# Patient Record
Sex: FEMALE | Race: WHITE | NOT HISPANIC OR LATINO | URBAN - METROPOLITAN AREA
[De-identification: names, ages, dates, MRNs, and addresses within clinical notes are randomized per-mention and may not be internally consistent; named-entity substitution may affect disease eponyms.]

---

## 2017-06-15 ENCOUNTER — OUTPATIENT (OUTPATIENT)
Dept: OUTPATIENT SERVICES | Facility: HOSPITAL | Age: 67
LOS: 1 days | Discharge: HOME | End: 2017-06-15

## 2017-06-28 DIAGNOSIS — R79.1 ABNORMAL COAGULATION PROFILE: ICD-10-CM

## 2017-06-29 ENCOUNTER — OUTPATIENT (OUTPATIENT)
Dept: OUTPATIENT SERVICES | Facility: HOSPITAL | Age: 67
LOS: 1 days | Discharge: HOME | End: 2017-06-29

## 2017-06-29 DIAGNOSIS — R79.1 ABNORMAL COAGULATION PROFILE: ICD-10-CM

## 2017-07-27 ENCOUNTER — OUTPATIENT (OUTPATIENT)
Dept: OUTPATIENT SERVICES | Facility: HOSPITAL | Age: 67
LOS: 1 days | Discharge: HOME | End: 2017-07-27

## 2017-07-27 DIAGNOSIS — R79.1 ABNORMAL COAGULATION PROFILE: ICD-10-CM

## 2017-07-27 DIAGNOSIS — N39.0 URINARY TRACT INFECTION, SITE NOT SPECIFIED: ICD-10-CM

## 2017-08-24 ENCOUNTER — OUTPATIENT (OUTPATIENT)
Dept: OUTPATIENT SERVICES | Facility: HOSPITAL | Age: 67
LOS: 1 days | Discharge: HOME | End: 2017-08-24

## 2017-08-24 DIAGNOSIS — R79.1 ABNORMAL COAGULATION PROFILE: ICD-10-CM

## 2017-09-07 ENCOUNTER — OUTPATIENT (OUTPATIENT)
Dept: OUTPATIENT SERVICES | Facility: HOSPITAL | Age: 67
LOS: 1 days | Discharge: HOME | End: 2017-09-07

## 2017-09-07 DIAGNOSIS — R79.1 ABNORMAL COAGULATION PROFILE: ICD-10-CM

## 2017-09-21 ENCOUNTER — OUTPATIENT (OUTPATIENT)
Dept: OUTPATIENT SERVICES | Facility: HOSPITAL | Age: 67
LOS: 1 days | Discharge: HOME | End: 2017-09-21

## 2017-09-21 DIAGNOSIS — R79.1 ABNORMAL COAGULATION PROFILE: ICD-10-CM

## 2017-10-12 ENCOUNTER — OUTPATIENT (OUTPATIENT)
Dept: OUTPATIENT SERVICES | Facility: HOSPITAL | Age: 67
LOS: 1 days | Discharge: HOME | End: 2017-10-12

## 2017-10-12 DIAGNOSIS — R79.1 ABNORMAL COAGULATION PROFILE: ICD-10-CM

## 2017-10-26 ENCOUNTER — OUTPATIENT (OUTPATIENT)
Dept: OUTPATIENT SERVICES | Facility: HOSPITAL | Age: 67
LOS: 1 days | Discharge: HOME | End: 2017-10-26

## 2017-10-26 DIAGNOSIS — R79.1 ABNORMAL COAGULATION PROFILE: ICD-10-CM

## 2017-11-09 ENCOUNTER — OUTPATIENT (OUTPATIENT)
Dept: OUTPATIENT SERVICES | Facility: HOSPITAL | Age: 67
LOS: 1 days | Discharge: HOME | End: 2017-11-09

## 2017-11-09 DIAGNOSIS — R79.1 ABNORMAL COAGULATION PROFILE: ICD-10-CM

## 2017-11-22 ENCOUNTER — OUTPATIENT (OUTPATIENT)
Dept: OUTPATIENT SERVICES | Facility: HOSPITAL | Age: 67
LOS: 1 days | Discharge: HOME | End: 2017-11-22

## 2017-11-22 DIAGNOSIS — R79.1 ABNORMAL COAGULATION PROFILE: ICD-10-CM

## 2017-12-07 ENCOUNTER — OUTPATIENT (OUTPATIENT)
Dept: OUTPATIENT SERVICES | Facility: HOSPITAL | Age: 67
LOS: 1 days | Discharge: HOME | End: 2017-12-07

## 2017-12-07 DIAGNOSIS — R79.1 ABNORMAL COAGULATION PROFILE: ICD-10-CM

## 2017-12-20 ENCOUNTER — OUTPATIENT (OUTPATIENT)
Dept: OUTPATIENT SERVICES | Facility: HOSPITAL | Age: 67
LOS: 1 days | Discharge: HOME | End: 2017-12-20

## 2017-12-20 DIAGNOSIS — R79.1 ABNORMAL COAGULATION PROFILE: ICD-10-CM

## 2018-01-06 ENCOUNTER — OUTPATIENT (OUTPATIENT)
Dept: OUTPATIENT SERVICES | Facility: HOSPITAL | Age: 68
LOS: 1 days | Discharge: HOME | End: 2018-01-06

## 2018-01-06 DIAGNOSIS — R80.9 PROTEINURIA, UNSPECIFIED: ICD-10-CM

## 2018-01-06 DIAGNOSIS — R79.89 OTHER SPECIFIED ABNORMAL FINDINGS OF BLOOD CHEMISTRY: ICD-10-CM

## 2018-01-06 DIAGNOSIS — R79.82 ELEVATED C-REACTIVE PROTEIN (CRP): ICD-10-CM

## 2018-01-06 DIAGNOSIS — R70.0 ELEVATED ERYTHROCYTE SEDIMENTATION RATE: ICD-10-CM

## 2018-01-06 DIAGNOSIS — D51.0 VITAMIN B12 DEFICIENCY ANEMIA DUE TO INTRINSIC FACTOR DEFICIENCY: ICD-10-CM

## 2018-01-06 DIAGNOSIS — R73.09 OTHER ABNORMAL GLUCOSE: ICD-10-CM

## 2018-01-06 DIAGNOSIS — R94.6 ABNORMAL RESULTS OF THYROID FUNCTION STUDIES: ICD-10-CM

## 2018-01-06 DIAGNOSIS — R79.1 ABNORMAL COAGULATION PROFILE: ICD-10-CM

## 2018-01-06 DIAGNOSIS — E55.9 VITAMIN D DEFICIENCY, UNSPECIFIED: ICD-10-CM

## 2018-01-06 DIAGNOSIS — E78.5 HYPERLIPIDEMIA, UNSPECIFIED: ICD-10-CM

## 2018-01-18 ENCOUNTER — OUTPATIENT (OUTPATIENT)
Dept: OUTPATIENT SERVICES | Facility: HOSPITAL | Age: 68
LOS: 1 days | Discharge: HOME | End: 2018-01-18

## 2018-01-18 DIAGNOSIS — R79.1 ABNORMAL COAGULATION PROFILE: ICD-10-CM

## 2018-01-18 DIAGNOSIS — N39.0 URINARY TRACT INFECTION, SITE NOT SPECIFIED: ICD-10-CM

## 2018-02-01 ENCOUNTER — OUTPATIENT (OUTPATIENT)
Dept: OUTPATIENT SERVICES | Facility: HOSPITAL | Age: 68
LOS: 1 days | Discharge: HOME | End: 2018-02-01

## 2018-02-01 DIAGNOSIS — R79.1 ABNORMAL COAGULATION PROFILE: ICD-10-CM

## 2018-03-02 ENCOUNTER — OUTPATIENT (OUTPATIENT)
Dept: OUTPATIENT SERVICES | Facility: HOSPITAL | Age: 68
LOS: 1 days | Discharge: HOME | End: 2018-03-02

## 2018-03-02 DIAGNOSIS — R79.1 ABNORMAL COAGULATION PROFILE: ICD-10-CM

## 2018-03-15 ENCOUNTER — OUTPATIENT (OUTPATIENT)
Dept: OUTPATIENT SERVICES | Facility: HOSPITAL | Age: 68
LOS: 1 days | Discharge: HOME | End: 2018-03-15

## 2018-03-15 DIAGNOSIS — R79.1 ABNORMAL COAGULATION PROFILE: ICD-10-CM

## 2018-03-29 ENCOUNTER — OUTPATIENT (OUTPATIENT)
Dept: OUTPATIENT SERVICES | Facility: HOSPITAL | Age: 68
LOS: 1 days | Discharge: HOME | End: 2018-03-29

## 2018-03-29 DIAGNOSIS — R79.1 ABNORMAL COAGULATION PROFILE: ICD-10-CM

## 2018-04-12 ENCOUNTER — OUTPATIENT (OUTPATIENT)
Dept: OUTPATIENT SERVICES | Facility: HOSPITAL | Age: 68
LOS: 1 days | Discharge: HOME | End: 2018-04-12

## 2018-04-12 DIAGNOSIS — C50.412 MALIGNANT NEOPLASM OF UPPER-OUTER QUADRANT OF LEFT FEMALE BREAST: ICD-10-CM

## 2018-04-12 DIAGNOSIS — R79.1 ABNORMAL COAGULATION PROFILE: ICD-10-CM

## 2018-05-04 ENCOUNTER — OUTPATIENT (OUTPATIENT)
Dept: OUTPATIENT SERVICES | Facility: HOSPITAL | Age: 68
LOS: 1 days | Discharge: HOME | End: 2018-05-04

## 2018-05-04 DIAGNOSIS — R79.1 ABNORMAL COAGULATION PROFILE: ICD-10-CM

## 2018-05-17 ENCOUNTER — OUTPATIENT (OUTPATIENT)
Dept: OUTPATIENT SERVICES | Facility: HOSPITAL | Age: 68
LOS: 1 days | Discharge: HOME | End: 2018-05-17

## 2018-05-17 DIAGNOSIS — R79.1 ABNORMAL COAGULATION PROFILE: ICD-10-CM

## 2018-05-31 ENCOUNTER — OUTPATIENT (OUTPATIENT)
Dept: OUTPATIENT SERVICES | Facility: HOSPITAL | Age: 68
LOS: 1 days | Discharge: HOME | End: 2018-05-31

## 2018-05-31 DIAGNOSIS — R79.1 ABNORMAL COAGULATION PROFILE: ICD-10-CM

## 2018-06-14 ENCOUNTER — OUTPATIENT (OUTPATIENT)
Dept: OUTPATIENT SERVICES | Facility: HOSPITAL | Age: 68
LOS: 1 days | Discharge: HOME | End: 2018-06-14

## 2018-06-14 DIAGNOSIS — R79.1 ABNORMAL COAGULATION PROFILE: ICD-10-CM

## 2018-06-28 ENCOUNTER — OUTPATIENT (OUTPATIENT)
Dept: OUTPATIENT SERVICES | Facility: HOSPITAL | Age: 68
LOS: 1 days | Discharge: HOME | End: 2018-06-28

## 2018-06-28 DIAGNOSIS — R79.1 ABNORMAL COAGULATION PROFILE: ICD-10-CM

## 2018-07-12 ENCOUNTER — OUTPATIENT (OUTPATIENT)
Dept: OUTPATIENT SERVICES | Facility: HOSPITAL | Age: 68
LOS: 1 days | Discharge: HOME | End: 2018-07-12

## 2018-07-12 DIAGNOSIS — R79.1 ABNORMAL COAGULATION PROFILE: ICD-10-CM

## 2018-08-09 ENCOUNTER — OUTPATIENT (OUTPATIENT)
Dept: OUTPATIENT SERVICES | Facility: HOSPITAL | Age: 68
LOS: 1 days | Discharge: HOME | End: 2018-08-09

## 2018-08-09 DIAGNOSIS — R79.1 ABNORMAL COAGULATION PROFILE: ICD-10-CM

## 2018-08-23 ENCOUNTER — OUTPATIENT (OUTPATIENT)
Dept: OUTPATIENT SERVICES | Facility: HOSPITAL | Age: 68
LOS: 1 days | Discharge: HOME | End: 2018-08-23

## 2018-08-23 DIAGNOSIS — R79.1 ABNORMAL COAGULATION PROFILE: ICD-10-CM

## 2018-09-07 ENCOUNTER — OUTPATIENT (OUTPATIENT)
Dept: OUTPATIENT SERVICES | Facility: HOSPITAL | Age: 68
LOS: 1 days | Discharge: HOME | End: 2018-09-07

## 2018-09-07 DIAGNOSIS — R79.1 ABNORMAL COAGULATION PROFILE: ICD-10-CM

## 2018-09-21 ENCOUNTER — OUTPATIENT (OUTPATIENT)
Dept: OUTPATIENT SERVICES | Facility: HOSPITAL | Age: 68
LOS: 1 days | Discharge: HOME | End: 2018-09-21

## 2018-09-21 DIAGNOSIS — R79.0 ABNORMAL LEVEL OF BLOOD MINERAL: ICD-10-CM

## 2018-09-21 DIAGNOSIS — R79.1 ABNORMAL COAGULATION PROFILE: ICD-10-CM

## 2018-10-04 ENCOUNTER — OUTPATIENT (OUTPATIENT)
Dept: OUTPATIENT SERVICES | Facility: HOSPITAL | Age: 68
LOS: 1 days | Discharge: HOME | End: 2018-10-04

## 2018-10-04 DIAGNOSIS — E61.2 MAGNESIUM DEFICIENCY: ICD-10-CM

## 2018-10-04 DIAGNOSIS — R94.6 ABNORMAL RESULTS OF THYROID FUNCTION STUDIES: ICD-10-CM

## 2018-10-04 DIAGNOSIS — R79.89 OTHER SPECIFIED ABNORMAL FINDINGS OF BLOOD CHEMISTRY: ICD-10-CM

## 2018-10-04 DIAGNOSIS — R79.82 ELEVATED C-REACTIVE PROTEIN (CRP): ICD-10-CM

## 2018-10-04 DIAGNOSIS — R73.09 OTHER ABNORMAL GLUCOSE: ICD-10-CM

## 2018-10-04 DIAGNOSIS — M10.9 GOUT, UNSPECIFIED: ICD-10-CM

## 2018-10-04 DIAGNOSIS — E78.5 HYPERLIPIDEMIA, UNSPECIFIED: ICD-10-CM

## 2018-10-04 DIAGNOSIS — D51.0 VITAMIN B12 DEFICIENCY ANEMIA DUE TO INTRINSIC FACTOR DEFICIENCY: ICD-10-CM

## 2018-10-04 DIAGNOSIS — D64.9 ANEMIA, UNSPECIFIED: ICD-10-CM

## 2018-10-04 DIAGNOSIS — R82.90 UNSPECIFIED ABNORMAL FINDINGS IN URINE: ICD-10-CM

## 2018-10-04 DIAGNOSIS — R79.1 ABNORMAL COAGULATION PROFILE: ICD-10-CM

## 2018-10-04 DIAGNOSIS — R80.9 PROTEINURIA, UNSPECIFIED: ICD-10-CM

## 2018-10-18 ENCOUNTER — OUTPATIENT (OUTPATIENT)
Dept: OUTPATIENT SERVICES | Facility: HOSPITAL | Age: 68
LOS: 1 days | Discharge: HOME | End: 2018-10-18

## 2018-10-18 DIAGNOSIS — R79.0 ABNORMAL LEVEL OF BLOOD MINERAL: ICD-10-CM

## 2018-10-18 DIAGNOSIS — D68.8 OTHER SPECIFIED COAGULATION DEFECTS: ICD-10-CM

## 2018-11-01 ENCOUNTER — OUTPATIENT (OUTPATIENT)
Dept: OUTPATIENT SERVICES | Facility: HOSPITAL | Age: 68
LOS: 1 days | Discharge: HOME | End: 2018-11-01

## 2018-11-01 DIAGNOSIS — R79.0 ABNORMAL LEVEL OF BLOOD MINERAL: ICD-10-CM

## 2018-11-15 ENCOUNTER — OUTPATIENT (OUTPATIENT)
Dept: OUTPATIENT SERVICES | Facility: HOSPITAL | Age: 68
LOS: 1 days | Discharge: HOME | End: 2018-11-15

## 2018-11-15 DIAGNOSIS — R79.1 ABNORMAL COAGULATION PROFILE: ICD-10-CM

## 2018-12-07 ENCOUNTER — OUTPATIENT (OUTPATIENT)
Dept: OUTPATIENT SERVICES | Facility: HOSPITAL | Age: 68
LOS: 1 days | Discharge: HOME | End: 2018-12-07

## 2018-12-07 DIAGNOSIS — R79.1 ABNORMAL COAGULATION PROFILE: ICD-10-CM

## 2018-12-20 ENCOUNTER — OUTPATIENT (OUTPATIENT)
Dept: OUTPATIENT SERVICES | Facility: HOSPITAL | Age: 68
LOS: 1 days | Discharge: HOME | End: 2018-12-20

## 2018-12-20 DIAGNOSIS — R79.1 ABNORMAL COAGULATION PROFILE: ICD-10-CM

## 2019-01-02 ENCOUNTER — OUTPATIENT (OUTPATIENT)
Dept: OUTPATIENT SERVICES | Facility: HOSPITAL | Age: 69
LOS: 1 days | Discharge: HOME | End: 2019-01-02

## 2019-01-02 DIAGNOSIS — R79.1 ABNORMAL COAGULATION PROFILE: ICD-10-CM

## 2019-01-16 ENCOUNTER — OUTPATIENT (OUTPATIENT)
Dept: OUTPATIENT SERVICES | Facility: HOSPITAL | Age: 69
LOS: 1 days | Discharge: HOME | End: 2019-01-16

## 2019-01-16 DIAGNOSIS — R79.1 ABNORMAL COAGULATION PROFILE: ICD-10-CM

## 2019-01-30 ENCOUNTER — OUTPATIENT (OUTPATIENT)
Dept: OUTPATIENT SERVICES | Facility: HOSPITAL | Age: 69
LOS: 1 days | Discharge: HOME | End: 2019-01-30

## 2019-01-30 DIAGNOSIS — R79.1 ABNORMAL COAGULATION PROFILE: ICD-10-CM

## 2019-02-13 ENCOUNTER — OUTPATIENT (OUTPATIENT)
Dept: OUTPATIENT SERVICES | Facility: HOSPITAL | Age: 69
LOS: 1 days | Discharge: HOME | End: 2019-02-13

## 2019-02-13 DIAGNOSIS — R79.1 ABNORMAL COAGULATION PROFILE: ICD-10-CM

## 2019-02-27 ENCOUNTER — OUTPATIENT (OUTPATIENT)
Dept: OUTPATIENT SERVICES | Facility: HOSPITAL | Age: 69
LOS: 1 days | Discharge: HOME | End: 2019-02-27

## 2019-02-27 DIAGNOSIS — R79.1 ABNORMAL COAGULATION PROFILE: ICD-10-CM

## 2019-03-13 ENCOUNTER — OUTPATIENT (OUTPATIENT)
Dept: OUTPATIENT SERVICES | Facility: HOSPITAL | Age: 69
LOS: 1 days | Discharge: HOME | End: 2019-03-13

## 2019-03-13 DIAGNOSIS — R79.1 ABNORMAL COAGULATION PROFILE: ICD-10-CM

## 2019-03-27 ENCOUNTER — OUTPATIENT (OUTPATIENT)
Dept: OUTPATIENT SERVICES | Facility: HOSPITAL | Age: 69
LOS: 1 days | Discharge: HOME | End: 2019-03-27

## 2019-03-27 DIAGNOSIS — R79.1 ABNORMAL COAGULATION PROFILE: ICD-10-CM

## 2019-04-10 ENCOUNTER — OUTPATIENT (OUTPATIENT)
Dept: OUTPATIENT SERVICES | Facility: HOSPITAL | Age: 69
LOS: 1 days | Discharge: HOME | End: 2019-04-10

## 2019-04-10 DIAGNOSIS — R79.1 ABNORMAL COAGULATION PROFILE: ICD-10-CM

## 2019-04-10 DIAGNOSIS — C50.412 MALIGNANT NEOPLASM OF UPPER-OUTER QUADRANT OF LEFT FEMALE BREAST: ICD-10-CM

## 2019-04-24 ENCOUNTER — OUTPATIENT (OUTPATIENT)
Dept: OUTPATIENT SERVICES | Facility: HOSPITAL | Age: 69
LOS: 1 days | Discharge: HOME | End: 2019-04-24

## 2019-04-24 DIAGNOSIS — R79.1 ABNORMAL COAGULATION PROFILE: ICD-10-CM

## 2019-05-06 ENCOUNTER — OUTPATIENT (OUTPATIENT)
Dept: OUTPATIENT SERVICES | Facility: HOSPITAL | Age: 69
LOS: 1 days | Discharge: HOME | End: 2019-05-06

## 2019-05-06 DIAGNOSIS — R79.1 ABNORMAL COAGULATION PROFILE: ICD-10-CM

## 2019-05-22 ENCOUNTER — OUTPATIENT (OUTPATIENT)
Dept: OUTPATIENT SERVICES | Facility: HOSPITAL | Age: 69
LOS: 1 days | Discharge: HOME | End: 2019-05-22

## 2019-05-22 DIAGNOSIS — R79.1 ABNORMAL COAGULATION PROFILE: ICD-10-CM

## 2019-06-05 ENCOUNTER — OUTPATIENT (OUTPATIENT)
Dept: OUTPATIENT SERVICES | Facility: HOSPITAL | Age: 69
LOS: 1 days | Discharge: HOME | End: 2019-06-05

## 2019-06-05 DIAGNOSIS — R79.1 ABNORMAL COAGULATION PROFILE: ICD-10-CM

## 2019-06-18 ENCOUNTER — OUTPATIENT (OUTPATIENT)
Dept: OUTPATIENT SERVICES | Facility: HOSPITAL | Age: 69
LOS: 1 days | Discharge: HOME | End: 2019-06-18

## 2019-06-18 DIAGNOSIS — R79.1 ABNORMAL COAGULATION PROFILE: ICD-10-CM

## 2019-07-08 ENCOUNTER — OUTPATIENT (OUTPATIENT)
Dept: OUTPATIENT SERVICES | Facility: HOSPITAL | Age: 69
LOS: 1 days | Discharge: HOME | End: 2019-07-08

## 2019-07-08 DIAGNOSIS — R79.1 ABNORMAL COAGULATION PROFILE: ICD-10-CM

## 2019-07-22 ENCOUNTER — OUTPATIENT (OUTPATIENT)
Dept: OUTPATIENT SERVICES | Facility: HOSPITAL | Age: 69
LOS: 1 days | Discharge: HOME | End: 2019-07-22

## 2019-07-22 DIAGNOSIS — R79.1 ABNORMAL COAGULATION PROFILE: ICD-10-CM

## 2019-08-05 ENCOUNTER — OUTPATIENT (OUTPATIENT)
Dept: OUTPATIENT SERVICES | Facility: HOSPITAL | Age: 69
LOS: 1 days | Discharge: HOME | End: 2019-08-05

## 2019-08-05 DIAGNOSIS — R79.1 ABNORMAL COAGULATION PROFILE: ICD-10-CM

## 2019-08-20 ENCOUNTER — OUTPATIENT (OUTPATIENT)
Dept: OUTPATIENT SERVICES | Facility: HOSPITAL | Age: 69
LOS: 1 days | Discharge: HOME | End: 2019-08-20

## 2019-08-20 DIAGNOSIS — R79.1 ABNORMAL COAGULATION PROFILE: ICD-10-CM

## 2019-09-03 ENCOUNTER — OUTPATIENT (OUTPATIENT)
Dept: OUTPATIENT SERVICES | Facility: HOSPITAL | Age: 69
LOS: 1 days | Discharge: HOME | End: 2019-09-03

## 2019-09-03 DIAGNOSIS — R79.1 ABNORMAL COAGULATION PROFILE: ICD-10-CM

## 2019-09-13 ENCOUNTER — OUTPATIENT (OUTPATIENT)
Dept: OUTPATIENT SERVICES | Facility: HOSPITAL | Age: 69
LOS: 1 days | Discharge: HOME | End: 2019-09-13

## 2019-09-13 DIAGNOSIS — R79.1 ABNORMAL COAGULATION PROFILE: ICD-10-CM

## 2019-09-27 ENCOUNTER — OUTPATIENT (OUTPATIENT)
Dept: OUTPATIENT SERVICES | Facility: HOSPITAL | Age: 69
LOS: 1 days | Discharge: HOME | End: 2019-09-27

## 2019-09-27 DIAGNOSIS — R79.1 ABNORMAL COAGULATION PROFILE: ICD-10-CM

## 2019-10-15 ENCOUNTER — OUTPATIENT (OUTPATIENT)
Dept: OUTPATIENT SERVICES | Facility: HOSPITAL | Age: 69
LOS: 1 days | Discharge: HOME | End: 2019-10-15

## 2019-10-15 DIAGNOSIS — D68.59 OTHER PRIMARY THROMBOPHILIA: ICD-10-CM

## 2019-10-15 DIAGNOSIS — R79.1 ABNORMAL COAGULATION PROFILE: ICD-10-CM

## 2019-10-28 ENCOUNTER — OUTPATIENT (OUTPATIENT)
Dept: OUTPATIENT SERVICES | Facility: HOSPITAL | Age: 69
LOS: 1 days | Discharge: HOME | End: 2019-10-28

## 2019-10-28 DIAGNOSIS — R79.1 ABNORMAL COAGULATION PROFILE: ICD-10-CM

## 2019-11-18 ENCOUNTER — OUTPATIENT (OUTPATIENT)
Dept: OUTPATIENT SERVICES | Facility: HOSPITAL | Age: 69
LOS: 1 days | Discharge: HOME | End: 2019-11-18

## 2019-11-18 DIAGNOSIS — R79.82 ELEVATED C-REACTIVE PROTEIN (CRP): ICD-10-CM

## 2019-11-18 DIAGNOSIS — R80.9 PROTEINURIA, UNSPECIFIED: ICD-10-CM

## 2019-11-18 DIAGNOSIS — E55.9 VITAMIN D DEFICIENCY, UNSPECIFIED: ICD-10-CM

## 2019-11-18 DIAGNOSIS — R73.09 OTHER ABNORMAL GLUCOSE: ICD-10-CM

## 2019-11-18 DIAGNOSIS — R79.89 OTHER SPECIFIED ABNORMAL FINDINGS OF BLOOD CHEMISTRY: ICD-10-CM

## 2019-11-18 DIAGNOSIS — D51.0 VITAMIN B12 DEFICIENCY ANEMIA DUE TO INTRINSIC FACTOR DEFICIENCY: ICD-10-CM

## 2019-11-18 DIAGNOSIS — M10.9 GOUT, UNSPECIFIED: ICD-10-CM

## 2019-11-18 DIAGNOSIS — E61.2 MAGNESIUM DEFICIENCY: ICD-10-CM

## 2019-11-18 DIAGNOSIS — E78.5 HYPERLIPIDEMIA, UNSPECIFIED: ICD-10-CM

## 2019-11-18 DIAGNOSIS — D64.9 ANEMIA, UNSPECIFIED: ICD-10-CM

## 2019-11-18 DIAGNOSIS — R94.6 ABNORMAL RESULTS OF THYROID FUNCTION STUDIES: ICD-10-CM

## 2021-08-23 PROBLEM — Z00.00 ENCOUNTER FOR PREVENTIVE HEALTH EXAMINATION: Status: ACTIVE | Noted: 2021-08-23

## 2021-09-11 ENCOUNTER — TRANSCRIPTION ENCOUNTER (OUTPATIENT)
Age: 71
End: 2021-09-11

## 2021-09-13 ENCOUNTER — TRANSCRIPTION ENCOUNTER (OUTPATIENT)
Age: 71
End: 2021-09-13

## 2021-09-26 ENCOUNTER — NON-APPOINTMENT (OUTPATIENT)
Age: 71
End: 2021-09-26

## 2021-09-30 ENCOUNTER — APPOINTMENT (OUTPATIENT)
Age: 71
End: 2021-09-30
Payer: MEDICARE

## 2021-09-30 VITALS
BODY MASS INDEX: 18.05 KG/M2 | HEIGHT: 67 IN | OXYGEN SATURATION: 95 % | WEIGHT: 115 LBS | SYSTOLIC BLOOD PRESSURE: 136 MMHG | DIASTOLIC BLOOD PRESSURE: 84 MMHG | RESPIRATION RATE: 14 BRPM | HEART RATE: 79 BPM

## 2021-09-30 DIAGNOSIS — Z87.898 PERSONAL HISTORY OF OTHER SPECIFIED CONDITIONS: ICD-10-CM

## 2021-09-30 DIAGNOSIS — I10 ESSENTIAL (PRIMARY) HYPERTENSION: ICD-10-CM

## 2021-09-30 DIAGNOSIS — Z86.711 PERSONAL HISTORY OF PULMONARY EMBOLISM: ICD-10-CM

## 2021-09-30 PROCEDURE — 99204 OFFICE O/P NEW MOD 45 MIN: CPT | Mod: 25

## 2021-09-30 PROCEDURE — G0296 VISIT TO DETERM LDCT ELIG: CPT

## 2021-09-30 NOTE — HISTORY OF PRESENT ILLNESS
[TextBox_4] : I reviewed original  notes from prior doc\par \par I reviewed and interpreted any  OP CXR/CT available \par I discussed the results with the patient.\par .\par

## 2021-09-30 NOTE — ASSESSMENT
[FreeTextEntry1] : The patient has stable COPD and bronchiectasis.\par \par She has diffuse granulomatous disease which is probably due to a varicella pneumonia.  She has a pulmonary embolism with an IVC filter as she on chronic anticoagulation due to to a clotting disorder.\par \par A:\par COPD  \par \par plan:\par Stress compliance with inhalers. Renewed today.\par cont PRN albuterol\par cont ICS/LABA\par needs PFT\par weight loss\par screening CT chest refused the patient states she does not want any more CAT scans she stopped smoking about 13 to 14 years ago\par \par F/U 6 months\par

## 2021-09-30 NOTE — REASON FOR VISIT
[Initial] : an initial visit [COPD] : COPD [Pulmonary Embolism] : pulmonary embolism [Bronchiectasis] : bronchiectasis [TextBox_44] : The patient has a long history of COPD and bronchiectasis.  She was followed by a physician in [place] who subsequently retired.  She then moved to [place] and requests follow-up.  After reviewing the files the patient has moderate COPD with an FEV1 of around 50%.  She has areas of bronchiectasis.  There is also diffuse granulomatous disease in the form of micronodules.  She does relate having varicella at the age of 29 when she was very ill.\par \par She is able to perform her ADLs and only feels short of breath when she walks quickly.  She can walk up 1 flight of stairs without stopping but she can feel it at the top.  2 flights of stairs she would need to stop at the top and rest.  She does not feel it is affecting her ADLs to any significant extent.

## 2022-04-07 ENCOUNTER — APPOINTMENT (OUTPATIENT)
Age: 72
End: 2022-04-07
Payer: MEDICARE

## 2022-04-07 VITALS
WEIGHT: 111 LBS | DIASTOLIC BLOOD PRESSURE: 80 MMHG | OXYGEN SATURATION: 86 % | BODY MASS INDEX: 17.42 KG/M2 | HEART RATE: 102 BPM | RESPIRATION RATE: 14 BRPM | HEIGHT: 67 IN | SYSTOLIC BLOOD PRESSURE: 132 MMHG

## 2022-04-07 PROCEDURE — 99214 OFFICE O/P EST MOD 30 MIN: CPT

## 2022-06-15 NOTE — REASON FOR VISIT
[Follow-Up] : a follow-up visit [COPD] : COPD [TextBox_44] : COPD stable. Pt has been slowl declining over the past few years. She has dyspnea on minimal exertion.

## 2022-06-15 NOTE — PHYSICAL EXAM
[No Acute Distress] : no acute distress [Normal Oropharynx] : normal oropharynx [Normal Appearance] : normal appearance [No Neck Mass] : no neck mass [Normal Rate/Rhythm] : normal rate/rhythm [Normal S1, S2] : normal s1, s2 [No Murmurs] : no murmurs [No Resp Distress] : no resp distress [Clear to Auscultation Bilaterally] : clear to auscultation bilaterally [No Abnormalities] : no abnormalities [Benign] : benign [Normal Gait] : normal gait [No Clubbing] : no clubbing [No Cyanosis] : no cyanosis [No Edema] : no edema [FROM] : FROM [Normal Color/ Pigmentation] : normal color/ pigmentation [No Focal Deficits] : no focal deficits [Oriented x3] : oriented x3 [Normal Affect] : normal affect [TextBox_68] : Decreased breath sounds

## 2022-06-15 NOTE — ASSESSMENT
[FreeTextEntry1] : The patient has severe COPD and bronchiectasis.\par \par She has diffuse granulomatous disease .  \par She had a pulmonary embolism with an IVC filter as she on chronic anticoagulation due to to a clotting disorder.\par \par A:\par COPD  \par \par plan:\par Stress compliance with inhalers. Renewed today.\par cont PRN albuterol\par cont ICS/LABA\par needs PFT\par  CT chest refused the patient states she does not want any more CAT scans she stopped smoking about 13 to 14 years ago\par \par F/U 6 months\par

## 2022-10-17 ENCOUNTER — APPOINTMENT (OUTPATIENT)
Age: 72
End: 2022-10-17

## 2022-10-17 VITALS
OXYGEN SATURATION: 94 % | HEART RATE: 111 BPM | SYSTOLIC BLOOD PRESSURE: 120 MMHG | HEIGHT: 67 IN | RESPIRATION RATE: 12 BRPM | DIASTOLIC BLOOD PRESSURE: 60 MMHG | BODY MASS INDEX: 16.64 KG/M2 | WEIGHT: 106 LBS

## 2022-10-17 PROCEDURE — 90662 IIV NO PRSV INCREASED AG IM: CPT

## 2022-10-17 PROCEDURE — 99214 OFFICE O/P EST MOD 30 MIN: CPT | Mod: 25

## 2022-10-17 PROCEDURE — G0008: CPT

## 2022-10-17 NOTE — REASON FOR VISIT
[Follow-Up] : a follow-up visit [COPD] : COPD [Bronchiectasis] : bronchiectasis [TextBox_44] : Doing fairly well.  She does get short of breath on moderate exertion such as vacuuming the living room.  She has to stop for a while to manage her breath.  If she is doing a slow stroll she can continue this for some time without any difficulties.  Overall

## 2022-10-17 NOTE — ASSESSMENT
[FreeTextEntry1] : Assessment:\par COPD \par On bronchiectasis\par \par \par plan:\par Stress compliance with inhalers. Renewed today.\par cont PRN albuterol\par cont ICS/LABA\par needs PFT\par weight loss\par screening CT chest was discussed with the patient but she does not want one at this time.  She states that she will consider it in the spring.\par Flu shot was given high-dose right shoulder without incident\par F/U 6 months\par

## 2022-11-17 ENCOUNTER — EMERGENCY (EMERGENCY)
Facility: HOSPITAL | Age: 72
LOS: 0 days | Discharge: HOME | End: 2022-11-17
Attending: EMERGENCY MEDICINE | Admitting: EMERGENCY MEDICINE

## 2022-11-17 VITALS
SYSTOLIC BLOOD PRESSURE: 123 MMHG | TEMPERATURE: 96 F | OXYGEN SATURATION: 98 % | HEART RATE: 104 BPM | WEIGHT: 104.06 LBS | RESPIRATION RATE: 18 BRPM | HEIGHT: 67 IN | DIASTOLIC BLOOD PRESSURE: 72 MMHG

## 2022-11-17 DIAGNOSIS — S01.81XA LACERATION WITHOUT FOREIGN BODY OF OTHER PART OF HEAD, INITIAL ENCOUNTER: ICD-10-CM

## 2022-11-17 DIAGNOSIS — S80.211A ABRASION, RIGHT KNEE, INITIAL ENCOUNTER: ICD-10-CM
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
## 2022-11-17 DIAGNOSIS — Z23 ENCOUNTER FOR IMMUNIZATION: ICD-10-CM
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
## 2022-11-17 PROCEDURE — 99284 EMERGENCY DEPT VISIT MOD MDM: CPT | Mod: FS,25

## 2022-11-17 PROCEDURE — 70450 CT HEAD/BRAIN W/O DYE: CPT | Mod: 26,MA

## 2022-11-17 PROCEDURE — 12011 RPR F/E/E/N/L/M 2.5 CM/<: CPT

## 2022-11-17 PROCEDURE — 72125 CT NECK SPINE W/O DYE: CPT | Mod: 26,MA

## 2022-11-17 RX ORDER — TETANUS TOXOID, REDUCED DIPHTHERIA TOXOID AND ACELLULAR PERTUSSIS VACCINE, ADSORBED 5; 2.5; 8; 8; 2.5 [IU]/.5ML; [IU]/.5ML; UG/.5ML; UG/.5ML; UG/.5ML
0.5 SUSPENSION INTRAMUSCULAR ONCE
Refills: 0 | Status: COMPLETED | OUTPATIENT
Start: 2022-11-17 | End: 2022-11-17

## 2022-11-17 RX ADMIN — TETANUS TOXOID, REDUCED DIPHTHERIA TOXOID AND ACELLULAR PERTUSSIS VACCINE, ADSORBED 0.5 MILLILITER(S): 5; 2.5; 8; 8; 2.5 SUSPENSION INTRAMUSCULAR at 15:14

## 2022-11-17 NOTE — ED PROVIDER NOTE - NS ED ROS FT
Review of Systems    Constitutional: (-) fever/ chills   Cardiovascular: (-) chest pain, (-) syncope (-) palpitations  Respiratory: (-) cough, (-) shortness of breath  neck: (-) neck pain or stiffness  Musculoskeletal:  (-) back pain, (-) joint pain   Integumentary: +laceration forehead  Neurological: (-) headache, (-) altered mental status

## 2022-11-17 NOTE — ED PROVIDER NOTE - PATIENT PORTAL LINK FT
You can access the FollowMyHealth Patient Portal offered by [facility] by registering at the following website: [email or web address]. By joining [facility]’s FollowMyHealth portal, you will also be able to view your health information using other applications (apps) compatible with our system.

## 2022-11-17 NOTE — ED PROVIDER NOTE - ATTENDING APP SHARED VISIT CONTRIBUTION OF CARE
72yF on xarelto p/w fall from standing - pt w/ frequent falls/skin tears and said she fell getting off the couch and hit her head on the opposite couch.  No LOC, dizziness, CP, SOB.  +bleeding lac to anterior forehead.  No neck/back pain, torso or extremity pain.

## 2022-11-17 NOTE — ED PROVIDER NOTE - NS ED ATTENDING STATEMENT MOD
This was a shared visit with the LESLYE. I reviewed and verified the documentation and independently performed the documented:

## 2022-11-17 NOTE — ED ADULT TRIAGE NOTE - CHIEF COMPLAINT QUOTE
Pt states " I fell over something and hit my head on my sofa. I think I need stitches"  Denies LOC   Pt on Xarelto

## 2022-11-17 NOTE — ED PROVIDER NOTE - NSFOLLOWUPINSTRUCTIONS_ED_ALL_ED_FT
Closed Head Injury    A closed head injury is an injury to your head that may or may not involve a traumatic brain injury (TBI).  A CT scan of the head may not have been performed because they are usually normal after a concussion. Concussions are diagnosed and managed based on the history given and the symptoms experienced after the head injury. Most concussions do not cause serious problem and get better over several days.  Symptoms of TBI can be short or long lasting and include headache, dizziness, interference with memory or speech, fatigue, confusion, changes in sleep, mood changes, nausea, depression/anxiety, and dulling of senses. Make sure to obtain proper rest which includes getting plenty of sleep, avoiding excessive visual stimulation, and avoiding activities that may cause physical or mental stress. Avoid any situation where there is potential for another head injury, including sports.    SEEK IMMEDIATE MEDICAL CARE IF YOU HAVE ANY OF THE FOLLOWING SYMPTOMS: unusual drowsiness, vomiting, severe dizziness, seizures, lightheadedness, muscular weakness, different pupil sizes, visual changes, or clear or bloody discharge from your ears or nose.      Laceration    A laceration is a cut that goes through all of the layers of the skin and into the tissue that is right under the skin. Some lacerations heal on their own. Others need to be closed with stitches (sutures), staples, skin adhesive strips, or skin glue. Proper laceration care minimizes the risk of infection and helps the laceration to heal better.     SEEK IMMEDIATE MEDICAL CARE IF YOU HAVE THE FOLLOWING SYMPTOMS: swelling around the wound, worsening pain, drainage from the wound, red streaking going away from your wound, inability to move finger or toe near the laceration, or discoloration of skin near the laceration.      suture removal in 7 days

## 2022-11-17 NOTE — ED PROVIDER NOTE - PHYSICAL EXAMINATION
Vital Signs: I have reviewed the initial vital signs.  Constitutional: elderly and frail  Eyes: PERRLA, EOMI,  clear conjunctiva  Cardiovascular: regular rate, regular rhythm, no murmur appreciated  Respiratory: unlabored respiratory effort, clear to auscultation bilaterally  Musculoskeletal: supple neck, no cervical tenderness, pelvis stable, no bony tenderness  Integumentary: laceration vertical 2 cm forehead, no foreign body , no bony step off, +abrasion to right knee, no bleeding   Neurologic: awake, alert, cranial nerves II-XII grossly intact, extremities’ motor and sensory functions grossly intact, no focal deficits, GCS 15

## 2022-11-17 NOTE — ED PROVIDER NOTE - OBJECTIVE STATEMENT
71 y/o female on xarelto presents to the ED s/p mechanical trip and fall today sustaining laceration to forehead, abrasion to right knee. patient denies any headache, dizziness, visual changes. patient denies weakness to extremities. patient ambulatory. c/o soreness to forehead. no dental injury.

## 2022-11-17 NOTE — ED PROVIDER NOTE - CLINICAL SUMMARY MEDICAL DECISION MAKING FREE TEXT BOX
72yF on a/c p/w mechanical fall and forehead laceration + skin tear to R knee.  Tdap updated.  Pt w/o focal neuro deficits.  Lac repaired.  CTH/c-spine w/o acute traumatic injury.  Recommend supportive care, o/p f/u as indicated, return precautions.

## 2022-12-27 ENCOUNTER — RX RENEWAL (OUTPATIENT)
Age: 72
End: 2022-12-27

## 2023-01-23 ENCOUNTER — RX RENEWAL (OUTPATIENT)
Age: 73
End: 2023-01-23

## 2023-04-10 ENCOUNTER — INPATIENT (INPATIENT)
Facility: HOSPITAL | Age: 73
LOS: 3 days | Discharge: NURSING, REHAB OR LONG-TERM CARE | DRG: 536 | End: 2023-04-14
Attending: FAMILY MEDICINE | Admitting: FAMILY MEDICINE
Payer: MEDICARE

## 2023-04-10 ENCOUNTER — FORM ENCOUNTER (OUTPATIENT)
Age: 73
End: 2023-04-10

## 2023-04-10 VITALS
SYSTOLIC BLOOD PRESSURE: 202 MMHG | TEMPERATURE: 96 F | HEART RATE: 93 BPM | DIASTOLIC BLOOD PRESSURE: 96 MMHG | OXYGEN SATURATION: 99 % | RESPIRATION RATE: 18 BRPM

## 2023-04-10 DIAGNOSIS — Z95.828 PRESENCE OF OTHER VASCULAR IMPLANTS AND GRAFTS: Chronic | ICD-10-CM

## 2023-04-10 DIAGNOSIS — S32.599A OTHER SPECIFIED FRACTURE OF UNSPECIFIED PUBIS, INITIAL ENCOUNTER FOR CLOSED FRACTURE: ICD-10-CM

## 2023-04-10 PROBLEM — I48.91 UNSPECIFIED ATRIAL FIBRILLATION: Chronic | Status: ACTIVE | Noted: 2022-11-17

## 2023-04-10 LAB
ALBUMIN SERPL ELPH-MCNC: 3.5 G/DL — SIGNIFICANT CHANGE UP (ref 3.5–5.2)
ALP SERPL-CCNC: 109 U/L — SIGNIFICANT CHANGE UP (ref 30–115)
ALT FLD-CCNC: 16 U/L — SIGNIFICANT CHANGE UP (ref 0–41)
ANION GAP SERPL CALC-SCNC: 14 MMOL/L — SIGNIFICANT CHANGE UP (ref 7–14)
APTT BLD: 31.7 SEC — SIGNIFICANT CHANGE UP (ref 27–39.2)
AST SERPL-CCNC: 41 U/L — SIGNIFICANT CHANGE UP (ref 0–41)
BASOPHILS # BLD AUTO: 0.02 K/UL — SIGNIFICANT CHANGE UP (ref 0–0.2)
BASOPHILS NFR BLD AUTO: 0.4 % — SIGNIFICANT CHANGE UP (ref 0–1)
BILIRUB SERPL-MCNC: 1 MG/DL — SIGNIFICANT CHANGE UP (ref 0.2–1.2)
BUN SERPL-MCNC: 21 MG/DL — HIGH (ref 10–20)
CALCIUM SERPL-MCNC: 9.3 MG/DL — SIGNIFICANT CHANGE UP (ref 8.4–10.5)
CHLORIDE SERPL-SCNC: 102 MMOL/L — SIGNIFICANT CHANGE UP (ref 98–110)
CO2 SERPL-SCNC: 25 MMOL/L — SIGNIFICANT CHANGE UP (ref 17–32)
CREAT SERPL-MCNC: 0.8 MG/DL — SIGNIFICANT CHANGE UP (ref 0.7–1.5)
EGFR: 78 ML/MIN/1.73M2 — SIGNIFICANT CHANGE UP
EOSINOPHIL # BLD AUTO: 0.04 K/UL — SIGNIFICANT CHANGE UP (ref 0–0.7)
EOSINOPHIL NFR BLD AUTO: 0.7 % — SIGNIFICANT CHANGE UP (ref 0–8)
GLUCOSE SERPL-MCNC: 96 MG/DL — SIGNIFICANT CHANGE UP (ref 70–99)
HCT VFR BLD CALC: 32 % — LOW (ref 37–47)
HGB BLD-MCNC: 10.8 G/DL — LOW (ref 12–16)
IMM GRANULOCYTES NFR BLD AUTO: 0.4 % — HIGH (ref 0.1–0.3)
INR BLD: 1.08 RATIO — SIGNIFICANT CHANGE UP (ref 0.65–1.3)
LYMPHOCYTES # BLD AUTO: 0.77 K/UL — LOW (ref 1.2–3.4)
LYMPHOCYTES # BLD AUTO: 13.9 % — LOW (ref 20.5–51.1)
MCHC RBC-ENTMCNC: 33.8 G/DL — SIGNIFICANT CHANGE UP (ref 32–37)
MCHC RBC-ENTMCNC: 35.9 PG — HIGH (ref 27–31)
MCV RBC AUTO: 106.3 FL — HIGH (ref 81–99)
MONOCYTES # BLD AUTO: 0.8 K/UL — HIGH (ref 0.1–0.6)
MONOCYTES NFR BLD AUTO: 14.4 % — HIGH (ref 1.7–9.3)
NEUTROPHILS # BLD AUTO: 3.9 K/UL — SIGNIFICANT CHANGE UP (ref 1.4–6.5)
NEUTROPHILS NFR BLD AUTO: 70.2 % — SIGNIFICANT CHANGE UP (ref 42.2–75.2)
NRBC # BLD: 0 /100 WBCS — SIGNIFICANT CHANGE UP (ref 0–0)
PLATELET # BLD AUTO: 226 K/UL — SIGNIFICANT CHANGE UP (ref 130–400)
POTASSIUM SERPL-MCNC: 3.5 MMOL/L — SIGNIFICANT CHANGE UP (ref 3.5–5)
POTASSIUM SERPL-SCNC: 3.5 MMOL/L — SIGNIFICANT CHANGE UP (ref 3.5–5)
PROT SERPL-MCNC: 5.5 G/DL — LOW (ref 6–8)
PROTHROM AB SERPL-ACNC: 12.3 SEC — SIGNIFICANT CHANGE UP (ref 9.95–12.87)
RBC # BLD: 3.01 M/UL — LOW (ref 4.2–5.4)
RBC # FLD: 15.4 % — HIGH (ref 11.5–14.5)
SARS-COV-2 RNA SPEC QL NAA+PROBE: SIGNIFICANT CHANGE UP
SODIUM SERPL-SCNC: 141 MMOL/L — SIGNIFICANT CHANGE UP (ref 135–146)
WBC # BLD: 5.55 K/UL — SIGNIFICANT CHANGE UP (ref 4.8–10.8)
WBC # FLD AUTO: 5.55 K/UL — SIGNIFICANT CHANGE UP (ref 4.8–10.8)

## 2023-04-10 PROCEDURE — 74177 CT ABD & PELVIS W/CONTRAST: CPT | Mod: 26,MD

## 2023-04-10 PROCEDURE — 86803 HEPATITIS C AB TEST: CPT

## 2023-04-10 PROCEDURE — 97110 THERAPEUTIC EXERCISES: CPT | Mod: GP

## 2023-04-10 PROCEDURE — U0003: CPT

## 2023-04-10 PROCEDURE — 70450 CT HEAD/BRAIN W/O DYE: CPT

## 2023-04-10 PROCEDURE — 73552 X-RAY EXAM OF FEMUR 2/>: CPT | Mod: 26,LT

## 2023-04-10 PROCEDURE — 85730 THROMBOPLASTIN TIME PARTIAL: CPT

## 2023-04-10 PROCEDURE — 73502 X-RAY EXAM HIP UNI 2-3 VIEWS: CPT | Mod: 26,LT

## 2023-04-10 PROCEDURE — 36415 COLL VENOUS BLD VENIPUNCTURE: CPT

## 2023-04-10 PROCEDURE — 99285 EMERGENCY DEPT VISIT HI MDM: CPT | Mod: GC

## 2023-04-10 PROCEDURE — 80048 BASIC METABOLIC PNL TOTAL CA: CPT

## 2023-04-10 PROCEDURE — 97116 GAIT TRAINING THERAPY: CPT | Mod: GP

## 2023-04-10 PROCEDURE — 70450 CT HEAD/BRAIN W/O DYE: CPT | Mod: 26

## 2023-04-10 PROCEDURE — 99223 1ST HOSP IP/OBS HIGH 75: CPT | Mod: AI

## 2023-04-10 PROCEDURE — 97162 PT EVAL MOD COMPLEX 30 MIN: CPT | Mod: GP

## 2023-04-10 PROCEDURE — 85027 COMPLETE CBC AUTOMATED: CPT

## 2023-04-10 PROCEDURE — U0005: CPT

## 2023-04-10 PROCEDURE — 85610 PROTHROMBIN TIME: CPT

## 2023-04-10 PROCEDURE — 71260 CT THORAX DX C+: CPT | Mod: 26,MA

## 2023-04-10 RX ORDER — CHLORHEXIDINE GLUCONATE 213 G/1000ML
1 SOLUTION TOPICAL
Refills: 0 | Status: DISCONTINUED | OUTPATIENT
Start: 2023-04-10 | End: 2023-04-14

## 2023-04-10 RX ORDER — BUDESONIDE AND FORMOTEROL FUMARATE DIHYDRATE 160; 4.5 UG/1; UG/1
1 AEROSOL RESPIRATORY (INHALATION)
Refills: 0 | Status: DISCONTINUED | OUTPATIENT
Start: 2023-04-10 | End: 2023-04-11

## 2023-04-10 RX ORDER — LEVOTHYROXINE SODIUM 125 MCG
50 TABLET ORAL
Refills: 0 | Status: DISCONTINUED | OUTPATIENT
Start: 2023-04-10 | End: 2023-04-14

## 2023-04-10 RX ORDER — PREGABALIN 225 MG/1
1000 CAPSULE ORAL DAILY
Refills: 0 | Status: DISCONTINUED | OUTPATIENT
Start: 2023-04-10 | End: 2023-04-14

## 2023-04-10 RX ORDER — METOPROLOL SUCCINATE AND HYDROCHLOROTHIAZIDE 100; 12.5 MG/1; MG/1
1 TABLET, FILM COATED ORAL
Refills: 0 | DISCHARGE

## 2023-04-10 RX ORDER — PREGABALIN 225 MG/1
1 CAPSULE ORAL
Refills: 0 | DISCHARGE

## 2023-04-10 RX ORDER — RIVAROXABAN 15 MG-20MG
15 KIT ORAL
Refills: 0 | Status: DISCONTINUED | OUTPATIENT
Start: 2023-04-10 | End: 2023-04-14

## 2023-04-10 RX ORDER — METOPROLOL TARTRATE 50 MG
50 TABLET ORAL DAILY
Refills: 0 | Status: DISCONTINUED | OUTPATIENT
Start: 2023-04-10 | End: 2023-04-10

## 2023-04-10 RX ORDER — ACETAMINOPHEN 500 MG
650 TABLET ORAL EVERY 6 HOURS
Refills: 0 | Status: DISCONTINUED | OUTPATIENT
Start: 2023-04-10 | End: 2023-04-14

## 2023-04-10 RX ORDER — VITAMIN E 100 UNIT
0 CAPSULE ORAL
Refills: 0 | DISCHARGE

## 2023-04-10 RX ORDER — MORPHINE SULFATE 50 MG/1
4 CAPSULE, EXTENDED RELEASE ORAL ONCE
Refills: 0 | Status: DISCONTINUED | OUTPATIENT
Start: 2023-04-10 | End: 2023-04-10

## 2023-04-10 RX ORDER — TIOTROPIUM BROMIDE 18 UG/1
2 CAPSULE ORAL; RESPIRATORY (INHALATION) DAILY
Refills: 0 | Status: DISCONTINUED | OUTPATIENT
Start: 2023-04-10 | End: 2023-04-14

## 2023-04-10 RX ORDER — ATORVASTATIN CALCIUM 80 MG/1
10 TABLET, FILM COATED ORAL AT BEDTIME
Refills: 0 | Status: DISCONTINUED | OUTPATIENT
Start: 2023-04-10 | End: 2023-04-14

## 2023-04-10 RX ORDER — FOLIC ACID 0.8 MG
1 TABLET ORAL DAILY
Refills: 0 | Status: DISCONTINUED | OUTPATIENT
Start: 2023-04-10 | End: 2023-04-14

## 2023-04-10 RX ORDER — METOPROLOL TARTRATE 50 MG
50 TABLET ORAL DAILY
Refills: 0 | Status: DISCONTINUED | OUTPATIENT
Start: 2023-04-10 | End: 2023-04-14

## 2023-04-10 RX ORDER — LEVOTHYROXINE SODIUM 125 MCG
75 TABLET ORAL
Refills: 0 | Status: DISCONTINUED | OUTPATIENT
Start: 2023-04-10 | End: 2023-04-14

## 2023-04-10 RX ORDER — LANOLIN ALCOHOL/MO/W.PET/CERES
3 CREAM (GRAM) TOPICAL AT BEDTIME
Refills: 0 | Status: DISCONTINUED | OUTPATIENT
Start: 2023-04-10 | End: 2023-04-14

## 2023-04-10 RX ORDER — RIVAROXABAN 15 MG-20MG
20 KIT ORAL
Refills: 0 | Status: DISCONTINUED | OUTPATIENT
Start: 2023-04-10 | End: 2023-04-10

## 2023-04-10 RX ORDER — POLYETHYLENE GLYCOL 3350 17 G/17G
17 POWDER, FOR SOLUTION ORAL DAILY
Refills: 0 | Status: DISCONTINUED | OUTPATIENT
Start: 2023-04-10 | End: 2023-04-14

## 2023-04-10 RX ORDER — WARFARIN SODIUM 2.5 MG/1
0 TABLET ORAL
Refills: 0 | DISCHARGE

## 2023-04-10 RX ORDER — ONDANSETRON 8 MG/1
4 TABLET, FILM COATED ORAL EVERY 8 HOURS
Refills: 0 | Status: DISCONTINUED | OUTPATIENT
Start: 2023-04-10 | End: 2023-04-14

## 2023-04-10 RX ORDER — MORPHINE SULFATE 50 MG/1
2 CAPSULE, EXTENDED RELEASE ORAL EVERY 4 HOURS
Refills: 0 | Status: DISCONTINUED | OUTPATIENT
Start: 2023-04-10 | End: 2023-04-14

## 2023-04-10 RX ORDER — LISINOPRIL 2.5 MG/1
10 TABLET ORAL DAILY
Refills: 0 | Status: DISCONTINUED | OUTPATIENT
Start: 2023-04-10 | End: 2023-04-14

## 2023-04-10 RX ORDER — MORPHINE SULFATE 50 MG/1
2 CAPSULE, EXTENDED RELEASE ORAL ONCE
Refills: 0 | Status: DISCONTINUED | OUTPATIENT
Start: 2023-04-10 | End: 2023-04-10

## 2023-04-10 RX ADMIN — BUDESONIDE AND FORMOTEROL FUMARATE DIHYDRATE 1 PUFF(S): 160; 4.5 AEROSOL RESPIRATORY (INHALATION) at 20:12

## 2023-04-10 RX ADMIN — MORPHINE SULFATE 2 MILLIGRAM(S): 50 CAPSULE, EXTENDED RELEASE ORAL at 20:14

## 2023-04-10 RX ADMIN — MORPHINE SULFATE 4 MILLIGRAM(S): 50 CAPSULE, EXTENDED RELEASE ORAL at 14:20

## 2023-04-10 RX ADMIN — RIVAROXABAN 15 MILLIGRAM(S): KIT at 18:37

## 2023-04-10 RX ADMIN — LISINOPRIL 10 MILLIGRAM(S): 2.5 TABLET ORAL at 18:37

## 2023-04-10 RX ADMIN — ATORVASTATIN CALCIUM 10 MILLIGRAM(S): 80 TABLET, FILM COATED ORAL at 18:37

## 2023-04-10 RX ADMIN — MORPHINE SULFATE 2 MILLIGRAM(S): 50 CAPSULE, EXTENDED RELEASE ORAL at 16:33

## 2023-04-10 RX ADMIN — MORPHINE SULFATE 2 MILLIGRAM(S): 50 CAPSULE, EXTENDED RELEASE ORAL at 21:07

## 2023-04-10 RX ADMIN — Medication 1 MILLIGRAM(S): at 18:36

## 2023-04-10 RX ADMIN — Medication 50 MILLIGRAM(S): at 18:36

## 2023-04-10 NOTE — H&P ADULT - NSICDXPASTMEDICALHX_GEN_ALL_CORE_FT
PAST MEDICAL HISTORY:  Atrial fibrillation     HTN (hypertension)     Hyperlipidemia     Hypothyroid      PAST MEDICAL HISTORY:  Atrial fibrillation     History of blood clotting disorder     HTN (hypertension)     Hyperlipidemia     Hypothyroid

## 2023-04-10 NOTE — ED PROVIDER NOTE - OBJECTIVE STATEMENT
73F w/ pmhx of DVT on eliquis s/p IVC filter, hypothyroidism, COPD not on home O2, HTN, HLD who p/w 5d of left hip pain s/p trip and fall. has increased difficulty with ambulation due to pain. Denies HT LOC CP SOB N V HA F C numbness or weakness. She has h/o of left pelvic fracture in 2018 treated non-operatively.72 y/o F PMHx clotting disorder (on Eliquis),s/p IV filter in 2013, hypothyroidism, COPD, HTN, HLD presented to  ED for 5 day h/o L hip pain s/p mechanical fall and decreased ambulation 2/2 pain.  Pt denies LOC, chest pain, n/v, fever, headache, dizziness, visual changes. numbness/ tingling of extremities.  Pt reports h/o L pelvic fx in 2018, tx'ed w/ conservative management

## 2023-04-10 NOTE — ED ADULT NURSE NOTE - NSIMPLEMENTINTERV_GEN_ALL_ED
Implemented All Fall with Harm Risk Interventions:  [place] to call system. Call bell, personal items and telephone within reach. Instruct patient to call for assistance. Room bathroom lighting operational. Non-slip footwear when patient is off stretcher. Physically safe environment: no spills, clutter or unnecessary equipment. Stretcher in lowest position, wheels locked, appropriate side rails in place. Provide visual cue, wrist band, yellow gown, etc. Monitor gait and stability. Monitor for mental status changes and reorient to person, place, and time. Review medications for side effects contributing to fall risk. Reinforce activity limits and safety measures with patient and family. Provide visual clues: red socks.

## 2023-04-10 NOTE — H&P ADULT - NSHPPHYSICALEXAM_GEN_ALL_CORE
T(C): 35.8 (04-10-23 @ 13:24), Max: 35.8 (04-10-23 @ 13:24)  HR: 81 (04-10-23 @ 16:42) (81 - 93)  BP: 181/86 (04-10-23 @ 16:42) (181/86 - 202/96)  RR: 18 (04-10-23 @ 16:42) (18 - 18)  SpO2: 99% (04-10-23 @ 16:42) (99% - 99%)    PHYSICAL EXAM:  GENERAL: laying in bed, appearing uncomfortable 2/2 pain per pt; in NAD  HEENT: Moist mucous membranes  NECK: Supple  CHEST/LUNG: even respirations b/l; no accessory muscle use  ABDOMEN: Soft, Nontender, Nondistended  EXTREMITIES:   No calf TTP/ swelling b/l  SKIN: warm  Neuro: , A&Ox4; sensation intact of b/l LEs; LLE ROM limited 2/2 pain

## 2023-04-10 NOTE — H&P ADULT - NSHPSOCIALHISTORY_GEN_ALL_CORE
pt reports h/o smoking ( quit 16 years ago)  pt reports h/o alcohol use " on special occasion"  pt denies h/o illicit drug use

## 2023-04-10 NOTE — ED PROVIDER NOTE - PHYSICAL EXAMINATION
CONSTITUTIONAL: no acute distress  SKIN: warm, dry  HEAD: Normocephalic  EYES: no conjunctival erythema  ENT: no nasal discharge, airway clear  NECK: full ROM  CARD: regular rate and rhythm  RESP: normal respiratory effort, no wheezes, rales or rhonchi  ABD: soft, non-distended, non-tender  EXT: moving all extremities spontaneously nontender extremities no hematoma noted on extremities +2 dp b/l  NEURO: alert and oriented x3, 5/5 strength and sensation b/l LE   PSYCH: cooperative, appropriate

## 2023-04-10 NOTE — ED ADULT NURSE NOTE - NSICDXPASTMEDICALHX_GEN_ALL_CORE_FT
No PAST MEDICAL HISTORY:  Atrial fibrillation     HTN (hypertension)     Hyperlipidemia     Hypothyroid

## 2023-04-10 NOTE — ED PROVIDER NOTE - ATTENDING CONTRIBUTION TO CARE
Patient complains of left hip pain and left buttocks pain.  Pain is increased by sitting on the toilet.  She is able to stand with significant assistance.  However, it causes significant pain.  Symptoms started 5 days ago after a fall.  She denies dizziness, head trauma.  Vital signs noted.  Appears uncomfortable.  There is tenderness in the left buttocks and left ischial tuberosity.  There is full range of motion of the left hip however it is somewhat painful.  Distal neurovascular function is intact.  The abdomen is nontender.  The bladder is somewhat distended.  X-rays reviewed.  There appears to be an ischial fracture.  CT ordered to delineate the injury more precisely, and intra abdominal or retroperitoneal hematoma should be excluded as well.

## 2023-04-10 NOTE — H&P ADULT - HISTORY OF PRESENT ILLNESS
72 y/o F PMHx clotting disorder (on Eliquis),s/p IV filter in 2013, hypothyroidism, COPD, HTN, HLD presented to  ED for 5 day h/o L hip pain s/p fall and decreased ambulation 2/2 pain.  Pt denies LOC, chest pain, n/v, fever, headache, dizziness, visual changes. numbness/ tingling of extremities.  Pt reports h/o L pelvic fx in 2018, tx'ed w/ conservative management    74 y/o F PMHx clotting disorder (on Eliquis),s/p IV filter in 2013, hypothyroidism, COPD, HTN, HLD presented to  ED for 5 day h/o L hip pain s/p mechanical fall and decreased ambulation 2/2 pain.  Pt denies LOC, chest pain, n/v, fever, headache, dizziness, visual changes. numbness/ tingling of extremities.  Pt reports h/o L pelvic fx in 2018, tx'ed w/ conservative management     Patient reports that she slide down, and fell on her buttocks.  denies any CP, Neck pain, back pain, Numbness, weakness, LOC, headache, blurry vision, or cough

## 2023-04-10 NOTE — ED PROVIDER NOTE - PROGRESS NOTE DETAILS
RK: spoke with ortho resident Dr. Gaviria, discussed case, recommend non-operative management, pt to be admitted south with non-emergent ortho consult to see her here.

## 2023-04-10 NOTE — ED PROVIDER NOTE - NS ED ROS FT
Constitutional: No fever   Eyes:  No visual changes  Ears:  No hearing changes  Neck: No neck pain  Cardiac:  No chest pain  Respiratory:  No SOB   GI:  No abdominal pain, nausea, or vomiting  :  No dysuria  MS:  No back pain +hip pain  Neuro:  No headache or weakness.  No LOC  Skin:  No skin rash

## 2023-04-10 NOTE — ED PROVIDER NOTE - NSICDXPASTMEDICALHX_GEN_ALL_CORE_FT
PAST MEDICAL HISTORY:  Atrial fibrillation     HTN (hypertension)     Hyperlipidemia     Hypothyroid

## 2023-04-10 NOTE — H&P ADULT - ASSESSMENT
74 y/o F PMHx clotting disorder (on Eliquis),s/p IV filter in 2013, hypothyroidism, COPD, HTN, HLD presented to  ED for 5 day h/o L hip pain s/p fall and decreased ambulation 2/2 pain.  Pt denies LOC, chest pain, n/v, fever, headache, dizziness, visual changes. numbness/ tingling of extremities. Pt reports h/o L pelvic fx in 2018, tx'ed w/ conservative management. In ED,  imaging demonstrated L inferior pubic ramus fx and L anterior superior pubic ramus fx, noted to be appearinge acute on chronic  fxs. No evidence of thoracic, abdominal or pelvic visceral injury, laceration, or hematoma.    Plan:  Admit    # L ramus  fx  - ortho consult  -pain control    # h/o clotting disorder   -c/w   Eliquis    # HTN  -c/w  metoprolol and lisinopril    # Hypothyroidism  -c/w synthroid MWF and levothyroxine T, TH, S, Sun    # HLD  - c/w statin    #COPD  c/w Advair and Spiriva   74 y/o F PMHx clotting disorder (on Eliquis),s/p IV filter in 2013, hypothyroidism, COPD, HTN, HLD presented to  ED for 5 day h/o L hip pain s/p fall and decreased ambulation 2/2 pain.  Pt denies LOC, chest pain, n/v, fever, headache, dizziness, visual changes. numbness/ tingling of extremities. Pt reports h/o L pelvic fx in 2018, tx'ed w/ conservative management. In ED,  imaging demonstrated L inferior pubic ramus fx and L anterior superior pubic ramus fx, noted to be appearinge acute on chronic  fxs. No evidence of thoracic, abdominal or pelvic visceral injury, laceration, or hematoma.    Plan:  Admit    # L ramus  fx/fall  -abdm/pelvic/chest CT scan noticed  -obtain head CT scan urgent  - ortho consult  -pain control  -PT    # h/o clotting disorder   -c/w   Eliquis    # HTN  -c/w  metoprolol and lisinopril    # Hypothyroidism  -c/w synthroid MWF and levothyroxine T, TH, S, Sun    # HLD  - c/w statin    #COPD  c/w Advair and Spiriva      #Progress Note Handoff  Pending (specify):  as above   Family discussion:  plan of care was discussed with patient and family in details.  all questions were answered.  seems to understand, and in agreement  Disposition:  PT   72 y/o F PMHx clotting disorder (on Eliquis),s/p IV filter in 2013, hypothyroidism, COPD, HTN, HLD presented to  ED for 5 day h/o L hip pain s/p fall and decreased ambulation 2/2 pain.  Pt denies LOC, chest pain, n/v, fever, headache, dizziness, visual changes. numbness/ tingling of extremities. Pt reports h/o L pelvic fx in 2018, tx'ed w/ conservative management. In ED,  imaging demonstrated L inferior pubic ramus fx and L anterior superior pubic ramus fx, noted to be appearing acute on chronic  fxs. No evidence of thoracic, abdominal or pelvic visceral injury, laceration, or hematoma.    Plan:  Admit    # L ramus  fx/fall  -abdm/pelvic/chest CT scan noticed  -obtain head CT scan urgent  - ortho consult  -pain control  -PT    # h/o clotting disorder   -c/w   Eliquis ( 15mg QHS instead of home dose 20mg QHS d/t kidney function per pharmacist)    # HTN  -c/w  metoprolol and lisinopril    # Hypothyroidism  -c/w synthroid MWF and levothyroxine T, TH, S, Sun    # HLD  - c/w statin    #COPD  c/w Advair and Spiriva    VTE:  home Xarelto   #Progress Note Handoff  Pending (specify):  as above   Family discussion:  plan of care was discussed with patient and family in details.  all questions were answered.  seems to understand, and in agreement  Disposition:  PT

## 2023-04-10 NOTE — H&P ADULT - NSHPLABSRESULTS_GEN_ALL_CORE
10.8   5.55  )-----------( 226      ( 10 Apr 2023 14:25 )             32.0       04-10    141  |  102  |  21<H>  ----------------------------<  96  3.5   |  25  |  0.8    Ca    9.3      10 Apr 2023 14:25    TPro  5.5<L>  /  Alb  3.5  /  TBili  1.0  /  DBili  x   /  AST  41  /  ALT  16  /  AlkPhos  109  04-10          EXAM:  CT ABDOMEN AND PELVIS IC   EXAM:  CT CHEST IC       PROCEDURE DATE:  04/10/2023      FINDINGS   HEART AND VESSELS  Aortic calcifications are noted. There is no evidence of aortic aneurysm or dissection.  There is an aberrant right subclavian artery.   AIRWAYS, LUNGS AND PLEURA: The central tracheobronchial tree is patent.   There are panlobular emphysematous changes throughout both lung fields.   Multiple small calcified granulomas are noted within both lung fields.   Reticular opacities, likely fibrotic, are noted in the right apex..    HEPATIC: Two adjacent subcentimeter   hypodense lesions too small to be further evaluated are noted in the   right lobe of the liver. The portal vein is patent. The hepatic veins are   opacified.  BILIARY: Post cholecystectomy with bile duct dilatation that may be seen following cholecystectomy..    KIDNEYS: There is symmetric renal enhancement. Left subcentimeter   hypodense cortical lesion too small for further characterization.    PERITONEUM/MESENTERY/BOWEL: Moderate hiatus hernia.   BONES/SOFT TISSUES: Degenerative changes of the spine are noted. There   are fractures of the left inferior pubic ramus and fracture at the left   anterior superior pubic ramus that appear to be acute on chronic   fractures. Mild compression deformity at T10 of indeterminate age. Mild   anterolisthesis at L5-S1.    OTHER: Aortoiliac calcifications are noted with no evidence of abdominal   aortic aneurysm. IVC filter.    IMPRESSION:    There are fractures of the left inferior pubic ramus and fracture at the   left anterior superior pubic ramus that appear to be acute on chronic   fractures.    No evidence of thoracic, abdominal or pelvic visceral injury, laceration,   or hematoma    --- End of Report ---            PT/INR - ( 10 Apr 2023 14:25 )   PT: 12.30 sec;   INR: 1.08 ratio         PTT - ( 10 Apr 2023 14:25 )  PTT:31.7 sec          CAPILLARY BLOOD GLUCOSE

## 2023-04-11 LAB
ANION GAP SERPL CALC-SCNC: 15 MMOL/L — HIGH (ref 7–14)
APTT BLD: 32.9 SEC — SIGNIFICANT CHANGE UP (ref 27–39.2)
BUN SERPL-MCNC: 18 MG/DL — SIGNIFICANT CHANGE UP (ref 10–20)
CALCIUM SERPL-MCNC: 9.1 MG/DL — SIGNIFICANT CHANGE UP (ref 8.4–10.5)
CHLORIDE SERPL-SCNC: 103 MMOL/L — SIGNIFICANT CHANGE UP (ref 98–110)
CO2 SERPL-SCNC: 23 MMOL/L — SIGNIFICANT CHANGE UP (ref 17–32)
CREAT SERPL-MCNC: 0.7 MG/DL — SIGNIFICANT CHANGE UP (ref 0.7–1.5)
EGFR: 91 ML/MIN/1.73M2 — SIGNIFICANT CHANGE UP
GLUCOSE SERPL-MCNC: 77 MG/DL — SIGNIFICANT CHANGE UP (ref 70–99)
HCT VFR BLD CALC: 29.2 % — LOW (ref 37–47)
HCV AB S/CO SERPL IA: 0.03 COI — SIGNIFICANT CHANGE UP
HCV AB SERPL-IMP: SIGNIFICANT CHANGE UP
HGB BLD-MCNC: 9.8 G/DL — LOW (ref 12–16)
INR BLD: 1.16 RATIO — SIGNIFICANT CHANGE UP (ref 0.65–1.3)
MCHC RBC-ENTMCNC: 33.6 G/DL — SIGNIFICANT CHANGE UP (ref 32–37)
MCHC RBC-ENTMCNC: 36.3 PG — HIGH (ref 27–31)
MCV RBC AUTO: 108.1 FL — HIGH (ref 81–99)
NRBC # BLD: 0 /100 WBCS — SIGNIFICANT CHANGE UP (ref 0–0)
PLATELET # BLD AUTO: 207 K/UL — SIGNIFICANT CHANGE UP (ref 130–400)
POTASSIUM SERPL-MCNC: 3.8 MMOL/L — SIGNIFICANT CHANGE UP (ref 3.5–5)
POTASSIUM SERPL-SCNC: 3.8 MMOL/L — SIGNIFICANT CHANGE UP (ref 3.5–5)
PROTHROM AB SERPL-ACNC: 13.3 SEC — HIGH (ref 9.95–12.87)
RBC # BLD: 2.7 M/UL — LOW (ref 4.2–5.4)
RBC # FLD: 15.5 % — HIGH (ref 11.5–14.5)
SODIUM SERPL-SCNC: 141 MMOL/L — SIGNIFICANT CHANGE UP (ref 135–146)
WBC # BLD: 4.79 K/UL — LOW (ref 4.8–10.8)
WBC # FLD AUTO: 4.79 K/UL — LOW (ref 4.8–10.8)

## 2023-04-11 PROCEDURE — 99233 SBSQ HOSP IP/OBS HIGH 50: CPT

## 2023-04-11 PROCEDURE — 99221 1ST HOSP IP/OBS SF/LOW 40: CPT

## 2023-04-11 RX ORDER — MORPHINE SULFATE 50 MG/1
1 CAPSULE, EXTENDED RELEASE ORAL ONCE
Refills: 0 | Status: DISCONTINUED | OUTPATIENT
Start: 2023-04-11 | End: 2023-04-11

## 2023-04-11 RX ADMIN — Medication 1 MILLIGRAM(S): at 18:46

## 2023-04-11 RX ADMIN — MORPHINE SULFATE 1 MILLIGRAM(S): 50 CAPSULE, EXTENDED RELEASE ORAL at 00:03

## 2023-04-11 RX ADMIN — PREGABALIN 1000 MICROGRAM(S): 225 CAPSULE ORAL at 11:31

## 2023-04-11 RX ADMIN — MORPHINE SULFATE 2 MILLIGRAM(S): 50 CAPSULE, EXTENDED RELEASE ORAL at 05:30

## 2023-04-11 RX ADMIN — MORPHINE SULFATE 1 MILLIGRAM(S): 50 CAPSULE, EXTENDED RELEASE ORAL at 00:24

## 2023-04-11 RX ADMIN — MORPHINE SULFATE 2 MILLIGRAM(S): 50 CAPSULE, EXTENDED RELEASE ORAL at 21:01

## 2023-04-11 RX ADMIN — LISINOPRIL 10 MILLIGRAM(S): 2.5 TABLET ORAL at 18:45

## 2023-04-11 RX ADMIN — Medication 50 MILLIGRAM(S): at 18:46

## 2023-04-11 RX ADMIN — POLYETHYLENE GLYCOL 3350 17 GRAM(S): 17 POWDER, FOR SOLUTION ORAL at 11:31

## 2023-04-11 RX ADMIN — MORPHINE SULFATE 2 MILLIGRAM(S): 50 CAPSULE, EXTENDED RELEASE ORAL at 06:01

## 2023-04-11 RX ADMIN — MORPHINE SULFATE 2 MILLIGRAM(S): 50 CAPSULE, EXTENDED RELEASE ORAL at 20:22

## 2023-04-11 RX ADMIN — RIVAROXABAN 15 MILLIGRAM(S): KIT at 18:45

## 2023-04-11 RX ADMIN — MORPHINE SULFATE 2 MILLIGRAM(S): 50 CAPSULE, EXTENDED RELEASE ORAL at 10:29

## 2023-04-11 RX ADMIN — MORPHINE SULFATE 2 MILLIGRAM(S): 50 CAPSULE, EXTENDED RELEASE ORAL at 09:31

## 2023-04-11 RX ADMIN — Medication 50 MICROGRAM(S): at 05:30

## 2023-04-11 RX ADMIN — ATORVASTATIN CALCIUM 10 MILLIGRAM(S): 80 TABLET, FILM COATED ORAL at 18:46

## 2023-04-11 RX ADMIN — Medication 0.1 MILLIGRAM(S): at 00:23

## 2023-04-11 RX ADMIN — MORPHINE SULFATE 2 MILLIGRAM(S): 50 CAPSULE, EXTENDED RELEASE ORAL at 16:46

## 2023-04-11 RX ADMIN — MORPHINE SULFATE 2 MILLIGRAM(S): 50 CAPSULE, EXTENDED RELEASE ORAL at 15:02

## 2023-04-11 RX ADMIN — TIOTROPIUM BROMIDE 2 PUFF(S): 18 CAPSULE ORAL; RESPIRATORY (INHALATION) at 10:56

## 2023-04-11 NOTE — DIETITIAN INITIAL EVALUATION ADULT - PERTINENT LABORATORY DATA
04-11    141  |  103  |  18  ----------------------------<  77  3.8   |  23  |  0.7    Ca    9.1      11 Apr 2023 06:35    TPro  5.5<L>  /  Alb  3.5  /  TBili  1.0  /  DBili  x   /  AST  41  /  ALT  16  /  AlkPhos  109  04-10                          9.8    4.79  )-----------( 207      ( 11 Apr 2023 06:35 )             29.2

## 2023-04-11 NOTE — DIETITIAN INITIAL EVALUATION ADULT - ORAL INTAKE PTA/DIET HISTORY
as per pt generally consumes a regular diet with good po intake however since fall has been unable to sit up long enough to complete a meal. No hx noted in EMR for weight comparison. pt states her UBW is ~100#  which is comparable to present weight    presently on a DASH/TLC diet tolerating well however only consuming 50% of meals due to difficulty sitting up 2/2 hip pain, pt receptive to ensure at this time to help meet estimated nutrient needs

## 2023-04-11 NOTE — DIETITIAN INITIAL EVALUATION ADULT - PERTINENT MEDS FT
MEDICATIONS  (STANDING):  atorvastatin 10 milliGRAM(s) Oral at bedtime  cyanocobalamin 1000 MICROGram(s) Oral daily  folic acid 1 milliGRAM(s) Oral daily  levothyroxine 50 MICROGram(s) Oral <User Schedule>  levothyroxine 75 MICROGram(s) Oral <User Schedule>  lisinopril 10 milliGRAM(s) Oral daily  metoprolol succinate ER 50 milliGRAM(s) Oral daily  polyethylene glycol 3350 17 Gram(s) Oral daily  rivaroxaban 15 milliGRAM(s) Oral with dinner  tiotropium 2.5 MICROgram(s) Inhaler 2 Puff(s) Inhalation daily  wixela(fluticasone&amp;salmetrol powder) 1 Inhalation 1 Inhalation Inhalation two times a day    MEDICATIONS  (PRN):  acetaminophen     Tablet .. 650 milliGRAM(s) Oral every 6 hours PRN Temp greater or equal to 38C (100.4F), Mild Pain (1 - 3)  aluminum hydroxide/magnesium hydroxide/simethicone Suspension 30 milliLiter(s) Oral every 4 hours PRN Dyspepsia  melatonin 3 milliGRAM(s) Oral at bedtime PRN Insomnia  morphine  - Injectable 2 milliGRAM(s) IV Push every 4 hours PRN Severe Pain (7 - 10)  ondansetron Injectable 4 milliGRAM(s) IV Push every 8 hours PRN Nausea and/or Vomiting  oxycodone    5 mG/acetaminophen 325 mG 2 Tablet(s) Oral every 6 hours PRN Moderate Pain (4 - 6)

## 2023-04-11 NOTE — DIETITIAN INITIAL EVALUATION ADULT - OTHER INFO
pt is 73 year old female with hx of afib, clotting disorder s/p IVC filter 2013, hypothyroidism, COPD, HTN, HLD, L pelvic fx in 2018 with conservative management presents with L pain x 5 days 2/2 mechanical fall. admitted with L ramus fx

## 2023-04-11 NOTE — DIETITIAN INITIAL EVALUATION ADULT - NSICDXPASTMEDICALHX_GEN_ALL_CORE_FT
PAST MEDICAL HISTORY:  Atrial fibrillation     History of blood clotting disorder     HTN (hypertension)     Hyperlipidemia     Hypothyroid

## 2023-04-12 PROCEDURE — 99232 SBSQ HOSP IP/OBS MODERATE 35: CPT

## 2023-04-12 RX ORDER — SENNA PLUS 8.6 MG/1
2 TABLET ORAL AT BEDTIME
Refills: 0 | Status: DISCONTINUED | OUTPATIENT
Start: 2023-04-12 | End: 2023-04-14

## 2023-04-12 RX ADMIN — Medication 50 MILLIGRAM(S): at 18:04

## 2023-04-12 RX ADMIN — Medication 1 MILLIGRAM(S): at 18:03

## 2023-04-12 RX ADMIN — LISINOPRIL 10 MILLIGRAM(S): 2.5 TABLET ORAL at 18:04

## 2023-04-12 RX ADMIN — Medication 0.1 MILLIGRAM(S): at 06:09

## 2023-04-12 RX ADMIN — PREGABALIN 1000 MICROGRAM(S): 225 CAPSULE ORAL at 16:01

## 2023-04-12 RX ADMIN — MORPHINE SULFATE 2 MILLIGRAM(S): 50 CAPSULE, EXTENDED RELEASE ORAL at 03:39

## 2023-04-12 RX ADMIN — ATORVASTATIN CALCIUM 10 MILLIGRAM(S): 80 TABLET, FILM COATED ORAL at 21:19

## 2023-04-12 RX ADMIN — POLYETHYLENE GLYCOL 3350 17 GRAM(S): 17 POWDER, FOR SOLUTION ORAL at 11:32

## 2023-04-12 RX ADMIN — MORPHINE SULFATE 2 MILLIGRAM(S): 50 CAPSULE, EXTENDED RELEASE ORAL at 03:24

## 2023-04-12 RX ADMIN — TIOTROPIUM BROMIDE 2 PUFF(S): 18 CAPSULE ORAL; RESPIRATORY (INHALATION) at 09:38

## 2023-04-12 RX ADMIN — Medication 5 MILLIGRAM(S): at 21:20

## 2023-04-12 RX ADMIN — MORPHINE SULFATE 2 MILLIGRAM(S): 50 CAPSULE, EXTENDED RELEASE ORAL at 20:05

## 2023-04-12 RX ADMIN — MORPHINE SULFATE 2 MILLIGRAM(S): 50 CAPSULE, EXTENDED RELEASE ORAL at 16:01

## 2023-04-12 RX ADMIN — Medication 75 MICROGRAM(S): at 06:09

## 2023-04-12 RX ADMIN — MORPHINE SULFATE 2 MILLIGRAM(S): 50 CAPSULE, EXTENDED RELEASE ORAL at 16:31

## 2023-04-12 RX ADMIN — SENNA PLUS 2 TABLET(S): 8.6 TABLET ORAL at 21:19

## 2023-04-12 RX ADMIN — MORPHINE SULFATE 2 MILLIGRAM(S): 50 CAPSULE, EXTENDED RELEASE ORAL at 12:20

## 2023-04-12 RX ADMIN — RIVAROXABAN 15 MILLIGRAM(S): KIT at 18:04

## 2023-04-12 RX ADMIN — MORPHINE SULFATE 2 MILLIGRAM(S): 50 CAPSULE, EXTENDED RELEASE ORAL at 21:00

## 2023-04-12 RX ADMIN — MORPHINE SULFATE 2 MILLIGRAM(S): 50 CAPSULE, EXTENDED RELEASE ORAL at 11:50

## 2023-04-12 NOTE — PHYSICAL THERAPY INITIAL EVALUATION ADULT - LEVEL OF INDEPENDENCE: GAIT, REHAB EVAL
Did not observe this session,, pt unable to maintain static standing 2/2 pain , W/ transition to ambulation

## 2023-04-12 NOTE — PHYSICAL THERAPY INITIAL EVALUATION ADULT - PERTINENT HX OF CURRENT PROBLEM, REHAB EVAL
74 y/o F PMHx clotting disorder (on Eliquis),s/p IV filter in 2013, hypothyroidism, COPD, HTN, HLD presented to  ED for 5 day h/o L hip pain s/p mechanical fall and decreased ambulation 2/2 pain.  Pt denies LOC, chest pain, n/v, fever, headache, dizziness, visual changes. numbness/ tingling of extremities.  Pt reports h/o L pelvic fx in 2018, tx'ed w/ conservative management     Patient reports that she slide down, and fell on her buttocks.  denies any CP, Neck pain, back pain, Numbness, weakness, LOC, headache, blurry vision, or cough

## 2023-04-12 NOTE — PHYSICAL THERAPY INITIAL EVALUATION ADULT - GENERAL OBSERVATIONS, REHAB EVAL
13:05 -13:30 Chart reviewed. Order received.  Patient is ok to be  seen for PT,confirmed with RN. pt encountered  Semi palencia in the bed , c/o lt hip and pelvic pain, Rn Georges aware  and agrees to participate in session, +  Heplock  + Prima fit  ,NAD.

## 2023-04-12 NOTE — PHYSICAL THERAPY INITIAL EVALUATION ADULT - SPECIFY REASON(S)
Chart Reviewed , Attempted to see the pt this PM for Skill PT , Pt confused and unable to f/u simple command at this time , Will hold PT and F/u as appropriate, ANGELIQUE Reddy aware. Chart Reviewed , Attempted to see the pt this PM for Skill PT , Pt with lot of pain at lt hip , Requested to defer PT for later time  Will hold  PT at this time and F/u as appropriate.

## 2023-04-12 NOTE — PHYSICAL THERAPY INITIAL EVALUATION ADULT - ADDITIONAL COMMENTS
pt is 72 y/o F  , lives with   in  , information obtain from the pt and  at bedside , has 5  steps to enter with BL HR and 5  steps to the enter from garage W/ Rt HR  , pt was independent  with bed mobility , transfer , ambulation ,stair negotiation and basic ADLS PTA.

## 2023-04-13 ENCOUNTER — TRANSCRIPTION ENCOUNTER (OUTPATIENT)
Age: 73
End: 2023-04-13

## 2023-04-13 PROCEDURE — 99231 SBSQ HOSP IP/OBS SF/LOW 25: CPT

## 2023-04-13 RX ADMIN — MORPHINE SULFATE 2 MILLIGRAM(S): 50 CAPSULE, EXTENDED RELEASE ORAL at 14:09

## 2023-04-13 RX ADMIN — PREGABALIN 1000 MICROGRAM(S): 225 CAPSULE ORAL at 11:10

## 2023-04-13 RX ADMIN — Medication 5 MILLIGRAM(S): at 21:12

## 2023-04-13 RX ADMIN — Medication 50 MICROGRAM(S): at 05:00

## 2023-04-13 RX ADMIN — POLYETHYLENE GLYCOL 3350 17 GRAM(S): 17 POWDER, FOR SOLUTION ORAL at 11:10

## 2023-04-13 RX ADMIN — MORPHINE SULFATE 2 MILLIGRAM(S): 50 CAPSULE, EXTENDED RELEASE ORAL at 16:10

## 2023-04-13 RX ADMIN — Medication 1 MILLIGRAM(S): at 17:08

## 2023-04-13 RX ADMIN — ATORVASTATIN CALCIUM 10 MILLIGRAM(S): 80 TABLET, FILM COATED ORAL at 21:12

## 2023-04-13 RX ADMIN — SENNA PLUS 2 TABLET(S): 8.6 TABLET ORAL at 21:12

## 2023-04-13 RX ADMIN — Medication 50 MILLIGRAM(S): at 17:08

## 2023-04-13 RX ADMIN — TIOTROPIUM BROMIDE 2 PUFF(S): 18 CAPSULE ORAL; RESPIRATORY (INHALATION) at 11:10

## 2023-04-13 RX ADMIN — RIVAROXABAN 15 MILLIGRAM(S): KIT at 17:09

## 2023-04-13 RX ADMIN — LISINOPRIL 10 MILLIGRAM(S): 2.5 TABLET ORAL at 17:08

## 2023-04-13 NOTE — DISCHARGE NOTE PROVIDER - NSDCCPCAREPLAN_GEN_ALL_CORE_FT
PRINCIPAL DISCHARGE DIAGNOSIS  Diagnosis: Fracture of multiple pubic rami  Assessment and Plan of Treatment: You were evaluated by orthopedics while inpatient, who stated that you were not a candidate for surgery. You were also placed on a pain management and saw the physcial therapist while inpatient.     PRINCIPAL DISCHARGE DIAGNOSIS  Diagnosis: Fracture of multiple pubic rami  Assessment and Plan of Treatment: You were evaluated by orthopedics while inpatient, who stated that you were not a candidate for surgery. You were also placed on a pain management and saw the physcial therapist while inpatient.  Please continue PT and follow up with PCP in 1 week

## 2023-04-13 NOTE — DISCHARGE NOTE PROVIDER - CARE PROVIDER_API CALL
JOAQUIN SLOAN  Internal Medicine  [address], [place] [address]  Phone: [phone]  Fax: ()-  Follow Up Time: 1-3 days    Francisco Mcnamara)  Orthopaedic Surgery  [facility] [place] [address]  Phone: [phone]  Fax: [phone]  Follow Up Time: 1 week

## 2023-04-13 NOTE — ED ADULT NURSE NOTE - CAS EDP DISCH TYPE
Home Same Histology In Subsequent Stages Text: The pattern and morphology of the tumor is as described in the first stage.

## 2023-04-13 NOTE — DISCHARGE NOTE PROVIDER - NSDCFUSCHEDAPPT_GEN_ALL_CORE_FT
Jacoby Cooper  [facility]  [address]  Scheduled Appointment: 04/24/2023

## 2023-04-13 NOTE — DISCHARGE NOTE PROVIDER - DETAILS OF MALNUTRITION DIAGNOSIS/DIAGNOSES
This patient has been assessed with a concern for Malnutrition and was treated during this hospitalization for the following Nutrition diagnosis/diagnoses:     -  04/11/2023: Underweight (BMI < 19)

## 2023-04-13 NOTE — CONSULT NOTE ADULT - ASSESSMENT
IMPRESSION: Rehab of 73 y.o  f  rehab  for  pelvic  fx  sp  fall  gd     PRECAUTIONS: [  ] Cardiac  [  ] Respiratory  [  ] Seizures [  ] Contact Isolation  [  ] Droplet Isolation  [ FALL ] Other    Weight Bearing Status: wbat  christianne  le      RECOMMENDATION:    Out of Bed to Chair     DVT/Decubiti Prophylaxis    REHAB PLAN:     [ xx  ] Bedside P/T 3-5 times a week   [   ]   Bedside O/T  2-3 times a week             [   ] No Rehab Therapy Indicated                   [   ]  Speech Therapy   Conditioning/ROM                                    ADL  Bed Mobility                                               Conditioning/ROM  Transfers                                                     Bed Mobility  Sitting /Standing Balance                         Transfers                                        Gait Training                                               Sitting/Standing Balance  Stair Training [   ]Applicable                    Home equipment Eval                                                                        Splinting  [   ] Only      GOALS:   ADL   [  x ]   Independent                    Transfers  [  x ] Independent                          Ambulation  [ x  ] Independent     [ x   ] With device                            [ x  ]  CG                                                         [   x]  CG                                                                  [  x ] CG                            [    ] Min A                                                   [   ] Min A                                                              [   ] Min  A          DISCHARGE PLAN:   [   ]  Good candidate for Intensive Rehabilitation/Hospital based-4A [facility]                                             Will tolerate 3hrs Intensive Rehab Daily                                       [ xx   ]  Short Term Rehab in Skilled Nursing Facility ptn agree with rehab plan  ,pain med  and  cont currant care                                       [    ]  Home with Outpatient or VN services                                         [    ]  Possible Candidate for Intensive Hospital based Rehab

## 2023-04-13 NOTE — CONSULT NOTE ADULT - SUBJECTIVE AND OBJECTIVE BOX
HPI: 72 y/o F PMHx clotting disorder (on Eliquis),s/p IV filter in 2013, hypothyroidism, COPD, HTN, HLD presented to  ED for 5 day h/o L hip pain s/p mechanical fall and decreased ambulation 2/2 pain.  Pt denies LOC, chest pain, n/v, fever, headache, dizziness, visual changes. numbness/ tingling of extremities.  Pt reports h/o L pelvic fx in 2018, tx'ed w/ conservative management      Patient reports that she slide down, and fell on her buttocks.  denies any CP, Neck pain, back pain, Numbness, weakness, LOC, headache, blurry vision, or cough  ptn seen at  bed  side  c/o  lt hip and  leg pain 6/10  on pain scale ptn lab , imaging ,medical orthopedic and  rehab  notes  is appreciated  and  reviewed .    PTN  REFERRED TO ACUTE  REHAB  FOR  EVAL AND  TX   PAST MEDICAL & SURGICAL HISTORY:  Atrial fibrillation      Hypothyroid      HTN (hypertension)      Hyperlipidemia      History of blood clotting disorder      S/P IVC filter          Hospital Course:    TODAY'S SUBJECTIVE & REVIEW OF SYMPTOMS:     Constitutional WNL   Cardio WNL   Resp WNL   GI WNL  Heme WNL  Endo WNL  Skin WNL  MSK WNL  Neuro WNL  Cognitive WNL  Psych WNL      MEDICATIONS  (STANDING):  atorvastatin 10 milliGRAM(s) Oral at bedtime  bisacodyl 5 milliGRAM(s) Oral at bedtime  chlorhexidine 2% Cloths 1 Application(s) Topical <User Schedule>  cyanocobalamin 1000 MICROGram(s) Oral daily  folic acid 1 milliGRAM(s) Oral daily  levothyroxine 50 MICROGram(s) Oral <User Schedule>  levothyroxine 75 MICROGram(s) Oral <User Schedule>  lisinopril 10 milliGRAM(s) Oral daily  metoprolol succinate ER 50 milliGRAM(s) Oral daily  oxycodone    5 mG/acetaminophen 325 mG 2 Tablet(s) Oral every 6 hours  polyethylene glycol 3350 17 Gram(s) Oral daily  rivaroxaban 15 milliGRAM(s) Oral with dinner  senna 2 Tablet(s) Oral at bedtime  tiotropium 2.5 MICROgram(s) Inhaler 2 Puff(s) Inhalation daily  wixela(fluticasone&amp;salmetrol powder) 1 Inhalation 1 Inhalation Inhalation two times a day    MEDICATIONS  (PRN):  acetaminophen     Tablet .. 650 milliGRAM(s) Oral every 6 hours PRN Temp greater or equal to 38C (100.4F), Mild Pain (1 - 3)  aluminum hydroxide/magnesium hydroxide/simethicone Suspension 30 milliLiter(s) Oral every 4 hours PRN Dyspepsia  melatonin 3 milliGRAM(s) Oral at bedtime PRN Insomnia  morphine  - Injectable 2 milliGRAM(s) IV Push every 4 hours PRN Severe Pain (7 - 10)  ondansetron Injectable 4 milliGRAM(s) IV Push every 8 hours PRN Nausea and/or Vomiting      FAMILY HISTORY:      Allergies    No Known Allergies    Intolerances        SOCIAL HISTORY:    [  ] Etoh  [  ] Smoking  [  ] Substance abuse     Home Environment:  [  ] Home Alone  [x  ] Lives with Family   [  ] Home Health Aid    Dwelling:  [  ] Apartment  [ x ] Private House 2 steps out side   [  ] Adult Home  [  ] Skilled Nursing Facility      [  ] Short Term  [  ] Long Term  [ x ] Stairs   2steps     Elevator [  ]    FUNCTIONAL STATUS PTA: (Check all that apply)  Ambulation: [  x ]Independent    [  ] Dependent     [  ] Non-Ambulatory  Assistive Device: [  ] SA Cane  [  ]  Q Cane  [  ] Walker  [  ]  Wheelchair  ADL : [  x] Independent  [  ]  Dependent       Vital Signs Last 24 Hrs  T(C): 35.9 (13 Apr 2023 05:02), Max: 35.9 (13 Apr 2023 05:02)  T(F): 96.7 (13 Apr 2023 05:02), Max: 96.7 (13 Apr 2023 05:02)  HR: 60 (13 Apr 2023 06:00) (60 - 80)  BP: 160/78 (13 Apr 2023 06:00) (149/76 - 176/83)  BP(mean): --  RR: 16 (13 Apr 2023 05:02) (16 - 18)  SpO2: 93% (13 Apr 2023 05:02) (93% - 97%)    Parameters below as of 13 Apr 2023 05:02  Patient On (Oxygen Delivery Method): room air          PHYSICAL EXAM: Alert & Oriented X3  GENERAL: NAD, well-groomed, well-developed  HEAD:  Atraumatic, Normocephalic  EYES: EOMI, PERRLA, conjunctiva and sclera clear  NECK: Supple, No JVD, Normal thyroid  CHEST/LUNG: Clear to percussion bilaterally; No rales, rhonchi, wheezing, or rubs  HEART: Regular rate and rhythm; No murmurs, rubs, or gallops  ABDOMEN: Soft, Nontender, Nondistended; Bowel sounds present  EXTREMITIES:  2+ Peripheral Pulses, No clubbing, cyanosis, or edema    NERVOUS SYSTEM:  Cranial Nerves 2-12 intact [x  ] Abnormal  [  ]  ROM: WFL all extremities [ x ]  Abnormal [  ]  Motor Strength: WFL all extremities  [x  ]  Abnormal [  ]  Sensation: intact to light touch [ x ] Abnormal [  ]  Reflexes: Symmetric [ x ]  Abnormal [  ]    FUNCTIONAL STATUS:  Bed Mobility: Independent [  ]  Supervision [  ]  Needs Assistance [  ]  N/A [  ]  Transfers: Independent [  ]  Supervision [  ]  Needs Assistance [  ]  N/A [ x ]   Ambulation: Independent [  ]  Supervision [  ]  Needs Assistance [  ]  N/A [ x ]  ADL: Independent [  ] Requires Assistance [  ] N/A [ x ]  SEE PT/OT IE NOTES    LABS:        RADIOLOGY & ADDITIONAL STUDIES:< from: Xray Hip 1 View, Left (04.10.23 @ 14:00) >  Very technically limited study. Portions of the femurs obscured. No   definitive evidence of acute displaced fracture. Chronic appearing   fracture deformity of the left inferior pubic ramus. Bony   demineralization. Partially imaged IVC filter. Degenerative changes of   the spine. Vascular calcifications.      < end of copied text >      Assesment:
pt had a fall   had pain left leg    having difficulty ambulating came to ED admitted    reporting pain left buttock to ankle    xray  ct scan  left sided acute/chronic pelvic fracture    PE  pt is lying in bed   has pain with motion and rolling     able to flex and extend toes      A/P  pt has pelvic fx that is acute/chronic    however her pain with leg pain to ankle seems more like sciatica     would investigate this as well    she should have rehab as well     no ortho surgery is needed

## 2023-04-13 NOTE — DISCHARGE NOTE PROVIDER - NSDCCAREPROVSEEN_GEN_ALL_CORE_FT
Garland Montaño Danyel, Garland Montaño, Garland Souza, Agnes Bullard, Antoinette Madden, Cherelle Mcnamara, Francisco Moss, Prasad Meadows, Georgi Rizzo, Dandre Dumas, Buddy
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
## 2023-04-13 NOTE — DISCHARGE NOTE PROVIDER - NSDCMRMEDTOKEN_GEN_ALL_CORE_FT
fluticasone-salmeterol 500 mcg-50 mcg inhalation powder: 1 inhaled  folic acid 1 mg oral tablet: 1 orally  levothyroxine 50 mcg (0.05 mg) oral capsule: 1 orally T TH S DASILVA  Lipitor 10 mg oral tablet: 1 orally  METOPROL TAR 50MG TAB:   Spiriva 18 mcg inhalation capsule: 1 inhaled  Synthroid 75 mcg (0.075 mg) oral tablet: 1 orally M W F  Vitamin B12 1000 mcg oral tablet: 1 tab(s) orally once a day  XARELTO 20MG TAB:   Zestril 10 mg oral tablet: 1 orally   fluticasone-salmeterol 500 mcg-50 mcg inhalation powder: 1 powder inhaled once a day  folic acid 1 mg oral tablet: 1 tablet orally once a day  levothyroxine 50 mcg (0.05 mg) oral capsule: 1 orally T TH S SU  Lipitor 10 mg oral tablet: 1 tablet orally once a day (at bedtime)  METOPROL TAR 50MG TAB:   Spiriva 18 mcg inhalation capsule: 1 capsule inhaled once a day  Synthroid 75 mcg (0.075 mg) oral tablet: 1 orally M W F  Vitamin B12 1000 mcg oral tablet: 1 tab(s) orally once a day  XARELTO 20MG TAB: With dinner  Zestril 10 mg oral tablet: 1 tablet orally once a day   fluticasone-salmeterol 500 mcg-50 mcg inhalation powder: 1 powder inhaled once a day  folic acid 1 mg oral tablet: 1 tablet orally once a day  levothyroxine 50 mcg (0.05 mg) oral capsule: 1 capsule orally Tuesday, Thursday, Saturday T TH S DASILVA  Lipitor 10 mg oral tablet: 1 tablet orally once a day (at bedtime)  METOPROL TAR 50MG TAB: 1 cap(s) orally every other day  oxycodone-acetaminophen 5 mg-325 mg oral tablet: 2 tab(s) orally every 6 hours As needed Severe Pain (7 - 10)  Spiriva 18 mcg inhalation capsule: 1 capsule inhaled once a day  Synthroid 75 mcg (0.075 mg) oral tablet: 1 tablet orally Monday, Wednesday, and Friday M W F  Vitamin B12 1000 mcg oral tablet: 1 tab(s) orally once a day  XARELTO 20MG TAB: With dinner  Zestril 10 mg oral tablet: 1 tablet orally every other day

## 2023-04-13 NOTE — DISCHARGE NOTE PROVIDER - PROVIDER TOKENS
PROVIDER:[TOKEN:[[id number]],FOLLOWUP:[1-3 days]],PROVIDER:[TOKEN:[[id number]],FOLLOWUP:[1 week]]

## 2023-04-13 NOTE — DISCHARGE NOTE PROVIDER - HOSPITAL COURSE
72 y/o female with PMH of DVT on eliquis s/p IVC filter, hypothyroidism, COPD not on home O2, HTN, HLD who is s/p with 5d of left hip pain s/p trip and fall and presents with increased difficulty with ambulation due to pain. Denies head trauma, LOC, chest pain, SOB, nausea/vomiting, headache, visual changes, dizziness, or lower/upper extremity numbness or weakness. She has h/o of left pelvic fracture in 2018 treated non-operatively. Pt ws treated w/ conservative management. In ED, imaging demonstrated L inferior pubic ramus fx and L anterior superior pubic ramus fx, noted to be appearing acute on chronic fractures. No evidence of thoracic, abdominal or pelvic visceral injury, laceration, or hematoma. Head Ct scan was unremarkable. Pt was placed on a pain control regiment and ortho was consulted while inpatient. Ortho stated that she was not a candidate for surgery. PT also came to see her while inpatient and suggested the patient be d/c to a short term rehab to regain strength.         74 y/o female with PMH of DVT on eliquis s/p IVC filter, hypothyroidism, COPD not on home O2, HTN, HLD who is s/p with 5d of left hip pain s/p trip and fall and presents with increased difficulty with ambulation due to pain. Denies head trauma, LOC, chest pain, SOB, nausea/vomiting, headache, visual changes, dizziness, or lower/upper extremity numbness or weakness. She has h/o of left pelvic fracture in 2018 treated non-operatively. Pt ws treated w/ conservative management.   In ED, imaging demonstrated L inferior pubic ramus fx and L anterior superior pubic ramus fx, noted to be appearing acute on chronic fractures. No evidence of thoracic, abdominal or pelvic visceral injury, laceration, or hematoma. Head Ct scan was unremarkable. Pt was placed on a pain control regiment and ortho was consulted while inpatient. Ortho stated that she was not a candidate for surgery. PT also came to see her while inpatient and suggested the patient be d/c to a short term rehab to regain strength.    Pt is medically stable for a dc to STR

## 2023-04-14 ENCOUNTER — TRANSCRIPTION ENCOUNTER (OUTPATIENT)
Age: 73
End: 2023-04-14

## 2023-04-14 VITALS
RESPIRATION RATE: 18 BRPM | DIASTOLIC BLOOD PRESSURE: 77 MMHG | HEART RATE: 81 BPM | TEMPERATURE: 96 F | SYSTOLIC BLOOD PRESSURE: 171 MMHG

## 2023-04-14 LAB — SARS-COV-2 RNA SPEC QL NAA+PROBE: SIGNIFICANT CHANGE UP

## 2023-04-14 PROCEDURE — 99239 HOSP IP/OBS DSCHRG MGMT >30: CPT

## 2023-04-14 RX ORDER — LEVOTHYROXINE SODIUM 125 MCG
1 TABLET ORAL
Qty: 0 | Refills: 0 | DISCHARGE

## 2023-04-14 RX ORDER — RIVAROXABAN 15 MG-20MG
1 KIT ORAL
Qty: 0 | Refills: 0 | DISCHARGE

## 2023-04-14 RX ORDER — OXYCODONE AND ACETAMINOPHEN 5; 325 MG/1; MG/1
2 TABLET ORAL
Qty: 0 | Refills: 0 | DISCHARGE
Start: 2023-04-14

## 2023-04-14 RX ORDER — FOLIC ACID 0.8 MG
1 TABLET ORAL
Qty: 0 | Refills: 0 | DISCHARGE

## 2023-04-14 RX ORDER — HYDRALAZINE HCL 50 MG
25 TABLET ORAL THREE TIMES A DAY
Refills: 0 | Status: DISCONTINUED | OUTPATIENT
Start: 2023-04-14 | End: 2023-04-14

## 2023-04-14 RX ORDER — FLUTICASONE PROPIONATE AND SALMETEROL 50; 250 UG/1; UG/1
1 POWDER ORAL; RESPIRATORY (INHALATION)
Qty: 0 | Refills: 0 | DISCHARGE

## 2023-04-14 RX ORDER — ATORVASTATIN CALCIUM 80 MG/1
1 TABLET, FILM COATED ORAL
Qty: 0 | Refills: 0 | DISCHARGE

## 2023-04-14 RX ADMIN — Medication 75 MICROGRAM(S): at 05:38

## 2023-04-14 RX ADMIN — Medication 25 MILLIGRAM(S): at 12:40

## 2023-04-14 RX ADMIN — TIOTROPIUM BROMIDE 2 PUFF(S): 18 CAPSULE ORAL; RESPIRATORY (INHALATION) at 10:09

## 2023-04-14 RX ADMIN — POLYETHYLENE GLYCOL 3350 17 GRAM(S): 17 POWDER, FOR SOLUTION ORAL at 11:27

## 2023-04-14 RX ADMIN — MORPHINE SULFATE 2 MILLIGRAM(S): 50 CAPSULE, EXTENDED RELEASE ORAL at 02:56

## 2023-04-14 RX ADMIN — PREGABALIN 1000 MICROGRAM(S): 225 CAPSULE ORAL at 11:28

## 2023-04-14 RX ADMIN — MORPHINE SULFATE 2 MILLIGRAM(S): 50 CAPSULE, EXTENDED RELEASE ORAL at 03:53

## 2023-04-14 NOTE — PROGRESS NOTE ADULT - SUBJECTIVE AND OBJECTIVE BOX
PROGRESS NOTE:   Garland Montaño MD  Available on teams    Patient is a 73y old  Female who presents with a chief complaint of Other specified fracture of unspecified pubis, initial encounter for closed fracture     (11 Apr 2023 15:04)      SUBJECTIVE / OVERNIGHT EVENTS:  Seen for follow up for ramus fracture  Reports pain on left buttocks  Denies fever, chills, fatigue, chest pain, shortness of breath, abdominal pain, nausea/ vomiting or diarrhea    ADDITIONAL REVIEW OF SYSTEMS:    MEDICATIONS  (STANDING):  atorvastatin 10 milliGRAM(s) Oral at bedtime  chlorhexidine 2% Cloths 1 Application(s) Topical <User Schedule>  cyanocobalamin 1000 MICROGram(s) Oral daily  folic acid 1 milliGRAM(s) Oral daily  levothyroxine 50 MICROGram(s) Oral <User Schedule>  levothyroxine 75 MICROGram(s) Oral <User Schedule>  lisinopril 10 milliGRAM(s) Oral daily  metoprolol succinate ER 50 milliGRAM(s) Oral daily  polyethylene glycol 3350 17 Gram(s) Oral daily  rivaroxaban 15 milliGRAM(s) Oral with dinner  tiotropium 2.5 MICROgram(s) Inhaler 2 Puff(s) Inhalation daily  wixela(fluticasone&amp;salmetrol powder) 1 Inhalation 1 Inhalation Inhalation two times a day    MEDICATIONS  (PRN):  acetaminophen     Tablet .. 650 milliGRAM(s) Oral every 6 hours PRN Temp greater or equal to 38C (100.4F), Mild Pain (1 - 3)  aluminum hydroxide/magnesium hydroxide/simethicone Suspension 30 milliLiter(s) Oral every 4 hours PRN Dyspepsia  melatonin 3 milliGRAM(s) Oral at bedtime PRN Insomnia  morphine  - Injectable 2 milliGRAM(s) IV Push every 4 hours PRN Severe Pain (7 - 10)  ondansetron Injectable 4 milliGRAM(s) IV Push every 8 hours PRN Nausea and/or Vomiting  oxycodone    5 mG/acetaminophen 325 mG 2 Tablet(s) Oral every 6 hours PRN Moderate Pain (4 - 6)      CAPILLARY BLOOD GLUCOSE        I&O's Summary    11 Apr 2023 07:01  -  12 Apr 2023 07:00  --------------------------------------------------------  IN: 0 mL / OUT: 200 mL / NET: -200 mL    12 Apr 2023 07:01  -  12 Apr 2023 12:33  --------------------------------------------------------  IN: 0 mL / OUT: 200 mL / NET: -200 mL        PHYSICAL EXAM:  Vital Signs Last 24 Hrs  T(C): 35.9 (13 Apr 2023 05:02), Max: 35.9 (13 Apr 2023 05:02)  T(F): 96.7 (13 Apr 2023 05:02), Max: 96.7 (13 Apr 2023 05:02)  HR: 60 (13 Apr 2023 06:00) (60 - 80)  BP: 160/78 (13 Apr 2023 06:00) (149/76 - 176/83)  BP(mean): --  RR: 16 (13 Apr 2023 05:02) (16 - 18)  SpO2: 93% (13 Apr 2023 05:02) (93% - 97%)    Parameters below as of 13 Apr 2023 05:02  Patient On (Oxygen Delivery Method): room air        GENERAL: No acute distress, well-developed  HEAD:  Atraumatic, Normocephalic  EYES: EOMI, PERRLA, conjunctiva and sclera clear  NECK: Supple, no lymphadenopathy, no JVD  CHEST/LUNG: CTAB; No wheezes, rales, or rhonchi  HEART: Regular rate and rhythm; No murmurs, rubs, or gallops  ABDOMEN: Soft, non-tender, non-distended; normal bowel sounds, no organomegaly  EXTREMITIES:  2+ peripheral pulses b/l, No clubbing, cyanosis, or edema  NEUROLOGY: A&O x 3, no focal deficits  SKIN: No rashes or lesions    LABS:                        9.8    4.79  )-----------( 207      ( 11 Apr 2023 06:35 )             29.2     04-11    141  |  103  |  18  ----------------------------<  77  3.8   |  23  |  0.7    Ca    9.1      11 Apr 2023 06:35    TPro  5.5<L>  /  Alb  3.5  /  TBili  1.0  /  DBili  x   /  AST  41  /  ALT  16  /  AlkPhos  109  04-10    PT/INR - ( 11 Apr 2023 06:35 )   PT: 13.30 sec;   INR: 1.16 ratio         PTT - ( 11 Apr 2023 06:35 )  PTT:32.9 sec            RADIOLOGY & ADDITIONAL TESTS:  Results Reviewed:   Imaging Personally Reviewed:  Electrocardiogram Personally Reviewed:    COORDINATION OF CARE:  Care Discussed with Consultants/Other Providers [Y/N]:  Prior or Outpatient Records Reviewed [Y/N]:  
CC.  Pelvic pain  HPI.  Patient reports pain is controlled.  Offers no other complaints               Constitutional: No fever, fatigue or weight loss.  Skin: No rash.  Eyes: No recent vision problems or eye pain.  ENT: No congestion, ear pain, or sore throat.  Endocrine: No thyroid problems.  Cardiovascular: No chest pain or palpation.  Respiratory: No cough, shortness of breath, congestion, or wheezing.  Gastrointestinal: No abdominal pain, nausea, vomiting, or diarrhea.  Genitourinary: No dysuria.  Musculoskeletal: No joint swelling.  Neurologic: No headache.      Vital Signs Last 24 Hrs  T(C): 35.7 (04-11-23 @ 05:00), Max: 35.8 (04-10-23 @ 13:24)  T(F): 96.3 (04-11-23 @ 05:00), Max: 96.5 (04-10-23 @ 13:24)  HR: 62 (04-11-23 @ 06:18) (62 - 93)  BP: 149/65 (04-11-23 @ 06:18) (149/65 - 202/96)  BP(mean): --  RR: 20 (04-11-23 @ 05:00) (18 - 20)  SpO2: 97% (04-11-23 @ 05:00) (92% - 99%)        PHYSICAL EXAM-  GENERAL: NAD,    HEAD:  Atraumatic, Normocephalic  EYES: EOMI, PERRLA, conjunctiva and sclera clear  NECK: Supple, No JVD, Normal thyroid  NERVOUS SYSTEM:  Alert & Oriented X3, Moving all extremities  CHEST/LUNG: Clear to percussion bilaterally; No rales, rhonchi, wheezing, or rubs  HEART: Regular rate and rhythm; No murmurs, rubs, or gallops  ABDOMEN: Soft, Nontender, Nondistended; Bowel sounds present  EXTREMITIES:   No clubbing, cyanosis, or edema  SKIN: No rashes or lesions                                  9.8    4.79  )-----------( 207      ( 11 Apr 2023 06:35 )             29.2     04-11    141  |  103  |  18  ----------------------------<  77  3.8   |  23  |  0.7    Ca    9.1      11 Apr 2023 06:35    TPro  5.5<L>  /  Alb  3.5  /  TBili  1.0  /  DBili  x   /  AST  41  /  ALT  16  /  AlkPhos  109  04-10            PT/INR - ( 11 Apr 2023 06:35 )   PT: 13.30 sec;   INR: 1.16 ratio         PTT - ( 11 Apr 2023 06:35 )  PTT:32.9 sec        MEDICATIONS  (STANDING):  atorvastatin 10 milliGRAM(s) Oral at bedtime  budesonide 160 MICROgram(s)/formoterol 4.5 MICROgram(s) Inhaler 1 Puff(s) Inhalation two times a day  chlorhexidine 2% Cloths 1 Application(s) Topical <User Schedule>  cyanocobalamin 1000 MICROGram(s) Oral daily  folic acid 1 milliGRAM(s) Oral daily  levothyroxine 50 MICROGram(s) Oral <User Schedule>  levothyroxine 75 MICROGram(s) Oral <User Schedule>  lisinopril 10 milliGRAM(s) Oral daily  metoprolol succinate ER 50 milliGRAM(s) Oral daily  polyethylene glycol 3350 17 Gram(s) Oral daily  rivaroxaban 15 milliGRAM(s) Oral with dinner  tiotropium 2.5 MICROgram(s) Inhaler 2 Puff(s) Inhalation daily    MEDICATIONS  (PRN):  acetaminophen     Tablet .. 650 milliGRAM(s) Oral every 6 hours PRN Temp greater or equal to 38C (100.4F), Mild Pain (1 - 3)  aluminum hydroxide/magnesium hydroxide/simethicone Suspension 30 milliLiter(s) Oral every 4 hours PRN Dyspepsia  melatonin 3 milliGRAM(s) Oral at bedtime PRN Insomnia  morphine  - Injectable 2 milliGRAM(s) IV Push every 4 hours PRN Severe Pain (7 - 10)  ondansetron Injectable 4 milliGRAM(s) IV Push every 8 hours PRN Nausea and/or Vomiting  oxycodone    5 mG/acetaminophen 325 mG 2 Tablet(s) Oral every 6 hours PRN Moderate Pain (4 - 6)      Imaging Personally Reviewed:     [x ] YES  [ ] NO    Consultant(s) Notes Reviewed:  [x ] YES  [ ] NO    Care Discussed with Consultants/Other Providers [x ] YES  [ ] No medical contraindication for discharge  
PROGRESS NOTE:   Garland Montaño MD  Available on teams    Patient is a 73y old  Female who presents with a chief complaint of Other specified fracture of unspecified pubis, initial encounter for closed fracture     (11 Apr 2023 15:04)      SUBJECTIVE / OVERNIGHT EVENTS:  Seen for follow up for ramus fracture  Reports pain on left buttocks  Denies any numbness/tinlging   Denies fever, chills, fatigue, chest pain, shortness of breath, abdominal pain, nausea/ vomiting or diarrhea    ADDITIONAL REVIEW OF SYSTEMS:    MEDICATIONS  (STANDING):  atorvastatin 10 milliGRAM(s) Oral at bedtime  chlorhexidine 2% Cloths 1 Application(s) Topical <User Schedule>  cyanocobalamin 1000 MICROGram(s) Oral daily  folic acid 1 milliGRAM(s) Oral daily  levothyroxine 50 MICROGram(s) Oral <User Schedule>  levothyroxine 75 MICROGram(s) Oral <User Schedule>  lisinopril 10 milliGRAM(s) Oral daily  metoprolol succinate ER 50 milliGRAM(s) Oral daily  polyethylene glycol 3350 17 Gram(s) Oral daily  rivaroxaban 15 milliGRAM(s) Oral with dinner  tiotropium 2.5 MICROgram(s) Inhaler 2 Puff(s) Inhalation daily  wixela(fluticasone&amp;salmetrol powder) 1 Inhalation 1 Inhalation Inhalation two times a day    MEDICATIONS  (PRN):  acetaminophen     Tablet .. 650 milliGRAM(s) Oral every 6 hours PRN Temp greater or equal to 38C (100.4F), Mild Pain (1 - 3)  aluminum hydroxide/magnesium hydroxide/simethicone Suspension 30 milliLiter(s) Oral every 4 hours PRN Dyspepsia  melatonin 3 milliGRAM(s) Oral at bedtime PRN Insomnia  morphine  - Injectable 2 milliGRAM(s) IV Push every 4 hours PRN Severe Pain (7 - 10)  ondansetron Injectable 4 milliGRAM(s) IV Push every 8 hours PRN Nausea and/or Vomiting  oxycodone    5 mG/acetaminophen 325 mG 2 Tablet(s) Oral every 6 hours PRN Moderate Pain (4 - 6)      CAPILLARY BLOOD GLUCOSE        I&O's Summary    11 Apr 2023 07:01  -  12 Apr 2023 07:00  --------------------------------------------------------  IN: 0 mL / OUT: 200 mL / NET: -200 mL    12 Apr 2023 07:01  -  12 Apr 2023 12:33  --------------------------------------------------------  IN: 0 mL / OUT: 200 mL / NET: -200 mL        PHYSICAL EXAM:  Vital Signs Last 24 Hrs  T(C): 36.2 (12 Apr 2023 05:29), Max: 36.7 (11 Apr 2023 21:09)  T(F): 97.1 (12 Apr 2023 05:29), Max: 98.1 (11 Apr 2023 21:09)  HR: 66 (12 Apr 2023 05:29) (66 - 76)  BP: 183/79 (12 Apr 2023 05:29) (151/64 - 183/79)  BP(mean): --  RR: 16 (12 Apr 2023 05:29) (16 - 16)  SpO2: 97% (12 Apr 2023 05:29) (90% - 97%)    Parameters below as of 12 Apr 2023 05:29  Patient On (Oxygen Delivery Method): room air        GENERAL: No acute distress, well-developed  HEAD:  Atraumatic, Normocephalic  EYES: EOMI, PERRLA, conjunctiva and sclera clear  NECK: Supple, no lymphadenopathy, no JVD  CHEST/LUNG: CTAB; No wheezes, rales, or rhonchi  HEART: Regular rate and rhythm; No murmurs, rubs, or gallops  ABDOMEN: Soft, non-tender, non-distended; normal bowel sounds, no organomegaly  EXTREMITIES:  2+ peripheral pulses b/l, No clubbing, cyanosis, or edema  NEUROLOGY: A&O x 3, no focal deficits  SKIN: No rashes or lesions    LABS:                        9.8    4.79  )-----------( 207      ( 11 Apr 2023 06:35 )             29.2     04-11    141  |  103  |  18  ----------------------------<  77  3.8   |  23  |  0.7    Ca    9.1      11 Apr 2023 06:35    TPro  5.5<L>  /  Alb  3.5  /  TBili  1.0  /  DBili  x   /  AST  41  /  ALT  16  /  AlkPhos  109  04-10    PT/INR - ( 11 Apr 2023 06:35 )   PT: 13.30 sec;   INR: 1.16 ratio         PTT - ( 11 Apr 2023 06:35 )  PTT:32.9 sec            RADIOLOGY & ADDITIONAL TESTS:  Results Reviewed:   Imaging Personally Reviewed:  Electrocardiogram Personally Reviewed:    COORDINATION OF CARE:  Care Discussed with Consultants/Other Providers [Y/N]:  Prior or Outpatient Records Reviewed [Y/N]:  
PROGRESS NOTE:   Garland Montaño MD  Available on teams    Patient is a 73y old  Female who presents with a chief complaint of Other specified fracture of unspecified pubis, initial encounter for closed fracture     (11 Apr 2023 15:04)      SUBJECTIVE / OVERNIGHT EVENTS:  Seen for follow up for ramus fracture  Reports pain on left buttocks  Denies fever, chills, fatigue, chest pain, shortness of breath, abdominal pain, nausea/ vomiting or diarrhea    ADDITIONAL REVIEW OF SYSTEMS:    MEDICATIONS  (STANDING):  atorvastatin 10 milliGRAM(s) Oral at bedtime  chlorhexidine 2% Cloths 1 Application(s) Topical <User Schedule>  cyanocobalamin 1000 MICROGram(s) Oral daily  folic acid 1 milliGRAM(s) Oral daily  levothyroxine 50 MICROGram(s) Oral <User Schedule>  levothyroxine 75 MICROGram(s) Oral <User Schedule>  lisinopril 10 milliGRAM(s) Oral daily  metoprolol succinate ER 50 milliGRAM(s) Oral daily  polyethylene glycol 3350 17 Gram(s) Oral daily  rivaroxaban 15 milliGRAM(s) Oral with dinner  tiotropium 2.5 MICROgram(s) Inhaler 2 Puff(s) Inhalation daily  wixela(fluticasone&amp;salmetrol powder) 1 Inhalation 1 Inhalation Inhalation two times a day    MEDICATIONS  (PRN):  acetaminophen     Tablet .. 650 milliGRAM(s) Oral every 6 hours PRN Temp greater or equal to 38C (100.4F), Mild Pain (1 - 3)  aluminum hydroxide/magnesium hydroxide/simethicone Suspension 30 milliLiter(s) Oral every 4 hours PRN Dyspepsia  melatonin 3 milliGRAM(s) Oral at bedtime PRN Insomnia  morphine  - Injectable 2 milliGRAM(s) IV Push every 4 hours PRN Severe Pain (7 - 10)  ondansetron Injectable 4 milliGRAM(s) IV Push every 8 hours PRN Nausea and/or Vomiting  oxycodone    5 mG/acetaminophen 325 mG 2 Tablet(s) Oral every 6 hours PRN Moderate Pain (4 - 6)      CAPILLARY BLOOD GLUCOSE        I&O's Summary    11 Apr 2023 07:01  -  12 Apr 2023 07:00  --------------------------------------------------------  IN: 0 mL / OUT: 200 mL / NET: -200 mL    12 Apr 2023 07:01  -  12 Apr 2023 12:33  --------------------------------------------------------  IN: 0 mL / OUT: 200 mL / NET: -200 mL        PHYSICAL EXAM:  Vital Signs Last 24 Hrs  T(C): 35.9 (14 Apr 2023 05:33), Max: 36.1 (13 Apr 2023 21:00)  T(F): 96.7 (14 Apr 2023 05:33), Max: 96.9 (13 Apr 2023 21:00)  HR: 71 (14 Apr 2023 05:33) (71 - 80)  BP: 159/75 (14 Apr 2023 05:33) (143/64 - 180/82)  BP(mean): --  RR: 17 (14 Apr 2023 05:33) (16 - 17)  SpO2: 96% (14 Apr 2023 05:33) (96% - 98%)    Parameters below as of 14 Apr 2023 05:33  Patient On (Oxygen Delivery Method): room air            GENERAL: No acute distress, well-developed  HEAD:  Atraumatic, Normocephalic  EYES: EOMI, PERRLA, conjunctiva and sclera clear  NECK: Supple, no lymphadenopathy, no JVD  CHEST/LUNG: CTAB; No wheezes, rales, or rhonchi  HEART: Regular rate and rhythm; No murmurs, rubs, or gallops  ABDOMEN: Soft, non-tender, non-distended; normal bowel sounds, no organomegaly  EXTREMITIES:  2+ peripheral pulses b/l, No clubbing, cyanosis, or edema  NEUROLOGY: A&O x 3, no focal deficits  SKIN: No rashes or lesions    LABS:                        9.8    4.79  )-----------( 207      ( 11 Apr 2023 06:35 )             29.2     04-11    141  |  103  |  18  ----------------------------<  77  3.8   |  23  |  0.7    Ca    9.1      11 Apr 2023 06:35    TPro  5.5<L>  /  Alb  3.5  /  TBili  1.0  /  DBili  x   /  AST  41  /  ALT  16  /  AlkPhos  109  04-10    PT/INR - ( 11 Apr 2023 06:35 )   PT: 13.30 sec;   INR: 1.16 ratio         PTT - ( 11 Apr 2023 06:35 )  PTT:32.9 sec            RADIOLOGY & ADDITIONAL TESTS:  Results Reviewed:   Imaging Personally Reviewed:  Electrocardiogram Personally Reviewed:    COORDINATION OF CARE:  Care Discussed with Consultants/Other Providers [Y/N]:  Prior or Outpatient Records Reviewed [Y/N]:

## 2023-04-14 NOTE — PROGRESS NOTE ADULT - ASSESSMENT
72 y/o F PMHx clotting disorder (on Eliquis),s/p IV filter in 2013, hypothyroidism, COPD, HTN, HLD presented to  ED for 5 day h/o L hip pain s/p fall and decreased ambulation 2/2 pain.  Pt denies LOC, chest pain, n/v, fever, headache, dizziness, visual changes. numbness/ tingling of extremities. Pt reports h/o L pelvic fx in 2018, tx'ed w/ conservative management. In ED,  imaging demonstrated L inferior pubic ramus fx and L anterior superior pubic ramus fx, noted to be appearing acute on chronic  fxs. No evidence of thoracic, abdominal or pelvic visceral injury, laceration, or hematoma.    L ramus  fx/fall   low suspicion for sciatica  abdm/pelvic/chest CT scan noticed  Head CT scan shows no acute process   - ortho recs appreciated  -pain control  -PT    h/o clotting disorder   -c/w anticoagulation    HTN  -c/w  metoprolol and lisinopril    Hypothyroidism  -c/w synthroid MWF and levothyroxine T, TH, S, Sun    HLD  - c/w statin    COPD  c/w Advair and Spiriva    VTE:  home Xarelto     Dispo: DC to [facility]    
74 y/o F PMHx clotting disorder (on Eliquis),s/p IV filter in 2013, hypothyroidism, COPD, HTN, HLD presented to  ED for 5 day h/o L hip pain s/p fall and decreased ambulation 2/2 pain.  Pt denies LOC, chest pain, n/v, fever, headache, dizziness, visual changes. numbness/ tingling of extremities. Pt reports h/o L pelvic fx in 2018, tx'ed w/ conservative management. In ED,  imaging demonstrated L inferior pubic ramus fx and L anterior superior pubic ramus fx, noted to be appearing acute on chronic  fxs. No evidence of thoracic, abdominal or pelvic visceral injury, laceration, or hematoma.    # L ramus  fx/fall  -abdm/pelvic/chest CT scan noticed  -Head CT scan shows no acute process   - ortho consult  -pain control  -PT    # h/o clotting disorder   -c/w anticoagulation    # HTN  -c/w  metoprolol and lisinopril    # Hypothyroidism  -c/w synthroid MWF and levothyroxine T, TH, S, Sun    # HLD  - c/w statin    #COPD  c/w Advair and Spiriva    VTE:  home Xarelto   #Progress Note Handoff  Pending (specify):  as above   Family discussion:  plan of care was discussed with patient and family in details.  all questions were answered.  seems to understand, and in agreement  Disposition:  PT  
74 y/o F PMHx clotting disorder (on Eliquis),s/p IV filter in 2013, hypothyroidism, COPD, HTN, HLD presented to  ED for 5 day h/o L hip pain s/p fall and decreased ambulation 2/2 pain.  Pt denies LOC, chest pain, n/v, fever, headache, dizziness, visual changes. numbness/ tingling of extremities. Pt reports h/o L pelvic fx in 2018, tx'ed w/ conservative management. In ED,  imaging demonstrated L inferior pubic ramus fx and L anterior superior pubic ramus fx, noted to be appearing acute on chronic  fxs. No evidence of thoracic, abdominal or pelvic visceral injury, laceration, or hematoma.    L ramus  fx/fall   low suspicion for sciatica  abdm/pelvic/chest CT scan noticed  Head CT scan shows no acute process   - ortho recs appreciated  -pain control  -PT    h/o clotting disorder   -c/w anticoagulation    HTN  -c/w  metoprolol and lisinopril    Hypothyroidism  -c/w synthroid MWF and levothyroxine T, TH, S, Sun    HLD  - c/w statin    COPD  c/w Advair and Spiriva    VTE:  home Xarelto     
72 y/o F PMHx clotting disorder (on Eliquis),s/p IV filter in 2013, hypothyroidism, COPD, HTN, HLD presented to  ED for 5 day h/o L hip pain s/p fall and decreased ambulation 2/2 pain.  Pt denies LOC, chest pain, n/v, fever, headache, dizziness, visual changes. numbness/ tingling of extremities. Pt reports h/o L pelvic fx in 2018, tx'ed w/ conservative management. In ED,  imaging demonstrated L inferior pubic ramus fx and L anterior superior pubic ramus fx, noted to be appearing acute on chronic  fxs. No evidence of thoracic, abdominal or pelvic visceral injury, laceration, or hematoma.    L ramus  fx/fall   low suspicion for sciatica  abdm/pelvic/chest CT scan noticed  Head CT scan shows no acute process   - ortho recs appreciated  -pain control  -PT    h/o clotting disorder   -c/w anticoagulation    HTN  -c/w  metoprolol and lisinopril    Hypothyroidism  -c/w synthroid MWF and levothyroxine T, TH, S, Sun    HLD  - c/w statin    COPD  c/w Advair and Spiriva    VTE:  home Xarelto

## 2023-04-14 NOTE — PROGRESS NOTE ADULT - NUTRITIONAL ASSESSMENT
This patient has been assessed with a concern for Malnutrition and has been determined to have a diagnosis/diagnoses of Underweight (BMI < 19).    This patient is being managed with:   Diet DASH/TLC-  Sodium & Cholesterol Restricted  Supplement Feeding Modality:  Oral  Ensure Plus High Protein Cans or Servings Per Day:  1       Frequency:  Two Times a day  Entered: Apr 11 2023  3:20PM  

## 2023-04-14 NOTE — DISCHARGE NOTE NURSING/CASE MANAGEMENT/SOCIAL WORK - PATIENT PORTAL LINK FT
You can access the FollowMyHealth Patient Portal offered by [facility] by registering at the following website: [email or web address]. By joining [facility]’s FollowMyHealth portal, you will also be able to view your health information using other applications (apps) compatible with our system.

## 2023-04-14 NOTE — DISCHARGE NOTE NURSING/CASE MANAGEMENT/SOCIAL WORK - NSDCVIVACCINE_GEN_ALL_CORE_FT
Tdap; 17-Nov-2022 15:14; Isatu Marion (ANGELIQUE); [employer]; [id number] (Exp. Date: 08-Dec-2024); IntraMuscular; Deltoid Right.; 0.5 milliLiter(s); VIS (VIS Published: 09-May-2013, VIS Presented: 17-Nov-2022);

## 2023-04-18 DIAGNOSIS — J44.9 CHRONIC OBSTRUCTIVE PULMONARY DISEASE, UNSPECIFIED: ICD-10-CM

## 2023-04-18 DIAGNOSIS — E03.9 HYPOTHYROIDISM, UNSPECIFIED: ICD-10-CM

## 2023-04-18 DIAGNOSIS — R63.6 UNDERWEIGHT: ICD-10-CM

## 2023-04-18 DIAGNOSIS — Z20.822 CONTACT WITH AND (SUSPECTED) EXPOSURE TO COVID-19: ICD-10-CM

## 2023-04-18 DIAGNOSIS — Z95.828 PRESENCE OF OTHER VASCULAR IMPLANTS AND GRAFTS: ICD-10-CM

## 2023-04-18 DIAGNOSIS — S32.592A OTHER SPECIFIED FRACTURE OF LEFT PUBIS, INITIAL ENCOUNTER FOR CLOSED FRACTURE: ICD-10-CM
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
## 2023-04-18 DIAGNOSIS — E78.5 HYPERLIPIDEMIA, UNSPECIFIED: ICD-10-CM

## 2023-04-18 DIAGNOSIS — Z86.718 PERSONAL HISTORY OF OTHER VENOUS THROMBOSIS AND EMBOLISM: ICD-10-CM

## 2023-04-18 DIAGNOSIS — I48.91 UNSPECIFIED ATRIAL FIBRILLATION: ICD-10-CM

## 2023-04-18 DIAGNOSIS — Z79.890 HORMONE REPLACEMENT THERAPY: ICD-10-CM

## 2023-04-18 DIAGNOSIS — I10 ESSENTIAL (PRIMARY) HYPERTENSION: ICD-10-CM

## 2023-04-18 DIAGNOSIS — Z87.891 PERSONAL HISTORY OF NICOTINE DEPENDENCE: ICD-10-CM

## 2023-04-18 DIAGNOSIS — Z79.01 LONG TERM (CURRENT) USE OF ANTICOAGULANTS: ICD-10-CM

## 2023-04-19 DIAGNOSIS — S32.592A OTHER SPECIFIED FRACTURE OF LEFT PUBIS, INITIAL ENCOUNTER FOR CLOSED FRACTURE: ICD-10-CM

## 2023-04-20 ENCOUNTER — APPOINTMENT (OUTPATIENT)
Dept: ORTHOPEDIC SURGERY | Facility: ASSISTED LIVING FACILITY | Age: 73
End: 2023-04-20
Payer: MEDICARE

## 2023-04-20 PROBLEM — E03.9 HYPOTHYROIDISM, UNSPECIFIED: Chronic | Status: ACTIVE | Noted: 2023-04-10

## 2023-04-20 PROBLEM — I10 ESSENTIAL (PRIMARY) HYPERTENSION: Chronic | Status: ACTIVE | Noted: 2023-04-10

## 2023-04-20 PROBLEM — E78.5 HYPERLIPIDEMIA, UNSPECIFIED: Chronic | Status: ACTIVE | Noted: 2023-04-10

## 2023-04-20 PROBLEM — Z86.2 PERSONAL HISTORY OF DISEASES OF THE BLOOD AND BLOOD-FORMING ORGANS AND CERTAIN DISORDERS INVOLVING THE IMMUNE MECHANISM: Chronic | Status: ACTIVE | Noted: 2023-04-10

## 2023-04-20 PROCEDURE — 99307 SBSQ NF CARE SF MDM 10: CPT

## 2023-04-24 ENCOUNTER — APPOINTMENT (OUTPATIENT)
Dept: PULMONOLOGY | Facility: CLINIC | Age: 73
End: 2023-04-24

## 2023-05-24 ENCOUNTER — APPOINTMENT (OUTPATIENT)
Dept: PULMONOLOGY | Facility: CLINIC | Age: 73
End: 2023-05-24
Payer: MEDICARE

## 2023-05-24 VITALS
HEART RATE: 112 BPM | BODY MASS INDEX: 15.7 KG/M2 | SYSTOLIC BLOOD PRESSURE: 144 MMHG | OXYGEN SATURATION: 90 % | HEIGHT: 67 IN | DIASTOLIC BLOOD PRESSURE: 70 MMHG | WEIGHT: 100 LBS

## 2023-05-24 PROCEDURE — 99214 OFFICE O/P EST MOD 30 MIN: CPT

## 2023-05-24 RX ORDER — FLUTICASONE PROPIONATE AND SALMETEROL 500; 50 UG/1; UG/1
500-50 POWDER RESPIRATORY (INHALATION)
Qty: 3 | Refills: 3 | Status: COMPLETED | COMMUNITY
Start: 2022-12-27 | End: 2023-05-24

## 2023-05-24 NOTE — REASON FOR VISIT
[Follow-Up] : a follow-up visit [COPD] : COPD [Bronchiectasis] : bronchiectasis [TextBox_44] : Overall doing fairly well from a breathing standpoint.  No flareups or exacerbations.  She had an issue where she fractured her pelvis her sciatic nerve was involved.  She was in rehab.  While she was hospitalized she had a CT of her chest for part of her trauma work-up which failed to reveal any significant pathologies other than her chronic disease.  She quit smoking 16 years ago.

## 2023-05-24 NOTE — ASSESSMENT
[FreeTextEntry1] : Assessment:\par COPD \par  bronchiectasis\par Both stable\par \par plan:\par Stress compliance with inhalers. Renewed today.\par cont PRN albuterol\par cont ICS/LABA\par screening CT chest is no longer needed given the fact she quit smoking 16 years ago\par \par F/U 6 months\par

## 2023-10-24 ENCOUNTER — APPOINTMENT (OUTPATIENT)
Dept: FAMILY MEDICINE | Facility: CLINIC | Age: 73
End: 2023-10-24

## 2023-11-20 ENCOUNTER — APPOINTMENT (OUTPATIENT)
Dept: PULMONOLOGY | Facility: CLINIC | Age: 73
End: 2023-11-20
Payer: MEDICARE

## 2023-11-20 VITALS
BODY MASS INDEX: 15.38 KG/M2 | HEIGHT: 67 IN | OXYGEN SATURATION: 95 % | WEIGHT: 98 LBS | DIASTOLIC BLOOD PRESSURE: 80 MMHG | HEART RATE: 92 BPM | SYSTOLIC BLOOD PRESSURE: 124 MMHG

## 2023-11-20 PROCEDURE — 99214 OFFICE O/P EST MOD 30 MIN: CPT

## 2023-11-29 ENCOUNTER — INPATIENT (INPATIENT)
Facility: HOSPITAL | Age: 73
LOS: 4 days | Discharge: NURSING, REHAB OR LONG-TERM CARE | DRG: 964 | End: 2023-12-04
Attending: SURGERY | Admitting: SURGERY
Payer: MEDICARE

## 2023-11-29 VITALS
TEMPERATURE: 98 F | OXYGEN SATURATION: 96 % | HEIGHT: 66 IN | HEART RATE: 68 BPM | WEIGHT: 100.09 LBS | SYSTOLIC BLOOD PRESSURE: 113 MMHG | DIASTOLIC BLOOD PRESSURE: 57 MMHG | RESPIRATION RATE: 18 BRPM

## 2023-11-29 DIAGNOSIS — Z95.828 PRESENCE OF OTHER VASCULAR IMPLANTS AND GRAFTS: Chronic | ICD-10-CM

## 2023-11-29 LAB
ALBUMIN SERPL ELPH-MCNC: 3.6 G/DL — SIGNIFICANT CHANGE UP (ref 3.5–5.2)
ALBUMIN SERPL ELPH-MCNC: 3.6 G/DL — SIGNIFICANT CHANGE UP (ref 3.5–5.2)
ALP SERPL-CCNC: 58 U/L — SIGNIFICANT CHANGE UP (ref 30–115)
ALP SERPL-CCNC: 58 U/L — SIGNIFICANT CHANGE UP (ref 30–115)
ALT FLD-CCNC: 11 U/L — SIGNIFICANT CHANGE UP (ref 0–41)
ALT FLD-CCNC: 11 U/L — SIGNIFICANT CHANGE UP (ref 0–41)
ANION GAP SERPL CALC-SCNC: 12 MMOL/L — SIGNIFICANT CHANGE UP (ref 7–14)
ANION GAP SERPL CALC-SCNC: 12 MMOL/L — SIGNIFICANT CHANGE UP (ref 7–14)
APTT BLD: 43.4 SEC — HIGH (ref 27–39.2)
APTT BLD: 43.4 SEC — HIGH (ref 27–39.2)
AST SERPL-CCNC: 19 U/L — SIGNIFICANT CHANGE UP (ref 0–41)
AST SERPL-CCNC: 19 U/L — SIGNIFICANT CHANGE UP (ref 0–41)
BASOPHILS # BLD AUTO: 0.07 K/UL — SIGNIFICANT CHANGE UP (ref 0–0.2)
BASOPHILS # BLD AUTO: 0.07 K/UL — SIGNIFICANT CHANGE UP (ref 0–0.2)
BASOPHILS NFR BLD AUTO: 1.3 % — HIGH (ref 0–1)
BASOPHILS NFR BLD AUTO: 1.3 % — HIGH (ref 0–1)
BILIRUB SERPL-MCNC: 0.3 MG/DL — SIGNIFICANT CHANGE UP (ref 0.2–1.2)
BILIRUB SERPL-MCNC: 0.3 MG/DL — SIGNIFICANT CHANGE UP (ref 0.2–1.2)
BUN SERPL-MCNC: 19 MG/DL — SIGNIFICANT CHANGE UP (ref 10–20)
BUN SERPL-MCNC: 19 MG/DL — SIGNIFICANT CHANGE UP (ref 10–20)
CALCIUM SERPL-MCNC: 8.8 MG/DL — SIGNIFICANT CHANGE UP (ref 8.4–10.5)
CALCIUM SERPL-MCNC: 8.8 MG/DL — SIGNIFICANT CHANGE UP (ref 8.4–10.5)
CHLORIDE SERPL-SCNC: 104 MMOL/L — SIGNIFICANT CHANGE UP (ref 98–110)
CHLORIDE SERPL-SCNC: 104 MMOL/L — SIGNIFICANT CHANGE UP (ref 98–110)
CO2 SERPL-SCNC: 22 MMOL/L — SIGNIFICANT CHANGE UP (ref 17–32)
CO2 SERPL-SCNC: 22 MMOL/L — SIGNIFICANT CHANGE UP (ref 17–32)
CREAT SERPL-MCNC: 0.8 MG/DL — SIGNIFICANT CHANGE UP (ref 0.7–1.5)
CREAT SERPL-MCNC: 0.8 MG/DL — SIGNIFICANT CHANGE UP (ref 0.7–1.5)
EGFR: 78 ML/MIN/1.73M2 — SIGNIFICANT CHANGE UP
EGFR: 78 ML/MIN/1.73M2 — SIGNIFICANT CHANGE UP
EOSINOPHIL # BLD AUTO: 0.08 K/UL — SIGNIFICANT CHANGE UP (ref 0–0.7)
EOSINOPHIL # BLD AUTO: 0.08 K/UL — SIGNIFICANT CHANGE UP (ref 0–0.7)
EOSINOPHIL NFR BLD AUTO: 1.5 % — SIGNIFICANT CHANGE UP (ref 0–8)
EOSINOPHIL NFR BLD AUTO: 1.5 % — SIGNIFICANT CHANGE UP (ref 0–8)
GLUCOSE SERPL-MCNC: 99 MG/DL — SIGNIFICANT CHANGE UP (ref 70–99)
GLUCOSE SERPL-MCNC: 99 MG/DL — SIGNIFICANT CHANGE UP (ref 70–99)
HCT VFR BLD CALC: 28.2 % — LOW (ref 37–47)
HCT VFR BLD CALC: 28.2 % — LOW (ref 37–47)
HCT VFR BLD CALC: 32.2 % — LOW (ref 37–47)
HCT VFR BLD CALC: 32.2 % — LOW (ref 37–47)
HGB BLD-MCNC: 10.8 G/DL — LOW (ref 12–16)
HGB BLD-MCNC: 10.8 G/DL — LOW (ref 12–16)
HGB BLD-MCNC: 9.3 G/DL — LOW (ref 12–16)
HGB BLD-MCNC: 9.3 G/DL — LOW (ref 12–16)
IMM GRANULOCYTES NFR BLD AUTO: 0.8 % — HIGH (ref 0.1–0.3)
IMM GRANULOCYTES NFR BLD AUTO: 0.8 % — HIGH (ref 0.1–0.3)
INR BLD: 2.13 RATIO — HIGH (ref 0.65–1.3)
INR BLD: 2.13 RATIO — HIGH (ref 0.65–1.3)
LYMPHOCYTES # BLD AUTO: 1.28 K/UL — SIGNIFICANT CHANGE UP (ref 1.2–3.4)
LYMPHOCYTES # BLD AUTO: 1.28 K/UL — SIGNIFICANT CHANGE UP (ref 1.2–3.4)
LYMPHOCYTES # BLD AUTO: 24.6 % — SIGNIFICANT CHANGE UP (ref 20.5–51.1)
LYMPHOCYTES # BLD AUTO: 24.6 % — SIGNIFICANT CHANGE UP (ref 20.5–51.1)
MAGNESIUM SERPL-MCNC: 1.9 MG/DL — SIGNIFICANT CHANGE UP (ref 1.8–2.4)
MAGNESIUM SERPL-MCNC: 1.9 MG/DL — SIGNIFICANT CHANGE UP (ref 1.8–2.4)
MCHC RBC-ENTMCNC: 33 G/DL — SIGNIFICANT CHANGE UP (ref 32–37)
MCHC RBC-ENTMCNC: 33 G/DL — SIGNIFICANT CHANGE UP (ref 32–37)
MCHC RBC-ENTMCNC: 33.5 G/DL — SIGNIFICANT CHANGE UP (ref 32–37)
MCHC RBC-ENTMCNC: 33.5 G/DL — SIGNIFICANT CHANGE UP (ref 32–37)
MCHC RBC-ENTMCNC: 34.2 PG — HIGH (ref 27–31)
MCHC RBC-ENTMCNC: 34.2 PG — HIGH (ref 27–31)
MCHC RBC-ENTMCNC: 34.6 PG — HIGH (ref 27–31)
MCHC RBC-ENTMCNC: 34.6 PG — HIGH (ref 27–31)
MCV RBC AUTO: 103.2 FL — HIGH (ref 81–99)
MCV RBC AUTO: 103.2 FL — HIGH (ref 81–99)
MCV RBC AUTO: 103.7 FL — HIGH (ref 81–99)
MCV RBC AUTO: 103.7 FL — HIGH (ref 81–99)
MONOCYTES # BLD AUTO: 0.59 K/UL — SIGNIFICANT CHANGE UP (ref 0.1–0.6)
MONOCYTES # BLD AUTO: 0.59 K/UL — SIGNIFICANT CHANGE UP (ref 0.1–0.6)
MONOCYTES NFR BLD AUTO: 11.3 % — HIGH (ref 1.7–9.3)
MONOCYTES NFR BLD AUTO: 11.3 % — HIGH (ref 1.7–9.3)
NEUTROPHILS # BLD AUTO: 3.15 K/UL — SIGNIFICANT CHANGE UP (ref 1.4–6.5)
NEUTROPHILS # BLD AUTO: 3.15 K/UL — SIGNIFICANT CHANGE UP (ref 1.4–6.5)
NEUTROPHILS NFR BLD AUTO: 60.5 % — SIGNIFICANT CHANGE UP (ref 42.2–75.2)
NEUTROPHILS NFR BLD AUTO: 60.5 % — SIGNIFICANT CHANGE UP (ref 42.2–75.2)
NRBC # BLD: 0 /100 WBCS — SIGNIFICANT CHANGE UP (ref 0–0)
PLATELET # BLD AUTO: 358 K/UL — SIGNIFICANT CHANGE UP (ref 130–400)
PLATELET # BLD AUTO: 358 K/UL — SIGNIFICANT CHANGE UP (ref 130–400)
PLATELET # BLD AUTO: 413 K/UL — HIGH (ref 130–400)
PLATELET # BLD AUTO: 413 K/UL — HIGH (ref 130–400)
PMV BLD: 9.2 FL — SIGNIFICANT CHANGE UP (ref 7.4–10.4)
PMV BLD: 9.2 FL — SIGNIFICANT CHANGE UP (ref 7.4–10.4)
PMV BLD: 9.3 FL — SIGNIFICANT CHANGE UP (ref 7.4–10.4)
PMV BLD: 9.3 FL — SIGNIFICANT CHANGE UP (ref 7.4–10.4)
POTASSIUM SERPL-MCNC: 3.8 MMOL/L — SIGNIFICANT CHANGE UP (ref 3.5–5)
POTASSIUM SERPL-MCNC: 3.8 MMOL/L — SIGNIFICANT CHANGE UP (ref 3.5–5)
POTASSIUM SERPL-SCNC: 3.8 MMOL/L — SIGNIFICANT CHANGE UP (ref 3.5–5)
POTASSIUM SERPL-SCNC: 3.8 MMOL/L — SIGNIFICANT CHANGE UP (ref 3.5–5)
PROT SERPL-MCNC: 5.4 G/DL — LOW (ref 6–8)
PROT SERPL-MCNC: 5.4 G/DL — LOW (ref 6–8)
PROTHROM AB SERPL-ACNC: 24.5 SEC — HIGH (ref 9.95–12.87)
PROTHROM AB SERPL-ACNC: 24.5 SEC — HIGH (ref 9.95–12.87)
RBC # BLD: 2.72 M/UL — LOW (ref 4.2–5.4)
RBC # BLD: 2.72 M/UL — LOW (ref 4.2–5.4)
RBC # BLD: 3.12 M/UL — LOW (ref 4.2–5.4)
RBC # BLD: 3.12 M/UL — LOW (ref 4.2–5.4)
RBC # FLD: 15.7 % — HIGH (ref 11.5–14.5)
RBC # FLD: 15.7 % — HIGH (ref 11.5–14.5)
RBC # FLD: 15.9 % — HIGH (ref 11.5–14.5)
RBC # FLD: 15.9 % — HIGH (ref 11.5–14.5)
SODIUM SERPL-SCNC: 138 MMOL/L — SIGNIFICANT CHANGE UP (ref 135–146)
SODIUM SERPL-SCNC: 138 MMOL/L — SIGNIFICANT CHANGE UP (ref 135–146)
TROPONIN T SERPL-MCNC: <0.01 NG/ML — SIGNIFICANT CHANGE UP
TROPONIN T SERPL-MCNC: <0.01 NG/ML — SIGNIFICANT CHANGE UP
WBC # BLD: 11.38 K/UL — HIGH (ref 4.8–10.8)
WBC # BLD: 11.38 K/UL — HIGH (ref 4.8–10.8)
WBC # BLD: 5.21 K/UL — SIGNIFICANT CHANGE UP (ref 4.8–10.8)
WBC # BLD: 5.21 K/UL — SIGNIFICANT CHANGE UP (ref 4.8–10.8)
WBC # FLD AUTO: 11.38 K/UL — HIGH (ref 4.8–10.8)
WBC # FLD AUTO: 11.38 K/UL — HIGH (ref 4.8–10.8)
WBC # FLD AUTO: 5.21 K/UL — SIGNIFICANT CHANGE UP (ref 4.8–10.8)
WBC # FLD AUTO: 5.21 K/UL — SIGNIFICANT CHANGE UP (ref 4.8–10.8)

## 2023-11-29 PROCEDURE — 74177 CT ABD & PELVIS W/CONTRAST: CPT | Mod: 26,MA

## 2023-11-29 PROCEDURE — 72170 X-RAY EXAM OF PELVIS: CPT | Mod: 26,59

## 2023-11-29 PROCEDURE — 99285 EMERGENCY DEPT VISIT HI MDM: CPT | Mod: FS

## 2023-11-29 PROCEDURE — 73502 X-RAY EXAM HIP UNI 2-3 VIEWS: CPT | Mod: 26,RT

## 2023-11-29 PROCEDURE — 71045 X-RAY EXAM CHEST 1 VIEW: CPT | Mod: 26

## 2023-11-29 PROCEDURE — 72125 CT NECK SPINE W/O DYE: CPT | Mod: 26,MA

## 2023-11-29 PROCEDURE — 73080 X-RAY EXAM OF ELBOW: CPT | Mod: 26,RT

## 2023-11-29 PROCEDURE — 70450 CT HEAD/BRAIN W/O DYE: CPT | Mod: 26,MA

## 2023-11-29 PROCEDURE — 71260 CT THORAX DX C+: CPT | Mod: 26,MA

## 2023-11-29 PROCEDURE — 93010 ELECTROCARDIOGRAM REPORT: CPT

## 2023-11-29 RX ORDER — PROTHROMBIN COMPLEX CONCENTRATE (HUMAN) 25.5; 16.5; 24; 22; 22; 26 [IU]/ML; [IU]/ML; [IU]/ML; [IU]/ML; [IU]/ML; [IU]/ML
1500 POWDER, FOR SOLUTION INTRAVENOUS ONCE
Refills: 0 | Status: COMPLETED | OUTPATIENT
Start: 2023-11-29 | End: 2023-11-29

## 2023-11-29 RX ORDER — ONDANSETRON 8 MG/1
4 TABLET, FILM COATED ORAL ONCE
Refills: 0 | Status: COMPLETED | OUTPATIENT
Start: 2023-11-29 | End: 2023-11-29

## 2023-11-29 RX ORDER — MORPHINE SULFATE 50 MG/1
4 CAPSULE, EXTENDED RELEASE ORAL ONCE
Refills: 0 | Status: DISCONTINUED | OUTPATIENT
Start: 2023-11-29 | End: 2023-11-29

## 2023-11-29 RX ORDER — TRANEXAMIC ACID 100 MG/ML
1000 INJECTION, SOLUTION INTRAVENOUS ONCE
Refills: 0 | Status: COMPLETED | OUTPATIENT
Start: 2023-11-29 | End: 2023-11-29

## 2023-11-29 RX ADMIN — ONDANSETRON 4 MILLIGRAM(S): 8 TABLET, FILM COATED ORAL at 20:34

## 2023-11-29 RX ADMIN — MORPHINE SULFATE 4 MILLIGRAM(S): 50 CAPSULE, EXTENDED RELEASE ORAL at 20:34

## 2023-11-29 RX ADMIN — TRANEXAMIC ACID 220 MILLIGRAM(S): 100 INJECTION, SOLUTION INTRAVENOUS at 22:58

## 2023-11-29 RX ADMIN — MORPHINE SULFATE 4 MILLIGRAM(S): 50 CAPSULE, EXTENDED RELEASE ORAL at 22:24

## 2023-11-29 RX ADMIN — PROTHROMBIN COMPLEX CONCENTRATE (HUMAN) 400 INTERNATIONAL UNIT(S): 25.5; 16.5; 24; 22; 22; 26 POWDER, FOR SOLUTION INTRAVENOUS at 23:10

## 2023-11-29 NOTE — ED PROVIDER NOTE - PROGRESS NOTE DETAILS
Pt  became hypotensive  78 systolic,  2nd  line placed, emergency release  blood given, TXA and Kcentra -   transport upgraded to lights and sirens.  Pt  BP 90 systolic on repeat before  PRBC SG: Patient assessed at bedside.  EMS reports that patient was given TXA, PRBC, and Kcentra in the hospital.  Of note patient also had 1 episode of hypotension in the ambulance, resolved by the time she arrived.  Patient AOx3, only complaining of nausea at this time.  Zofran to be given.  Trauma alert called, surgery contacted.

## 2023-11-29 NOTE — ED PROVIDER NOTE - ATTENDING APP SHARED VISIT CONTRIBUTION OF CARE
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  Patient unable to get up secondary to pain to right hip.  Alert and oriented, GCS 15.  PERRL, EOMI, no entrapment.  No raccoon or mei sign.    NCAT.  Neck is supple, no midline C-Spine tenderness.  CN 2-12 intact.  Motor strength and sensory response is symmetric.  CVS RRR.  Resp CTA b/l.  No distress, speaking clearly.  abd soft NT/ND.  No shoulder, elbow or hand tenderness.  2+ Radial pulse b/l and equal. Full ROM at all joints of b/l UE. No midline vertebral tenderness.  No rib tenderness, no crepitus. No ecchymosis to abd wall, back wall, or chest wall.right hip ttp  painful ROM right hip   Full ROM  knees and ankles.  left leg externally rotated   NVI dsitally.     CT pan scan Acute comminuted displaced fracture right acetabulum with adjacent   	iliopsoas hematoma (302/128-141).  	  	Old healed left pubic fracture..  	  	Patchy areas of subsegmental consolidation right lower lobe and   	peripheral right middle lobe (302/49, 55). Follow-up imaging to document   	resolution recommended.  Patient sent for transfer [place] for trauma/Ortho.    While in [facility] [place] waiting transfer patient became hypotensive 2 units PRBC emergency release started TXA Kcentra given.    Patient stable for transfer North lights and sirens ALS

## 2023-11-29 NOTE — ED ADULT NURSE REASSESSMENT NOTE - NS ED NURSE REASSESS COMMENT FT1
Emergency blood completed prior to transfer. K-centra and TXA infusion at time of transfer. VSS. B/L IV in place. Transport at bedside.

## 2023-11-29 NOTE — ED PROVIDER NOTE - CARE PLAN
1 Principal Discharge DX:	Iliopsoas muscle hematoma  Secondary Diagnosis:	Hypotension  Secondary Diagnosis:	Hemorrhage requiring transfusion  Secondary Diagnosis:	Right acetabular fracture

## 2023-11-29 NOTE — ED ADULT TRIAGE NOTE - CHIEF COMPLAINT QUOTE
pt BIBA for trip and fall, on xarelto denies head injury or LOC. complaining of L hip pain unable to ambulate.

## 2023-11-29 NOTE — ED PROVIDER NOTE - PHYSICAL EXAMINATION
Physical Exam    Vital Signs: I have reviewed the initial vital signs.  Constitutional: no acute distress  Eyes: Conjunctiva pink, Sclera clear, PERRLA, EOMI.  Cardiovascular: S1 and S2, regular rate, regular rhythm, well-perfused extremities, radial pulses equal and 2+  Respiratory: unlabored respiratory effort, clear to auscultation bilaterally no wheezing, rales and rhonchi  Gastrointestinal: soft, non-tender abdomen, no pulsatile mass, normal bowl sounds  Musculoskeletal: supple neck, no lower extremity edema, no midline tenderness + right hip lateral tenderness, unable to ROM due to pain.   Integumentary: warm, dry, no rash  Neurologic: awake, alert, cranial nerves II-XII grossly intact, extremities’ motor and sensory functions grossly intact  Psychiatric: appropriate mood, appropriate affect

## 2023-11-29 NOTE — ED PROVIDER NOTE - OBJECTIVE STATEMENT
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  Patient unable to get up secondary to pain to right hip. no head injury no loss of consciousness. no nausea and vomiting. no abd pain no chest pian.

## 2023-11-29 NOTE — ED PROVIDER NOTE - CLINICAL SUMMARY MEDICAL DECISION MAKING FREE TEXT BOX
73-year-old female on Eliqu presents status post trip an d fall sustaining Acute comminuted displaced fracture right acetabulum with adjacent iliopsoas hematoma ([id number]).  [place] red blood cell Kcentra and TXA given patient sent life and sirens to [facility]

## 2023-11-30 DIAGNOSIS — S70.10XA CONTUSION OF UNSPECIFIED THIGH, INITIAL ENCOUNTER: ICD-10-CM

## 2023-11-30 LAB
ABO RH CONFIRMATION: SIGNIFICANT CHANGE UP
ABO RH CONFIRMATION: SIGNIFICANT CHANGE UP
ANION GAP SERPL CALC-SCNC: 12 MMOL/L — SIGNIFICANT CHANGE UP (ref 7–14)
ANION GAP SERPL CALC-SCNC: 12 MMOL/L — SIGNIFICANT CHANGE UP (ref 7–14)
APPEARANCE UR: CLEAR — SIGNIFICANT CHANGE UP
APPEARANCE UR: CLEAR — SIGNIFICANT CHANGE UP
APTT BLD: 37.8 SEC — SIGNIFICANT CHANGE UP (ref 27–39.2)
APTT BLD: 37.8 SEC — SIGNIFICANT CHANGE UP (ref 27–39.2)
BASOPHILS # BLD AUTO: 0.06 K/UL — SIGNIFICANT CHANGE UP (ref 0–0.2)
BASOPHILS # BLD AUTO: 0.06 K/UL — SIGNIFICANT CHANGE UP (ref 0–0.2)
BASOPHILS NFR BLD AUTO: 0.6 % — SIGNIFICANT CHANGE UP (ref 0–1)
BASOPHILS NFR BLD AUTO: 0.6 % — SIGNIFICANT CHANGE UP (ref 0–1)
BILIRUB UR-MCNC: NEGATIVE — SIGNIFICANT CHANGE UP
BILIRUB UR-MCNC: NEGATIVE — SIGNIFICANT CHANGE UP
BLD GP AB SCN SERPL QL: SIGNIFICANT CHANGE UP
BUN SERPL-MCNC: 20 MG/DL — SIGNIFICANT CHANGE UP (ref 10–20)
BUN SERPL-MCNC: 20 MG/DL — SIGNIFICANT CHANGE UP (ref 10–20)
CALCIUM SERPL-MCNC: 8.8 MG/DL — SIGNIFICANT CHANGE UP (ref 8.4–10.4)
CALCIUM SERPL-MCNC: 8.8 MG/DL — SIGNIFICANT CHANGE UP (ref 8.4–10.4)
CHLORIDE SERPL-SCNC: 105 MMOL/L — SIGNIFICANT CHANGE UP (ref 98–110)
CHLORIDE SERPL-SCNC: 105 MMOL/L — SIGNIFICANT CHANGE UP (ref 98–110)
CO2 SERPL-SCNC: 20 MMOL/L — SIGNIFICANT CHANGE UP (ref 17–32)
CO2 SERPL-SCNC: 20 MMOL/L — SIGNIFICANT CHANGE UP (ref 17–32)
COLOR SPEC: YELLOW — SIGNIFICANT CHANGE UP
COLOR SPEC: YELLOW — SIGNIFICANT CHANGE UP
CREAT SERPL-MCNC: 0.7 MG/DL — SIGNIFICANT CHANGE UP (ref 0.7–1.5)
CREAT SERPL-MCNC: 0.7 MG/DL — SIGNIFICANT CHANGE UP (ref 0.7–1.5)
DIFF PNL FLD: NEGATIVE — SIGNIFICANT CHANGE UP
DIFF PNL FLD: NEGATIVE — SIGNIFICANT CHANGE UP
EGFR: 91 ML/MIN/1.73M2 — SIGNIFICANT CHANGE UP
EGFR: 91 ML/MIN/1.73M2 — SIGNIFICANT CHANGE UP
EOSINOPHIL # BLD AUTO: 0.01 K/UL — SIGNIFICANT CHANGE UP (ref 0–0.7)
EOSINOPHIL # BLD AUTO: 0.01 K/UL — SIGNIFICANT CHANGE UP (ref 0–0.7)
EOSINOPHIL NFR BLD AUTO: 0.1 % — SIGNIFICANT CHANGE UP (ref 0–8)
EOSINOPHIL NFR BLD AUTO: 0.1 % — SIGNIFICANT CHANGE UP (ref 0–8)
GLUCOSE BLDC GLUCOMTR-MCNC: 96 MG/DL — SIGNIFICANT CHANGE UP (ref 70–99)
GLUCOSE BLDC GLUCOMTR-MCNC: 96 MG/DL — SIGNIFICANT CHANGE UP (ref 70–99)
GLUCOSE BLDC GLUCOMTR-MCNC: 98 MG/DL — SIGNIFICANT CHANGE UP (ref 70–99)
GLUCOSE BLDC GLUCOMTR-MCNC: 98 MG/DL — SIGNIFICANT CHANGE UP (ref 70–99)
GLUCOSE SERPL-MCNC: 100 MG/DL — HIGH (ref 70–99)
GLUCOSE SERPL-MCNC: 100 MG/DL — HIGH (ref 70–99)
GLUCOSE UR QL: NEGATIVE MG/DL — SIGNIFICANT CHANGE UP
GLUCOSE UR QL: NEGATIVE MG/DL — SIGNIFICANT CHANGE UP
HCT VFR BLD CALC: 30.4 % — LOW (ref 37–47)
HCT VFR BLD CALC: 30.4 % — LOW (ref 37–47)
HCT VFR BLD CALC: 30.9 % — LOW (ref 37–47)
HCT VFR BLD CALC: 30.9 % — LOW (ref 37–47)
HGB BLD-MCNC: 10 G/DL — LOW (ref 12–16)
HGB BLD-MCNC: 10 G/DL — LOW (ref 12–16)
HGB BLD-MCNC: 10.2 G/DL — LOW (ref 12–16)
HGB BLD-MCNC: 10.2 G/DL — LOW (ref 12–16)
IMM GRANULOCYTES NFR BLD AUTO: 0.5 % — HIGH (ref 0.1–0.3)
IMM GRANULOCYTES NFR BLD AUTO: 0.5 % — HIGH (ref 0.1–0.3)
INR BLD: 1.16 RATIO — SIGNIFICANT CHANGE UP (ref 0.65–1.3)
INR BLD: 1.16 RATIO — SIGNIFICANT CHANGE UP (ref 0.65–1.3)
INR BLD: 1.55 RATIO — HIGH (ref 0.65–1.3)
INR BLD: 1.55 RATIO — HIGH (ref 0.65–1.3)
KETONES UR-MCNC: NEGATIVE MG/DL — SIGNIFICANT CHANGE UP
KETONES UR-MCNC: NEGATIVE MG/DL — SIGNIFICANT CHANGE UP
LEUKOCYTE ESTERASE UR-ACNC: NEGATIVE — SIGNIFICANT CHANGE UP
LEUKOCYTE ESTERASE UR-ACNC: NEGATIVE — SIGNIFICANT CHANGE UP
LYMPHOCYTES # BLD AUTO: 0.72 K/UL — LOW (ref 1.2–3.4)
LYMPHOCYTES # BLD AUTO: 0.72 K/UL — LOW (ref 1.2–3.4)
LYMPHOCYTES # BLD AUTO: 7.2 % — LOW (ref 20.5–51.1)
LYMPHOCYTES # BLD AUTO: 7.2 % — LOW (ref 20.5–51.1)
MAGNESIUM SERPL-MCNC: 1.7 MG/DL — LOW (ref 1.8–2.4)
MAGNESIUM SERPL-MCNC: 1.7 MG/DL — LOW (ref 1.8–2.4)
MCHC RBC-ENTMCNC: 32.9 G/DL — SIGNIFICANT CHANGE UP (ref 32–37)
MCHC RBC-ENTMCNC: 32.9 G/DL — SIGNIFICANT CHANGE UP (ref 32–37)
MCHC RBC-ENTMCNC: 33 G/DL — SIGNIFICANT CHANGE UP (ref 32–37)
MCHC RBC-ENTMCNC: 33 G/DL — SIGNIFICANT CHANGE UP (ref 32–37)
MCHC RBC-ENTMCNC: 33.2 PG — HIGH (ref 27–31)
MCHC RBC-ENTMCNC: 33.2 PG — HIGH (ref 27–31)
MCHC RBC-ENTMCNC: 33.3 PG — HIGH (ref 27–31)
MCHC RBC-ENTMCNC: 33.3 PG — HIGH (ref 27–31)
MCV RBC AUTO: 100.7 FL — HIGH (ref 81–99)
MCV RBC AUTO: 100.7 FL — HIGH (ref 81–99)
MCV RBC AUTO: 101.3 FL — HIGH (ref 81–99)
MCV RBC AUTO: 101.3 FL — HIGH (ref 81–99)
MONOCYTES # BLD AUTO: 1.23 K/UL — HIGH (ref 0.1–0.6)
MONOCYTES # BLD AUTO: 1.23 K/UL — HIGH (ref 0.1–0.6)
MONOCYTES NFR BLD AUTO: 12.3 % — HIGH (ref 1.7–9.3)
MONOCYTES NFR BLD AUTO: 12.3 % — HIGH (ref 1.7–9.3)
NEUTROPHILS # BLD AUTO: 7.92 K/UL — HIGH (ref 1.4–6.5)
NEUTROPHILS # BLD AUTO: 7.92 K/UL — HIGH (ref 1.4–6.5)
NEUTROPHILS NFR BLD AUTO: 79.3 % — HIGH (ref 42.2–75.2)
NEUTROPHILS NFR BLD AUTO: 79.3 % — HIGH (ref 42.2–75.2)
NITRITE UR-MCNC: NEGATIVE — SIGNIFICANT CHANGE UP
NITRITE UR-MCNC: NEGATIVE — SIGNIFICANT CHANGE UP
NRBC # BLD: 0 /100 WBCS — SIGNIFICANT CHANGE UP (ref 0–0)
PH UR: 6.5 — SIGNIFICANT CHANGE UP (ref 5–8)
PH UR: 6.5 — SIGNIFICANT CHANGE UP (ref 5–8)
PHOSPHATE SERPL-MCNC: 4.3 MG/DL — SIGNIFICANT CHANGE UP (ref 2.1–4.9)
PHOSPHATE SERPL-MCNC: 4.3 MG/DL — SIGNIFICANT CHANGE UP (ref 2.1–4.9)
PLATELET # BLD AUTO: 305 K/UL — SIGNIFICANT CHANGE UP (ref 130–400)
PLATELET # BLD AUTO: 305 K/UL — SIGNIFICANT CHANGE UP (ref 130–400)
PLATELET # BLD AUTO: 329 K/UL — SIGNIFICANT CHANGE UP (ref 130–400)
PLATELET # BLD AUTO: 329 K/UL — SIGNIFICANT CHANGE UP (ref 130–400)
PMV BLD: 9.8 FL — SIGNIFICANT CHANGE UP (ref 7.4–10.4)
PMV BLD: 9.8 FL — SIGNIFICANT CHANGE UP (ref 7.4–10.4)
PMV BLD: 9.9 FL — SIGNIFICANT CHANGE UP (ref 7.4–10.4)
PMV BLD: 9.9 FL — SIGNIFICANT CHANGE UP (ref 7.4–10.4)
POTASSIUM SERPL-MCNC: 4.3 MMOL/L — SIGNIFICANT CHANGE UP (ref 3.5–5)
POTASSIUM SERPL-MCNC: 4.3 MMOL/L — SIGNIFICANT CHANGE UP (ref 3.5–5)
POTASSIUM SERPL-SCNC: 4.3 MMOL/L — SIGNIFICANT CHANGE UP (ref 3.5–5)
POTASSIUM SERPL-SCNC: 4.3 MMOL/L — SIGNIFICANT CHANGE UP (ref 3.5–5)
PROT UR-MCNC: SIGNIFICANT CHANGE UP MG/DL
PROT UR-MCNC: SIGNIFICANT CHANGE UP MG/DL
PROTHROM AB SERPL-ACNC: 13.2 SEC — HIGH (ref 9.95–12.87)
PROTHROM AB SERPL-ACNC: 13.2 SEC — HIGH (ref 9.95–12.87)
PROTHROM AB SERPL-ACNC: 17.8 SEC — HIGH (ref 9.95–12.87)
PROTHROM AB SERPL-ACNC: 17.8 SEC — HIGH (ref 9.95–12.87)
RBC # BLD: 3 M/UL — LOW (ref 4.2–5.4)
RBC # BLD: 3 M/UL — LOW (ref 4.2–5.4)
RBC # BLD: 3.07 M/UL — LOW (ref 4.2–5.4)
RBC # BLD: 3.07 M/UL — LOW (ref 4.2–5.4)
RBC # FLD: 17.4 % — HIGH (ref 11.5–14.5)
RBC # FLD: 17.4 % — HIGH (ref 11.5–14.5)
RBC # FLD: 17.6 % — HIGH (ref 11.5–14.5)
RBC # FLD: 17.6 % — HIGH (ref 11.5–14.5)
SODIUM SERPL-SCNC: 137 MMOL/L — SIGNIFICANT CHANGE UP (ref 135–146)
SODIUM SERPL-SCNC: 137 MMOL/L — SIGNIFICANT CHANGE UP (ref 135–146)
SP GR SPEC: >1.03 — HIGH (ref 1–1.03)
SP GR SPEC: >1.03 — HIGH (ref 1–1.03)
UROBILINOGEN FLD QL: 1 MG/DL — SIGNIFICANT CHANGE UP (ref 0.2–1)
UROBILINOGEN FLD QL: 1 MG/DL — SIGNIFICANT CHANGE UP (ref 0.2–1)
WBC # BLD: 8.7 K/UL — SIGNIFICANT CHANGE UP (ref 4.8–10.8)
WBC # BLD: 8.7 K/UL — SIGNIFICANT CHANGE UP (ref 4.8–10.8)
WBC # BLD: 9.99 K/UL — SIGNIFICANT CHANGE UP (ref 4.8–10.8)
WBC # BLD: 9.99 K/UL — SIGNIFICANT CHANGE UP (ref 4.8–10.8)
WBC # FLD AUTO: 8.7 K/UL — SIGNIFICANT CHANGE UP (ref 4.8–10.8)
WBC # FLD AUTO: 8.7 K/UL — SIGNIFICANT CHANGE UP (ref 4.8–10.8)
WBC # FLD AUTO: 9.99 K/UL — SIGNIFICANT CHANGE UP (ref 4.8–10.8)
WBC # FLD AUTO: 9.99 K/UL — SIGNIFICANT CHANGE UP (ref 4.8–10.8)

## 2023-11-30 PROCEDURE — 86900 BLOOD TYPING SEROLOGIC ABO: CPT

## 2023-11-30 PROCEDURE — 85730 THROMBOPLASTIN TIME PARTIAL: CPT

## 2023-11-30 PROCEDURE — 84100 ASSAY OF PHOSPHORUS: CPT

## 2023-11-30 PROCEDURE — 86850 RBC ANTIBODY SCREEN: CPT

## 2023-11-30 PROCEDURE — 85027 COMPLETE CBC AUTOMATED: CPT

## 2023-11-30 PROCEDURE — 93306 TTE W/DOPPLER COMPLETE: CPT | Mod: 26

## 2023-11-30 PROCEDURE — 87635 SARS-COV-2 COVID-19 AMP PRB: CPT

## 2023-11-30 PROCEDURE — 93306 TTE W/DOPPLER COMPLETE: CPT

## 2023-11-30 PROCEDURE — 86901 BLOOD TYPING SEROLOGIC RH(D): CPT

## 2023-11-30 PROCEDURE — 84443 ASSAY THYROID STIM HORMONE: CPT

## 2023-11-30 PROCEDURE — 80048 BASIC METABOLIC PNL TOTAL CA: CPT

## 2023-11-30 PROCEDURE — 99223 1ST HOSP IP/OBS HIGH 75: CPT

## 2023-11-30 PROCEDURE — 71045 X-RAY EXAM CHEST 1 VIEW: CPT

## 2023-11-30 PROCEDURE — 85025 COMPLETE CBC W/AUTO DIFF WBC: CPT

## 2023-11-30 PROCEDURE — 99222 1ST HOSP IP/OBS MODERATE 55: CPT | Mod: GC

## 2023-11-30 PROCEDURE — 36415 COLL VENOUS BLD VENIPUNCTURE: CPT

## 2023-11-30 PROCEDURE — 82746 ASSAY OF FOLIC ACID SERUM: CPT

## 2023-11-30 PROCEDURE — 73560 X-RAY EXAM OF KNEE 1 OR 2: CPT | Mod: 26,RT

## 2023-11-30 PROCEDURE — 99232 SBSQ HOSP IP/OBS MODERATE 35: CPT

## 2023-11-30 PROCEDURE — 97116 GAIT TRAINING THERAPY: CPT | Mod: GP

## 2023-11-30 PROCEDURE — 81003 URINALYSIS AUTO W/O SCOPE: CPT

## 2023-11-30 PROCEDURE — 97163 PT EVAL HIGH COMPLEX 45 MIN: CPT | Mod: GP

## 2023-11-30 PROCEDURE — 80053 COMPREHEN METABOLIC PANEL: CPT

## 2023-11-30 PROCEDURE — 71045 X-RAY EXAM CHEST 1 VIEW: CPT | Mod: 26

## 2023-11-30 PROCEDURE — 83735 ASSAY OF MAGNESIUM: CPT

## 2023-11-30 PROCEDURE — 85610 PROTHROMBIN TIME: CPT

## 2023-11-30 PROCEDURE — 73560 X-RAY EXAM OF KNEE 1 OR 2: CPT | Mod: RT

## 2023-11-30 PROCEDURE — 94640 AIRWAY INHALATION TREATMENT: CPT

## 2023-11-30 PROCEDURE — 72191 CT ANGIOGRAPH PELV W/O&W/DYE: CPT | Mod: 26,MA

## 2023-11-30 PROCEDURE — 82962 GLUCOSE BLOOD TEST: CPT

## 2023-11-30 PROCEDURE — 82728 ASSAY OF FERRITIN: CPT

## 2023-11-30 PROCEDURE — 83550 IRON BINDING TEST: CPT

## 2023-11-30 PROCEDURE — 99291 CRITICAL CARE FIRST HOUR: CPT

## 2023-11-30 PROCEDURE — 97530 THERAPEUTIC ACTIVITIES: CPT | Mod: GP

## 2023-11-30 PROCEDURE — 99222 1ST HOSP IP/OBS MODERATE 55: CPT

## 2023-11-30 PROCEDURE — 83540 ASSAY OF IRON: CPT

## 2023-11-30 PROCEDURE — 82607 VITAMIN B-12: CPT

## 2023-11-30 RX ORDER — ENOXAPARIN SODIUM 100 MG/ML
40 INJECTION SUBCUTANEOUS EVERY 24 HOURS
Refills: 0 | Status: DISCONTINUED | OUTPATIENT
Start: 2023-11-30 | End: 2023-11-30

## 2023-11-30 RX ORDER — TIOTROPIUM BROMIDE 18 UG/1
1 CAPSULE ORAL; RESPIRATORY (INHALATION)
Refills: 0 | DISCHARGE

## 2023-11-30 RX ORDER — CHLORHEXIDINE GLUCONATE 213 G/1000ML
1 SOLUTION TOPICAL DAILY
Refills: 0 | Status: DISCONTINUED | OUTPATIENT
Start: 2023-11-30 | End: 2023-12-04

## 2023-11-30 RX ORDER — TIOTROPIUM BROMIDE 18 UG/1
1 CAPSULE ORAL; RESPIRATORY (INHALATION)
Qty: 0 | Refills: 0 | DISCHARGE

## 2023-11-30 RX ORDER — LISINOPRIL 2.5 MG/1
10 TABLET ORAL
Refills: 0 | Status: DISCONTINUED | OUTPATIENT
Start: 2023-11-30 | End: 2023-11-30

## 2023-11-30 RX ORDER — LEVOTHYROXINE SODIUM 125 MCG
75 TABLET ORAL DAILY
Refills: 0 | Status: DISCONTINUED | OUTPATIENT
Start: 2023-11-30 | End: 2023-12-04

## 2023-11-30 RX ORDER — BUDESONIDE AND FORMOTEROL FUMARATE DIHYDRATE 160; 4.5 UG/1; UG/1
2 AEROSOL RESPIRATORY (INHALATION)
Refills: 0 | Status: DISCONTINUED | OUTPATIENT
Start: 2023-11-30 | End: 2023-12-04

## 2023-11-30 RX ORDER — ONDANSETRON 8 MG/1
4 TABLET, FILM COATED ORAL ONCE
Refills: 0 | Status: COMPLETED | OUTPATIENT
Start: 2023-11-30 | End: 2023-11-30

## 2023-11-30 RX ORDER — OXYCODONE HYDROCHLORIDE 5 MG/1
5 TABLET ORAL EVERY 6 HOURS
Refills: 0 | Status: DISCONTINUED | OUTPATIENT
Start: 2023-11-30 | End: 2023-12-04

## 2023-11-30 RX ORDER — LEVOTHYROXINE SODIUM 125 MCG
50 TABLET ORAL DAILY
Refills: 0 | Status: DISCONTINUED | OUTPATIENT
Start: 2023-11-30 | End: 2023-12-04

## 2023-11-30 RX ORDER — PREGABALIN 225 MG/1
1000 CAPSULE ORAL DAILY
Refills: 0 | Status: DISCONTINUED | OUTPATIENT
Start: 2023-11-30 | End: 2023-12-01

## 2023-11-30 RX ORDER — SENNA PLUS 8.6 MG/1
2 TABLET ORAL AT BEDTIME
Refills: 0 | Status: DISCONTINUED | OUTPATIENT
Start: 2023-11-30 | End: 2023-12-04

## 2023-11-30 RX ORDER — LISINOPRIL 2.5 MG/1
1 TABLET ORAL
Refills: 0 | DISCHARGE

## 2023-11-30 RX ORDER — ENOXAPARIN SODIUM 100 MG/ML
40 INJECTION SUBCUTANEOUS EVERY 24 HOURS
Refills: 0 | Status: DISCONTINUED | OUTPATIENT
Start: 2023-11-30 | End: 2023-12-01

## 2023-11-30 RX ORDER — OXYCODONE HYDROCHLORIDE 5 MG/1
5 TABLET ORAL EVERY 6 HOURS
Refills: 0 | Status: DISCONTINUED | OUTPATIENT
Start: 2023-11-30 | End: 2023-11-30

## 2023-11-30 RX ORDER — SODIUM CHLORIDE 9 MG/ML
1000 INJECTION, SOLUTION INTRAVENOUS
Refills: 0 | Status: DISCONTINUED | OUTPATIENT
Start: 2023-11-30 | End: 2023-11-30

## 2023-11-30 RX ORDER — ENOXAPARIN SODIUM 100 MG/ML
30 INJECTION SUBCUTANEOUS EVERY 24 HOURS
Refills: 0 | Status: DISCONTINUED | OUTPATIENT
Start: 2023-11-30 | End: 2023-11-30

## 2023-11-30 RX ORDER — METOPROLOL TARTRATE 50 MG
50 TABLET ORAL DAILY
Refills: 0 | Status: DISCONTINUED | OUTPATIENT
Start: 2023-11-30 | End: 2023-11-30

## 2023-11-30 RX ORDER — MAGNESIUM SULFATE 500 MG/ML
2 VIAL (ML) INJECTION ONCE
Refills: 0 | Status: COMPLETED | OUTPATIENT
Start: 2023-11-30 | End: 2023-11-30

## 2023-11-30 RX ORDER — METOPROLOL TARTRATE 50 MG
25 TABLET ORAL EVERY 12 HOURS
Refills: 0 | Status: DISCONTINUED | OUTPATIENT
Start: 2023-11-30 | End: 2023-12-01

## 2023-11-30 RX ORDER — HYDROMORPHONE HYDROCHLORIDE 2 MG/ML
0.5 INJECTION INTRAMUSCULAR; INTRAVENOUS; SUBCUTANEOUS
Refills: 0 | Status: DISCONTINUED | OUTPATIENT
Start: 2023-11-30 | End: 2023-11-30

## 2023-11-30 RX ORDER — METOPROLOL TARTRATE 50 MG
1 TABLET ORAL
Refills: 0 | DISCHARGE

## 2023-11-30 RX ORDER — OXYCODONE HYDROCHLORIDE 5 MG/1
2.5 TABLET ORAL EVERY 4 HOURS
Refills: 0 | Status: DISCONTINUED | OUTPATIENT
Start: 2023-11-30 | End: 2023-12-04

## 2023-11-30 RX ORDER — ACETAMINOPHEN 500 MG
650 TABLET ORAL EVERY 6 HOURS
Refills: 0 | Status: DISCONTINUED | OUTPATIENT
Start: 2023-11-30 | End: 2023-12-04

## 2023-11-30 RX ORDER — FOLIC ACID 0.8 MG
1 TABLET ORAL DAILY
Refills: 0 | Status: DISCONTINUED | OUTPATIENT
Start: 2023-11-30 | End: 2023-12-01

## 2023-11-30 RX ORDER — ATORVASTATIN CALCIUM 80 MG/1
10 TABLET, FILM COATED ORAL AT BEDTIME
Refills: 0 | Status: DISCONTINUED | OUTPATIENT
Start: 2023-11-30 | End: 2023-12-04

## 2023-11-30 RX ORDER — OXYCODONE HYDROCHLORIDE 5 MG/1
5 TABLET ORAL EVERY 4 HOURS
Refills: 0 | Status: DISCONTINUED | OUTPATIENT
Start: 2023-11-30 | End: 2023-11-30

## 2023-11-30 RX ORDER — TIOTROPIUM BROMIDE 18 UG/1
2 CAPSULE ORAL; RESPIRATORY (INHALATION) DAILY
Refills: 0 | Status: DISCONTINUED | OUTPATIENT
Start: 2023-11-30 | End: 2023-12-04

## 2023-11-30 RX ADMIN — OXYCODONE HYDROCHLORIDE 2.5 MILLIGRAM(S): 5 TABLET ORAL at 15:30

## 2023-11-30 RX ADMIN — LISINOPRIL 10 MILLIGRAM(S): 2.5 TABLET ORAL at 06:08

## 2023-11-30 RX ADMIN — OXYCODONE HYDROCHLORIDE 5 MILLIGRAM(S): 5 TABLET ORAL at 22:40

## 2023-11-30 RX ADMIN — Medication 25 MILLIGRAM(S): at 17:45

## 2023-11-30 RX ADMIN — OXYCODONE HYDROCHLORIDE 2.5 MILLIGRAM(S): 5 TABLET ORAL at 16:09

## 2023-11-30 RX ADMIN — ONDANSETRON 4 MILLIGRAM(S): 8 TABLET, FILM COATED ORAL at 00:23

## 2023-11-30 RX ADMIN — ENOXAPARIN SODIUM 40 MILLIGRAM(S): 100 INJECTION SUBCUTANEOUS at 22:41

## 2023-11-30 RX ADMIN — Medication 50 MILLIGRAM(S): at 06:08

## 2023-11-30 RX ADMIN — SODIUM CHLORIDE 50 MILLILITER(S): 9 INJECTION, SOLUTION INTRAVENOUS at 10:28

## 2023-11-30 RX ADMIN — ATORVASTATIN CALCIUM 10 MILLIGRAM(S): 80 TABLET, FILM COATED ORAL at 22:40

## 2023-11-30 RX ADMIN — BUDESONIDE AND FORMOTEROL FUMARATE DIHYDRATE 2 PUFF(S): 160; 4.5 AEROSOL RESPIRATORY (INHALATION) at 22:42

## 2023-11-30 RX ADMIN — BUDESONIDE AND FORMOTEROL FUMARATE DIHYDRATE 2 PUFF(S): 160; 4.5 AEROSOL RESPIRATORY (INHALATION) at 11:47

## 2023-11-30 RX ADMIN — CHLORHEXIDINE GLUCONATE 1 APPLICATION(S): 213 SOLUTION TOPICAL at 11:51

## 2023-11-30 RX ADMIN — SENNA PLUS 2 TABLET(S): 8.6 TABLET ORAL at 22:40

## 2023-11-30 RX ADMIN — Medication 25 GRAM(S): at 10:29

## 2023-11-30 NOTE — CONSULT NOTE ADULT - ASSESSMENT
Assessment & Plan    73y Female  s/p mechanical fall, with acute R comminuted acetabular fracture.      NEURO:  #Acute pain    -APAP prn    -Oxy IR 5 q6 PRN for moderate pain    -Dilaudid 0.5 q4 PRN for severe pain     RESP:   #Oxygenation    -wean off 2L NC to RA as tolerated  #Hx of emphysema (not on home O2)   -Continue spiriva   -Continue advair   #Activity    -Bedrest per ortho    CARDS:   #Hematoma iliopsoas/retroperitoneal hematoma   - Serial Hgb monitoring q6   - Echo pending, cardio clearance required   #Hx Afib   -Continue metoprolol 25 q12   -Holding home Xaralto     -Rate controlled  #Hx HTN   - Lisinopril 10 q12  Rx-lisinopril 10 two times a day  metoprolol tartrate 50 daily      GI/NUTR:   #Diet CLD    - Diet ok per ortho    -aspiration precautions, HOB 30  #GI Prophylaxis    -not indicated  #Hx HLD    - Continue atorvastatin 10mg  #Bowel regimen    -senna    -last bowel movement unknown       /RENAL:   #urine output in crtically ill    Labs:          BUN/Cr- 19/0.8  -->          Electrolytes-Na 138 // K 3.8 // Mg 1.9 //  Phos -- (11-29 @ 20:28)  #maintain euvolemia             HEME/ONC:   #Hx DVT/PE  - s/p IVC filter placed 10 years ago  #DVT prophylaxsis  -Lovenox     Labs: Hb/Hct:  10.8/32.2  (11-29 @ 20:28)  -->,  9.3/28.2  (11-29 @ 22:49)  -->                      Plts:  413  -->,  358  -->                 PTT/INR:  43.4/2.13  --->     Blood Consent-obtain if acute anemia, q6 CBC    ID:  #leukocytosis   WBC- 5.21  --->>,  11.38  --->>  Temp trend- 24hrs T(F): 96 (11-29 @ 23:00), Max: 97.6 (11-29 @ 19:52)  Current antibiotics-n/a      ENDO:  #Hx hypothyroidism   -Levothyroxine per patient schedule  -FSG q6 if NPO or Tube feeds    -Glucose goal 140-180    -if above 180 start ISS    MSK:  #R comminuted acetabular fx   -Ortho following    -F/u surgical plan   -Needs medical clearance/risk stratification; cardio, pulm consult placed     Activity - Weight Bearing Status:     Left Lower Extremity:  Non-Weight Bearing    Right Lower Extremity:  Non-Weight Bearing (11-30-23 @ 04:00)  Activity - Bedrest (11-30-23 @ 04:00)      LINES/DRAINS:  PIV    ADVANCED DIRECTIVES:  Full Code    HCP/Emergency Contact-    INDICATION FOR SICU: Hypotension s/p mechanical fall             DISPO:   Case discussed with attending Dr. Chester Assessment & Plan    73y Female  s/p mechanical fall, with acute R comminuted acetabular fracture.      NEURO:  #Acute pain    -APAP prn    -Oxy IR 5 q6 PRN for moderate pain    -Dilaudid 0.5 q4 PRN for severe pain     RESP:   #Oxygenation    -wean off 2L NC to RA as tolerated  #Hx of emphysema (not on home O2)   -Continue spiriva   -Continue advair   #Activity    -Bedrest per ortho    CARDS:   #Hematoma iliopsoas/retroperitoneal hematoma   - Serial Hgb monitoring q6   - Echo pending, cardio clearance required   #Hx Afib   -Continue metoprolol 25 q12   -Holding home Xaralto     -Rate controlled  #Hx HTN   - Lisinopril 10 q12  Rx-lisinopril 10 two times a day  metoprolol tartrate 50 daily      GI/NUTR:   #Diet CLD    - Diet ok per ortho    -aspiration precautions, HOB 30  #GI Prophylaxis    -not indicated  #Hx HLD    - Continue atorvastatin 10mg  #Bowel regimen    -senna    -last bowel movement unknown       /RENAL:   #urine output in crtically ill    Labs:          BUN/Cr- 19/0.8  -->          Electrolytes-Na 138 // K 3.8 // Mg 1.9 //  Phos -- (11-29 @ 20:28)  #maintain euvolemia             HEME/ONC:   #Hx DVT/PE  - s/p IVC filter placed 10 years ago  #DVT prophylaxsis  -Holding while trenfing Hgb     Labs: Hb/Hct:  10.8/32.2  (11-29 @ 20:28)  -->,  9.3/28.2  (11-29 @ 22:49)  -->                      Plts:  413  -->,  358  -->                 PTT/INR:  43.4/2.13  --->       ID:  #leukocytosis   WBC- 5.21  --->>,  11.38  --->>  Temp trend- 24hrs T(F): 96 (11-29 @ 23:00), Max: 97.6 (11-29 @ 19:52)  Current antibiotics-n/a      ENDO:  #Hx hypothyroidism   -Levothyroxine per patient schedule      -Glucose goal 140-180    -if above 180 start ISS    MSK:  #R comminuted acetabular fx   -Ortho following    -F/u surgical plan   -Needs medical clearance/risk stratification; cardio, pulm consult placed     Activity - Weight Bearing Status:     Left Lower Extremity:  Non-Weight Bearing    Right Lower Extremity:  Non-Weight Bearing (11-30-23 @ 04:00)  Activity - Bedrest (11-30-23 @ 04:00)      LINES/DRAINS:  PIV    ADVANCED DIRECTIVES:  Full Code    HCP/Emergency Contact-    INDICATION FOR SICU: Hypotension s/p mechanical fall             DISPO:   Case discussed with attending Dr. Chester

## 2023-11-30 NOTE — H&P ADULT - ATTENDING COMMENTS
I evaluated this patient at around 12:30 am on 11/30/2023.    This is 74 y/o female with  PMHx of Afib (on Xarelto), h/o DVT/PE, s/p IVC filter, HTN, hypothyroidism, COPD/emphysema, ex-smoker (quit >16 years ago) who presents as a TRAUMA ALERT (transfer from [facility]) s/p mechanical fall (-HT -LOC +AC). Patient reportedly slipped and fell while in the kitchen yesterday afternoon. Complaining of right hip pain and inability to ambulate. She is accompanied by her  and son. She does not use an assistive device at baseline. Of note, patient has a h/o x2 prior falls, resulting in left pubic ramus fracture that was treated nonoperatively. Denies pain elsewhere. Denies any numbness or tingling. Reports her last Xarelto dose was the evening prior to presentation (11/28 PM).   As per ED patient was temporary hypotensive prior to transfer at [facility] and was transfused 1 unit PRBC and KCentra.    Primary and secondary surveys were performed.    AAO x3  GCS 15.  Neuro intact.  Neck: no step-offs  Chest: clear.  CV : rrr  Abdomen: soft, nontender.  Extr: Right hip pain to motion.    New CTA Abd/Pelvis was performed after patient's arrival that shows no active extravasation but slightly increased pelvic hematoma.    All images and labs were reviewed.    ASSESSMENT:  74 y/o female, S/P Fall.  Right Acetabular Fracture.  Right Iliopsoas Hematoma.  Acute blood loss anemia.  Coagulopathy due to Xarelto.  At risk for hemodynamic instability.  Acute pain due to trauma.    PLAN:  - pain control  - Ortho eval  - continuous O2Sat and hemodynamic monitoring  - keep MAP>65  - hold Xarelto  - serial H&H  - follow serum electrolytes and UOP  - DVT prophylaxis with SCDs only for now.  Admit the patient to SICU

## 2023-11-30 NOTE — H&P ADULT - HISTORY OF PRESENT ILLNESS
TRAUMA ACTIVATION LEVEL: ALERT    MECHANISM OF INJURY:      [] Blunt	[] MVC	[X] Fall	[] Pedestrian Struck	[] Motorcycle accident         [] Penetrating	[] Gun Shot Wound 		[] Stab Wound    GCS: 	E: 4	V: 5	M: 6    73F w/ PMHx of Afib (on Xarelto), h/o DVT/PE, s/p IVC filter, HTN, hypothyroidism, COPD/emphysema, ex-smoker (quit >16 years ago) who presents as a TRAUMA ALERT (transfer from [facility]) s/p mechanical fall (-HT -LOC +AC). Patient reportedly slipped and fell while in the kitchen yesterday afternoon. Complaining of right hip pain and inability to ambulate. She is accompanied by her  and son. She does not use an assistive device at baseline. Of note, patient has a h/o x2 prior falls, resulting in left pubic ramus fracture that was treated nonoperatively. Denies pain elsewhere. Denies any numbness or tingling. Reports her last Xarelto dose was the evening prior to presentation (11/28 PM).     Primary Survey:    A - airway intact  B - bilateral breath sounds and good chest rise  C - initial BP  BP: 132/69 (11-29-23 @ 23:10) *** , HR HR: 70 (11-29-23 @ 23:10) *** , palpable pulses in all extremities  D - GCS 15 on arrival  Exposure obtained    Frailty Score:   Fatigue: How much time during the previous 4 weeks did you feel tired?   All or most of the time [   ] Yes (1pt)    [ X ] No  (0pts)  Resistance: Do you have any difficulty walking up 10 steps alone without resting and without aids? [   ] Yes (1pt)    [ X ] No  (0pts)  Ambulation: Do you have any difficulty walking several hundred yards alone without aids? [   ] Yes (1pt)    [ X ] No  (0pts)  Illness: how many illnesses do you have out of list of 11 total? [  X ] 5 or more (1pt) [  ] < 5 (0pts)  Loss of weight: Have you had weight loss of 5% or more? [   ] Yes (1pt)    [  ] No  (0pts)    Total Score: 1    Score 1-2: Consult medical comangement  Score 3-4: Consult Geriatric service   Score 5: Consult Palliative service [phone]    Norman Balderrama G, Nicholas VERNON, Rose SCOTT, Saulo B. Frality: toward a clinical definition. J AM Med Dir Assoc. 2008; 9 (2): 71-72  Karina JE, Basilio TK, Kael DK. A simple frailty questionnaire (FRAIL) predicts outcomes in middle aged  Americans. J Nutr Health Aging. 2012; 16 (7): 601-608

## 2023-11-30 NOTE — CONSULT NOTE ADULT - ASSESSMENT
Assessment and Plan:    73F w/ PMHx of Afib (on Xarelto), h/o DVT/PE, s/p IVC filter, HTN, hypothyroidism, COPD/emphysema, ex-smoker (quit >16 years ago) who presents as a TRAUMA ALERT (transfer from [facility]) s/p mechanical fall (-HT -LOC +AC). Patient reportedly slipped and fell while in the kitchen yesterday afternoon. Due to the Pt's history of COPD, her daily activity at home is limited. She cannot climb two flights of stairs continuously and her strenuous house work is done by her . Pt cannot achieve 4 METs. RCRI is 0. Pt is moderate risk for a low to moderate risk procedure.    Plan;  - Hold Xarelto 48 hrs prior to procedure Assessment and Plan:    73F w/ PMHx of Afib (on Xarelto), h/o DVT/PE, s/p IVC filter, HTN, hypothyroidism, COPD/emphysema, ex-smoker (quit >16 years ago) who presents as a TRAUMA ALERT (transfer from [facility]) s/p mechanical fall (-HT -LOC +AC). Patient reportedly slipped and fell while in the kitchen yesterday afternoon. Due to the Pt's history of COPD, her daily activity at home is limited. She cannot climb two flights of stairs continuously and her strenuous house work is done by her . Pt cannot achieve 4 METs. RCRI is 0. Pt is moderate risk for a low to moderate risk procedure.    Plan;  - Hold Xarelto 48 hrs prior to procedure  - recommend TTE #s/p fall, R acetabular fx pending possible fixation  #Acute anemia secondary to R iliopsoas hematoma  #Atrial Fibrillation    - CHADSVASC 4  #Hx of DVT/PE s/p IVC filter on Xarelto  #HTN, HLD  #COPD  #Hypothyroidism    Recommendations:  - Pt cannot achieve 4 METS, limited by chronic dyspnea likely secondary to COPD  - RCRI 0  - Please obtain TTE  - Moderate risk for moderate risk procedure provided no significant findings on TTE  - Hold Xarelto 48 hrs prior to procedure #s/p fall, R acetabular fx pending possible fixation  #Acute anemia secondary to R iliopsoas hematoma  #Atrial Fibrillation    - CHADSVASC 4  #Hx of DVT/PE s/p IVC filter on Xarelto  #HTN, HLD  #COPD  #Hypothyroidism    Recommendations:  - Pt cannot achieve 4 METS, limited by chronic dyspnea likely secondary to COPD  - Please obtain TTE  - Moderate risk for moderate risk procedure provided no significant findings on TTE  - No current contraindications to procedure - no malignant arrhythmia, decompensated CHF or ACS  - Hold Xarelto 48 hrs prior to procedure #s/p fall, R acetabular fx pending possible fixation  #Acute anemia secondary to R iliopsoas hematoma  #Atrial Fibrillation    - CHADSVASC 4  #Hx of DVT/PE s/p IVC filter on Xarelto  #HTN, HLD  #COPD  #Hypothyroidism    Recommendations:  - Pt cannot achieve 4 METS, limited by chronic dyspnea likely secondary to COPD  - Please obtain TTE  - Moderate risk for moderate risk procedure provided if no significant findings on TTE  - No current contraindications to procedure - no malignant arrhythmia, decompensated CHF or ACS  - Hold Xarelto 48 hrs prior to procedure  -continue metoprolol   -monitor kidney function and maintain electrolytes within normal ranges  -try to keep patient euvolemic per op

## 2023-11-30 NOTE — CONSULT NOTE ADULT - ATTENDING COMMENTS
This patient has a high probability of sudden, clinically significant deterioration, which requires the highest level of physician preparedness to intervene urgently. I managed/supervised life or organ supporting interventions that required frequent physician assessment. I devoted my full attention in the ICU to the direct care of this patient for the period of time indicated below. Time I spent with the family or surrogate(s) is included only if the patient was incapable of providing the necessary information or participating in medical decision making. Time devoted to teaching and to any procedures I billed separately is not included.     73y Female PMHx of Afib (on Xarelto), h/o DVT/PE, s/p IVC filter, HTN, hypothyroidism, COPD/emphysema, ex-smoker (quit >16 years ago) who s/p mechanical fall (-HT -LOC +AC) admitted to SICU. Received Kcentra and one unit pRBC at [facility] before transferred to [place] for trauma evaluation.  Injuries: R acetabular fracture, retroperitoneal hematoma, old left pubic rami fx    Patient is examined and evaluated at the bedside with SICU team. Treatment plan discussed with SICU team, nurses and primary team.   Chest X-ray and all relevant studies reviewed during rounds.  Will continue hemodynamic monitoring as per protocol in SICU.    Neuro: alert, oriented to person, place, and time  Medications -  oxycodone, dilaudid --> d/c dilaudid, oxy 2.5/5 for moderate/severe, add standing tylenol  Studies - no new studies since admission    Respiratory: RR  SaO2 98% on 2L NC   Medications - Spiriva, symbicort   CXR reviewed and interpreted by me - mild vascular congestion, no effusions  Mobilize when cleared by ortho    CV: HR 60 /63 MAP 90  Medications - metoprolol 25mg BID, atorvastatin  Home medications - metoprolol XL 50mg, lisinopril 10mg, atorvastatin 10mg, xarelto  Studies - ekg reviewed    GI: abd s/nt/nd  Last BM - yesterday  Nutrition - clear liquid --> make NPO until next stable CBC  Medications - senna  Ppx - not indicated    Renal: Continue I&Os monitoring, replete electrolytes as needed, magnesium repletion ordered for hypomagnesemia  11-30    137  |  105  |  20  ----------------------------<  100<H>  4.3   |  20  |  0.7    Ca    8.8      30 Nov 2023 08:23  Phos  4.3     11-30  Mg     1.7     11-30    TPro  5.4<L>  /  Alb  3.6  /  TBili  0.3  /  DBili  x   /  AST  19  /  ALT  11  /  AlkPhos  58  11-29    UOP - none recorded, may need to bladder scan  IVF - none, start fluids while NPO until next stable CBC    Heme: continue to evaluate for acute blood loss anemia- trend Hg/Hct for retroperitoneal hematoma                        10.2   9.99  )-----------( 329      ( 30 Nov 2023 08:24 )             30.9   INR 1.55  Anticoagulation - start lovenox if 1PM cbc is stable    ID: trend WBC, monitor for fevers  Antibiotics - none    Endocrine: Prevent and treat hyperglycemia with insulin as needed, synthroid for hypothyroidism    PV: follow pulse exam    MSK: abrasions R elbow, R knee, needs R knee xray, NWB B/L LE    Skin: decub precautions, abrasions as above    Lines: PIV   DVT Prophylaxis: holding until stable CBC  Stress Gastritis Prophylaxis: not indicated  Disposition: SICU    I saw and evaluated the patient personally. I have reviewed and agree with note above. Treatment plan discussed with SICU team, nurses and primary team at the time of the multidisciplinary rounds.     Kush May MD  Trauma/ACS/SCC Attending
Impression:     Severe emphysema   COPD not in exacerbation   Right acetabular fracture   Lung nodule/opacity RLL   Preop clearance     Impression and plan above have been altered and are my own.
Agree with above  Plan as outlined above (note edited)
Orthopedic Trauma Attending  Patient seen and examined.  PMHx, Psurg Hx, Medications and Allergies reviewed.  X-rays and CT reviewed.  Agree with findings, assessment and plan.  Impression: Anterior column / quadrilatral plate acetabular fracture.  Could ORIF to reduce risk of worsening displacement and reduce risk of post traumatic arthritis, but would not allow early weight bearing.  Discussed injury and treatment options with patient.  She is high risk for surgery.  She is going to think about her preferences.  In meantime, remains NWB on RLE.  Mobilize bed to chair.  Will continue to follow.

## 2023-11-30 NOTE — PROGRESS NOTE ADULT - ASSESSMENT
73y Female PMHx of Afib (on Xarelto), h/o DVT/PE, s/p IVC filter, HTN, hypothyroidism, COPD/emphysema, ex-smoker (quit >16 years ago) who s/p mechanical fall sustained a right acetabular fracture and retroperitoneal hematoma     #Rt acetabular fracture  - pain per primary team - oxy IR 5mg q6hrs, dialudid 0.5mg q4hrs prn  - Ortho plans for possible fixation of acetabular fx    #Hematoma  -serial h/h  -transfuse <7  -holding home xarelto     #Afib  -currently rate controlled  -continue metoprolol  -recommend cards risk stratification for OR    #HTN  -continue home lisinopril and metoprolol  -Goal <140/90    Patient is currently medically optimized for surgery pending cardiology risk stratification

## 2023-11-30 NOTE — H&P ADULT - ASSESSMENT
73F w/ PMHx of Afib (on Xarelto), h/o DVT/PE, s/p IVC filter, HTN, hypothyroidism, COPD/emphysema, ex-smoker (quit >16 years ago) who presents as a TRAUMA ALERT (transfer from [facility]) s/p mechanical fall (-HT -LOC +AC). ABCs intact, GCS 15.     Injuries identified:   *Comminuted right acetabular fracture w/ iliopsoas hematoma*    PLAN:   - Admitted to surgery  - No plan for surgery today  - F/u ortho plans for possible fixation of acetabular fx  - Needs medicine pre-op clearance / risk stratification / medical optimization   - Trend Hgb   - Bed rest, NWB    - Reg diet   - Pain control  - DVT ppx     [id number]  Trauma Surgery 73F w/ PMHx of Afib (on Xarelto), h/o DVT/PE, s/p IVC filter, HTN, hypothyroidism, COPD/emphysema, ex-smoker (quit >16 years ago) who presents as a TRAUMA ALERT (transfer from [facility]) s/p mechanical fall (-HT -LOC +AC). ABCs intact, GCS 15.     Injuries identified:   *Comminuted right acetabular fracture w/ iliopsoas hematoma*    PLAN:   - Admitted to surgery  - No plan for surgery today  - F/U CTA pelvis - completed, pending read  - F/u ortho plans for possible fixation of acetabular fx  - Needs medicine pre-op clearance / risk stratification / medical optimization   - Trend Hgb   - Bed rest, NWB    - Reg diet   - Pain control  - DVT ppx     [phone]  Trauma Surgery 73F w/ PMHx of Afib (on Xarelto), h/o DVT/PE, s/p IVC filter, HTN, hypothyroidism, COPD/emphysema, ex-smoker (quit >16 years ago) who presents as a TRAUMA ALERT (transfer from [facility]) s/p mechanical fall (-HT -LOC +AC). ABCs intact, GCS 15.     Injuries identified:   *Comminuted right acetabular fracture w/ iliopsoas hematoma*    PLAN:   - Admitted to surgery  - No plan for surgery today  - F/U CTA pelvis - completed, pending read  - F/u ortho plans for possible fixation of acetabular fx  - Needs medicine pre-op clearance / risk stratification / medical optimization   - Trend Hgb   - Bed rest, NWB    - Reg diet   - Pain control  - DVT ppx   - Hold Xarelto     [phone]  Trauma Surgery

## 2023-11-30 NOTE — PATIENT PROFILE ADULT - FALL HARM RISK - HARM RISK INTERVENTIONS

## 2023-11-30 NOTE — CONSULT NOTE ADULT - SUBJECTIVE AND OBJECTIVE BOX
Outpt cardiologist: Denies seeing outpatient cardiologist     HPI:  TRAUMA ACTIVATION LEVEL: ALERT    MECHANISM OF INJURY:      [] Blunt	[] MVC	[X] Fall	[] Pedestrian Struck	[] Motorcycle accident         [] Penetrating	[] Gun Shot Wound 		[] Stab Wound    GCS: 	E: 4	V: 5	M: 6    73F w/ PMHx of Afib (on Xarelto), h/o DVT/PE, s/p IVC filter, HTN, hypothyroidism, COPD/emphysema, ex-smoker (quit >16 years ago) who presents as a TRAUMA ALERT (transfer from [facility]) s/p mechanical fall (-HT -LOC +AC). Patient reportedly slipped and fell while in the kitchen yesterday afternoon. Complaining of right hip pain and inability to ambulate. She is accompanied by her  and son. She does not use an assistive device at baseline. Of note, patient has a h/o x2 prior falls, resulting in left pubic ramus fracture that was treated nonoperatively. Denies pain elsewhere. Denies any numbness or tingling. Reports her last Xarelto dose was the evening prior to presentation ( PM).     Primary Survey:    A - airway intact  B - bilateral breath sounds and good chest rise  C - initial BP  BP: 132/69 (23 @ 23:10) *** , HR HR: 70 (23 @ 23:10) *** , palpable pulses in all extremities  D - GCS 15 on arrival  Exposure obtained    Frailty Score:   Fatigue: How much time during the previous 4 weeks did you feel tired?   All or most of the time [   ] Yes (1pt)    [ X ] No  (0pts)  Resistance: Do you have any difficulty walking up 10 steps alone without resting and without aids? [   ] Yes (1pt)    [ X ] No  (0pts)  Ambulation: Do you have any difficulty walking several hundred yards alone without aids? [   ] Yes (1pt)    [ X ] No  (0pts)  Illness: how many illnesses do you have out of list of 11 total? [  X ] 5 or more (1pt) [  ] < 5 (0pts)  Loss of weight: Have you had weight loss of 5% or more? [   ] Yes (1pt)    [  ] No  (0pts)    Total Score: 1    Score 1-2: Consult medical comangement  Score 3-4: Consult Geriatric service   Score 5: Consult Palliative service [phone]    Norman Balderrama G, Nicholas VERNON, Rose SCOTT, Saulo PEÑA. Frality: toward a clinical definition. J [employer] Med Dir Assoc. 2008; 9 (2): 71-72  Karina JE, Basilio TK, Kael DK. A simple frailty questionnaire (FRAIL) predicts outcomes in middle aged  Americans. J Nutr Health Aging. 2012; 16 (7): 601-608 (2023 03:18)      ---  cardio fellow additional notes:    73F w/ PMHx of Afib (on Xarelto), h/o DVT/PE, s/p IVC filter, HTN, hypothyroidism, COPD/emphysema, ex-smoker (quit >16 years ago) who presents as a TRAUMA ALERT (transfer from [facility]) s/p mechanical fall (-HT -LOC +AC). Patient reportedly slipped and fell while in the kitchen yesterday afternoon. Pt seen and examined at bedside. At the time of the event, pt denied any chest pain or SOB. Currently denies any chest pain or SOB. Of note patient says she has never been told of her hx of Afib and is on Xarelto for previous DVT/PE.  PAST MEDICAL & SURGICAL HISTORY  Atrial fibrillation    Hypothyroid    HTN (hypertension)    Hyperlipidemia    History of blood clotting disorder    S/P IVC filter        FAMILY HISTORY:  FAMILY HISTORY:      SOCIAL HISTORY:  Social History:  Ex-smoker (quit >16 years ago)  Lives with  (2023 03:18)      ALLERGIES:  No Known Allergies      MEDICATIONS:  acetaminophen     Tablet .. 650 milliGRAM(s) Oral every 6 hours  atorvastatin 10 milliGRAM(s) Oral at bedtime  budesonide 160 MICROgram(s)/formoterol 4.5 MICROgram(s) Inhaler 2 Puff(s) Inhalation two times a day  chlorhexidine 2% Cloths 1 Application(s) Topical daily  cyanocobalamin 1000 MICROGram(s) Oral daily  folic acid 1 milliGRAM(s) Oral daily  lactated ringers. 1000 milliLiter(s) (50 mL/Hr) IV Continuous <Continuous>  levothyroxine 50 MICROGram(s) Oral daily  levothyroxine 75 MICROGram(s) Oral daily  metoprolol tartrate 25 milliGRAM(s) Oral every 12 hours  senna 2 Tablet(s) Oral at bedtime  tiotropium 2.5 MICROgram(s) Inhaler 2 Puff(s) Inhalation daily    PRN:  oxyCODONE    IR 2.5 milliGRAM(s) Oral every 4 hours PRN  oxyCODONE    IR 5 milliGRAM(s) Oral every 6 hours PRN      HOME MEDICATIONS:  Home Medications:  fluticasone-salmeterol 500 mcg-50 mcg inhalation powder: 1 powder inhaled once a day (2023 11:22)  folic acid 1 mg oral tablet: 1 tablet orally once a day (2023 11:22)  levothyroxine 50 mcg (0.05 mg) oral capsule: 1 capsule orally Tuesday, Thursday,  (2023 11:28)  Lipitor 10 mg oral tablet: 1 tablet orally once a day (at bedtime) (2023 11:22)  METOPROL TAR 50MG TAB: 1 cap(s) orally once a day (2023 01:07)  oxycodone-acetaminophen 5 mg-325 mg oral tablet: 2 tab(s) orally every 6 hours As needed Severe Pain (7 - 10) (2023 11:28)  Spiriva 18 mcg inhalation capsule: 1 cap(s) inhaled once a day (2023 04:23)  Synthroid 75 mcg (0.075 mg) oral tablet: 1 tablet orally Monday, Wednesday, and Friday M W F (2023 11:28)  Vitamin B12 1000 mcg oral tablet: 1 tab(s) orally once a day (10 Apr 2023 17:43)  XARELTO 20MG TAB: With dinner (2023 11:22)  Zestril 10 mg oral tablet: 1 tablet orally 2 times a day (2023 01:07)      VITALS:   T(F): 97 (11-30 @ 12:00), Max: 97.6 (11-29 @ 19:52)  HR: 61 (11-30 @ 12:00) (57 - 76)  BP: 161/67 (11-30 @ 12:00) (78/52 - 161/67)  BP(mean): 96 (11-30 @ 12:00) (80 - 96)  RR: 16 (11-30 @ 12:00) (16 - 20)  SpO2: 97% ( @ 12:00) (92% - 100%)    I&O's Summary    2023 07:01  -  2023 13:21  --------------------------------------------------------  IN: 100 mL / OUT: 150 mL / NET: -50 mL        REVIEW OF SYSTEMS:  CONSTITUTIONAL: No weakness, fevers or chills  HEENT: No visual changes, neck/ear pain  RESPIRATORY: No cough, dysnpea on exertion  CARDIOVASCULAR: See HPI  GASTROINTESTINAL: No abdominal pain. No nausea, vomiting, diarrhea   GENITOURINARY: No dysuria, frequency or hematuria  NEUROLOGICAL: No new focal deficits  SKIN: No new rashes    PHYSICAL EXAM:  General: Not in distress.  Non-toxic appearing.   HEENT: EOMI  Cardio: regular, S1, S2, no murmur  Pulm: B/L Wheezes.   Abdomen: Soft, non-tender, non-distended. Normoactive bowel sounds  Extremities: Abrasion to RUE and R. knee  Neuro: A&O x3. No focal deficits    LABS:                        10.2   9.99  )-----------( 329      ( 2023 08:24 )             30.9     1130    137  |  105  |  20  ----------------------------<  100<H>  4.3   |  20  |  0.7    Ca    8.8      2023 08:23  Phos  4.3       Mg     1.7         TPro  5.4<L>  /  Alb  3.6  /  TBili  0.3  /  DBili  x   /  AST  19  /  ALT  11  /  AlkPhos  58      PT/INR - ( 2023 08:23 )   PT: 17.80 sec;   INR: 1.55 ratio         PTT - ( 2023 08:23 )  PTT:37.8 sec  Troponin T, Serum: <0.01 ng/mL (23 @ 20:28)    CARDIAC MARKERS ( 2023 20:28 )  x     / <0.01 ng/mL / x     / x     / x            Troponin trend:            RADIOLOGY:  -CXR: No evidence of cardiopulmonary diseas  -TTE: denies  -CCTA: denies  -STRESS TEST: denies  -CATHETERIZATION: denies  -OTHER:  EC Lead ECG:   Ventricular Rate 60 BPM    Atrial Rate 60 BPM    P-R Interval 186 ms    QRS Duration 88 ms    Q-T Interval 452 ms    QTC Calculation(Bazett) 452 ms    P Axis 83 degrees    R Axis 69 degrees    T Axis 58 degrees    Diagnosis Line Normal sinus rhythm  Normal ECG    Confirmed by ANGELA AGUILAR MD ([id number]) on 2023 7:09:24 AM ( @ 20:55)      TELEMETRY EVENTS:     HPI:    73F w/ PMHx of Afib (on Xarelto), h/o DVT/PE, s/p IVC filter, HTN, hypothyroidism, COPD/emphysema, ex-smoker (quit >16 years ago) who presents as a TRAUMA ALERT (transfer from [facility]) s/p mechanical fall (-HT -LOC +AC). Patient reportedly slipped and fell while in the kitchen yesterday afternoon. Complaining of right hip pain and inability to ambulate. She is accompanied by her  and son. She does not use an assistive device at baseline. Of note, patient has a h/o x2 prior falls, resulting in left pubic ramus fracture that was treated nonoperatively. Denies pain elsewhere. Denies any numbness or tingling. Reports her last Xarelto dose was the evening prior to presentation ( PM).   ---  cardio fellow additional notes:    73F w/ PMHx of Afib (on Xarelto), h/o DVT/PE, s/p IVC filter, HTN, hypothyroidism, COPD/emphysema, ex-smoker (quit >16 years ago) who presents as a TRAUMA ALERT (transfer from [facility]) s/p mechanical fall (-HT -LOC +AC). Patient reportedly slipped and fell while in the kitchen yesterday afternoon. Pt seen and examined at bedside. At the time of the event, pt denied any chest pain or SOB. Currently denies any chest pain or SOB. Of note patient says she has never been told of her hx of Afib and is on Xarelto for previous DVT/PE.    PAST MEDICAL & SURGICAL HISTORY  Atrial fibrillation    Hypothyroid    HTN (hypertension)    Hyperlipidemia    History of blood clotting disorder    S/P IVC filter        FAMILY HISTORY:  FAMILY HISTORY:      SOCIAL HISTORY:  Social History:  Ex-smoker (quit >16 years ago)  Lives with  (2023 03:18)      ALLERGIES:  No Known Allergies      MEDICATIONS:  acetaminophen     Tablet .. 650 milliGRAM(s) Oral every 6 hours  atorvastatin 10 milliGRAM(s) Oral at bedtime  budesonide 160 MICROgram(s)/formoterol 4.5 MICROgram(s) Inhaler 2 Puff(s) Inhalation two times a day  chlorhexidine 2% Cloths 1 Application(s) Topical daily  cyanocobalamin 1000 MICROGram(s) Oral daily  folic acid 1 milliGRAM(s) Oral daily  lactated ringers. 1000 milliLiter(s) (50 mL/Hr) IV Continuous <Continuous>  levothyroxine 50 MICROGram(s) Oral daily  levothyroxine 75 MICROGram(s) Oral daily  metoprolol tartrate 25 milliGRAM(s) Oral every 12 hours  senna 2 Tablet(s) Oral at bedtime  tiotropium 2.5 MICROgram(s) Inhaler 2 Puff(s) Inhalation daily    PRN:  oxyCODONE    IR 2.5 milliGRAM(s) Oral every 4 hours PRN  oxyCODONE    IR 5 milliGRAM(s) Oral every 6 hours PRN      HOME MEDICATIONS:  Home Medications:  fluticasone-salmeterol 500 mcg-50 mcg inhalation powder: 1 powder inhaled once a day (2023 11:22)  folic acid 1 mg oral tablet: 1 tablet orally once a day (2023 11:22)  levothyroxine 50 mcg (0.05 mg) oral capsule: 1 capsule orally Tuesday, Thursday,  (2023 11:28)  Lipitor 10 mg oral tablet: 1 tablet orally once a day (at bedtime) (2023 11:22)  METOPROL TAR 50MG TAB: 1 cap(s) orally once a day (2023 01:07)  oxycodone-acetaminophen 5 mg-325 mg oral tablet: 2 tab(s) orally every 6 hours As needed Severe Pain (7 - 10) (2023 11:28)  Spiriva 18 mcg inhalation capsule: 1 cap(s) inhaled once a day (2023 04:23)  Synthroid 75 mcg (0.075 mg) oral tablet: 1 tablet orally Monday, Wednesday, and Friday M W F (2023 11:28)  Vitamin B12 1000 mcg oral tablet: 1 tab(s) orally once a day (10 Apr 2023 17:43)  XARELTO 20MG TAB: With dinner (2023 11:22)  Zestril 10 mg oral tablet: 1 tablet orally 2 times a day (2023 01:07)      VITALS:   T(F): 97 (- @ 12:00), Max: 97.6 ( @ 19:52)  HR: 61 (11-30 @ 12:00) (57 - 76)  BP: 161/67 (11-30 @ 12:00) (78/52 - 161/67)  BP(mean): 96 (- @ 12:00) (80 - 96)  RR: 16 ( @ 12:00) (16 - 20)  SpO2: 97% ( @ 12:00) (92% - 100%)    I&O's Summary    2023 07:01  -  2023 13:21  --------------------------------------------------------  IN: 100 mL / OUT: 150 mL / NET: -50 mL        REVIEW OF SYSTEMS:  CONSTITUTIONAL: No weakness, fevers or chills  HEENT: No visual changes, neck/ear pain  RESPIRATORY: No cough, dysnpea on exertion  CARDIOVASCULAR: See HPI  GASTROINTESTINAL: No abdominal pain. No nausea, vomiting, diarrhea   GENITOURINARY: No dysuria, frequency or hematuria  NEUROLOGICAL: No new focal deficits  SKIN: No new rashes    PHYSICAL EXAM:  General: Not in distress.  Non-toxic appearing.   HEENT: EOMI  Cardio: regular, S1, S2, no murmur  Pulm: B/L Wheezes.   Abdomen: Soft, non-tender, non-distended. Normoactive bowel sounds  Extremities: Abrasion to RUE and R. knee  Neuro: A&O x3. No focal deficits    LABS:                        10.2   9.99  )-----------( 329      ( 2023 08:24 )             30.9         137  |  105  |  20  ----------------------------<  100<H>  4.3   |  20  |  0.7    Ca    8.8      2023 08:23  Phos  4.3       Mg     1.7         TPro  5.4<L>  /  Alb  3.6  /  TBili  0.3  /  DBili  x   /  AST  19  /  ALT  11  /  AlkPhos  58      PT/INR - ( 2023 08:23 )   PT: 17.80 sec;   INR: 1.55 ratio         PTT - ( 2023 08:23 )  PTT:37.8 sec  Troponin T, Serum: <0.01 ng/mL (23 @ 20:28)    CARDIAC MARKERS ( 2023 20:28 )  x     / <0.01 ng/mL / x     / x     / x            Troponin trend:            RADIOLOGY:  -CXR: No evidence of cardiopulmonary diseas    EC Lead ECG:   Ventricular Rate 60 BPM    Atrial Rate 60 BPM    P-R Interval 186 ms    QRS Duration 88 ms    Q-T Interval 452 ms    QTC Calculation(Bazett) 452 ms    P Axis 83 degrees    R Axis 69 degrees    T Axis 58 degrees    Diagnosis Line Normal sinus rhythm  Normal ECG    Confirmed by ANGELA AGUILAR MD ([id number]) on 2023 7:09:24 AM ( @ 20:55)      TELEMETRY EVENTS:     HPI:    73F w/ PMHx of Afib (on Xarelto), h/o DVT/PE, s/p IVC filter, HTN, hypothyroidism, COPD/emphysema, ex-smoker (quit >16 years ago) who presents as a TRAUMA ALERT (transfer from [facility]) s/p mechanical fall (-HT -LOC +AC). Patient reportedly slipped and fell while in the kitchen yesterday afternoon. Complaining of right hip pain and inability to ambulate. She is accompanied by her  and son. She does not use an assistive device at baseline. Of note, patient has a h/o x2 prior falls, resulting in left pubic ramus fracture that was treated nonoperatively. Denies pain elsewhere. Denies any numbness or tingling. Reports her last Xarelto dose was the evening prior to presentation ( PM).   ---  cardio fellow additional notes:    73F w/ PMHx of Afib (on Xarelto), h/o DVT/PE, s/p IVC filter, HTN, hypothyroidism, COPD/emphysema, ex-smoker (quit >16 years ago) who presents as a TRAUMA ALERT (transfer from [facility]) s/p mechanical fall (-HT -LOC +AC). Patient reportedly slipped and fell while in the kitchen yesterday afternoon. Pt seen and examined at bedside. At the time of the event, pt denied any chest pain or SOB. Currently denies any chest pain or SOB. Of note patient says she has never been told of her hx of Afib and is on Xarelto for previous DVT/PE.    PAST MEDICAL & SURGICAL HISTORY  Atrial fibrillation    Hypothyroid    HTN (hypertension)    Hyperlipidemia    History of blood clotting disorder    S/P IVC filter        FAMILY HISTORY:  FAMILY HISTORY:      SOCIAL HISTORY:  Social History:  Ex-smoker (quit >16 years ago)  Lives with  (2023 03:18)      ALLERGIES:  No Known Allergies      MEDICATIONS:  acetaminophen     Tablet .. 650 milliGRAM(s) Oral every 6 hours  atorvastatin 10 milliGRAM(s) Oral at bedtime  budesonide 160 MICROgram(s)/formoterol 4.5 MICROgram(s) Inhaler 2 Puff(s) Inhalation two times a day  chlorhexidine 2% Cloths 1 Application(s) Topical daily  cyanocobalamin 1000 MICROGram(s) Oral daily  folic acid 1 milliGRAM(s) Oral daily  lactated ringers. 1000 milliLiter(s) (50 mL/Hr) IV Continuous <Continuous>  levothyroxine 50 MICROGram(s) Oral daily  levothyroxine 75 MICROGram(s) Oral daily  metoprolol tartrate 25 milliGRAM(s) Oral every 12 hours  senna 2 Tablet(s) Oral at bedtime  tiotropium 2.5 MICROgram(s) Inhaler 2 Puff(s) Inhalation daily    PRN:  oxyCODONE    IR 2.5 milliGRAM(s) Oral every 4 hours PRN  oxyCODONE    IR 5 milliGRAM(s) Oral every 6 hours PRN      HOME MEDICATIONS:  Home Medications:  fluticasone-salmeterol 500 mcg-50 mcg inhalation powder: 1 powder inhaled once a day (2023 11:22)  folic acid 1 mg oral tablet: 1 tablet orally once a day (2023 11:22)  levothyroxine 50 mcg (0.05 mg) oral capsule: 1 capsule orally Tuesday, Thursday,  (2023 11:28)  Lipitor 10 mg oral tablet: 1 tablet orally once a day (at bedtime) (2023 11:22)  METOPROL TAR 50MG TAB: 1 cap(s) orally once a day (2023 01:07)  oxycodone-acetaminophen 5 mg-325 mg oral tablet: 2 tab(s) orally every 6 hours As needed Severe Pain (7 - 10) (2023 11:28)  Spiriva 18 mcg inhalation capsule: 1 cap(s) inhaled once a day (2023 04:23)  Synthroid 75 mcg (0.075 mg) oral tablet: 1 tablet orally Monday, Wednesday, and Friday M W F (2023 11:28)  Vitamin B12 1000 mcg oral tablet: 1 tab(s) orally once a day (10 Apr 2023 17:43)  XARELTO 20MG TAB: With dinner (2023 11:22)  Zestril 10 mg oral tablet: 1 tablet orally 2 times a day (2023 01:07)      VITALS:   T(F): 97 (- @ 12:00), Max: 97.6 ( @ 19:52)  HR: 61 (11-30 @ 12:00) (57 - 76)  BP: 161/67 (11-30 @ 12:00) (78/52 - 161/67)  BP(mean): 96 ( @ 12:00) (80 - 96)  RR: 16 ( @ 12:00) (16 - 20)  SpO2: 97% ( @ 12:00) (92% - 100%)    I&O's Summary    2023 07:01  -  2023 13:21  --------------------------------------------------------  IN: 100 mL / OUT: 150 mL / NET: -50 mL        REVIEW OF SYSTEMS:  CONSTITUTIONAL: No weakness, fevers or chills  HEENT: No visual changes, neck/ear pain  RESPIRATORY: No cough, dysnpea on exertion  CARDIOVASCULAR: no chest pain or shortness of breath   GASTROINTESTINAL: No abdominal pain. No nausea, vomiting, diarrhea   GENITOURINARY: No dysuria, frequency or hematuria  SKIN: No new rashes    PHYSICAL EXAM:  General: Not in distress.  Non-toxic appearing.   HEENT: EOMI  Cardio: regular, S1, S2, no murmur  Pulm: B/L Wheezes.   Abdomen: Soft, non-tender, non-distended. Normoactive bowel sounds  Extremities: Abrasion to RUE and R. knee  Neuro: A&O x3. No focal deficits    LABS:                        10.2   9.99  )-----------( 329      ( 2023 08:24 )             30.9         137  |  105  |  20  ----------------------------<  100<H>  4.3   |  20  |  0.7    Ca    8.8      2023 08:23  Phos  4.3       Mg     1.7         TPro  5.4<L>  /  Alb  3.6  /  TBili  0.3  /  DBili  x   /  AST  19  /  ALT  11  /  AlkPhos  58      PT/INR - ( 2023 08:23 )   PT: 17.80 sec;   INR: 1.55 ratio         PTT - ( 2023 08:23 )  PTT:37.8 sec  Troponin T, Serum: <0.01 ng/mL (23 @ 20:28)    CARDIAC MARKERS ( 2023 20:28 )  x     / <0.01 ng/mL / x     / x     / x            Troponin trend:            RADIOLOGY:  -CXR: No evidence of cardiopulmonary diseas    EC Lead ECG:   Ventricular Rate 60 BPM    Atrial Rate 60 BPM    P-R Interval 186 ms    QRS Duration 88 ms    Q-T Interval 452 ms    QTC Calculation(Bazett) 452 ms    P Axis 83 degrees    R Axis 69 degrees    T Axis 58 degrees    Diagnosis Line Normal sinus rhythm  Normal ECG    Confirmed by ANGELA AGUILAR MD ([id number]) on 2023 7:09:24 AM ( @ 20:55)      TELEMETRY EVENTS:

## 2023-11-30 NOTE — PROGRESS NOTE ADULT - SUBJECTIVE AND OBJECTIVE BOX
Patient is a 73y old  Female who presents with a chief complaint of S/P mechanical fall (-HT, -LOC, +AC) (30 Nov 2023 13:20)      Patient seen and examined at bedside.  Patient denies any chest pain or shortness of breath.    ALLERGIES:  No Known Allergies    MEDICATIONS:  acetaminophen     Tablet .. 650 milliGRAM(s) Oral every 6 hours  atorvastatin 10 milliGRAM(s) Oral at bedtime  budesonide 160 MICROgram(s)/formoterol 4.5 MICROgram(s) Inhaler 2 Puff(s) Inhalation two times a day  chlorhexidine 2% Cloths 1 Application(s) Topical daily  cyanocobalamin 1000 MICROGram(s) Oral daily  enoxaparin Injectable 40 milliGRAM(s) SubCutaneous every 24 hours  folic acid 1 milliGRAM(s) Oral daily  levothyroxine 50 MICROGram(s) Oral daily  levothyroxine 75 MICROGram(s) Oral daily  metoprolol tartrate 25 milliGRAM(s) Oral every 12 hours  oxyCODONE    IR 2.5 milliGRAM(s) Oral every 4 hours PRN  oxyCODONE    IR 5 milliGRAM(s) Oral every 6 hours PRN  senna 2 Tablet(s) Oral at bedtime  tiotropium 2.5 MICROgram(s) Inhaler 2 Puff(s) Inhalation daily    Vital Signs Last 24 Hrs  T(F): 96.9 (30 Nov 2023 16:00), Max: 97.6 (29 Nov 2023 19:52)  HR: 77 (30 Nov 2023 17:00) (57 - 77)  BP: 130/68 (30 Nov 2023 17:00) (78/52 - 176/77)  RR: 16 (30 Nov 2023 17:00) (16 - 20)  SpO2: 93% (30 Nov 2023 17:00) (92% - 100%)  I&O's Summary    30 Nov 2023 07:01  -  30 Nov 2023 18:16  --------------------------------------------------------  IN: 350 mL / OUT: 350 mL / NET: 0 mL        PHYSICAL EXAM:  General: NAD, Alert   ENT: MMM  Neck: Supple, No JVD  Lungs: diminshed bilaterally  Cardio: RRR, S1/S2, 2/6 systolic murmur   Abdomen: Soft, Nontender, Nondistended; Bowel sounds present  Extremities: No cyanosis, right leg edema     LABS:                        10.0   8.70  )-----------( 305      ( 30 Nov 2023 13:33 )             30.4     11-30    137  |  105  |  20  ----------------------------<  100  4.3   |  20  |  0.7    Ca    8.8      30 Nov 2023 08:23  Phos  4.3     11-30  Mg     1.7     11-30    TPro  5.4  /  Alb  3.6  /  TBili  0.3  /  DBili  x   /  AST  19  /  ALT  11  /  AlkPhos  58  11-29      PT/INR - ( 30 Nov 2023 08:23 )   PT: 17.80 sec;   INR: 1.55 ratio         PTT - ( 30 Nov 2023 08:23 )  PTT:37.8 sec                      POCT Blood Glucose.: 96 mg/dL (30 Nov 2023 12:03)  POCT Blood Glucose.: 98 mg/dL (30 Nov 2023 00:08)      Urinalysis Basic - ( 30 Nov 2023 08:23 )    Color: x / Appearance: x / SG: x / pH: x  Gluc: 100 mg/dL / Ketone: x  / Bili: x / Urobili: x   Blood: x / Protein: x / Nitrite: x   Leuk Esterase: x / RBC: x / WBC x   Sq Epi: x / Non Sq Epi: x / Bacteria: x            RADIOLOGY & ADDITIONAL TESTS:    Care Discussed with Consultants/Other Providers:

## 2023-11-30 NOTE — CONSULT NOTE ADULT - SUBJECTIVE AND OBJECTIVE BOX
Pulmonary Consult    HPI:  72 y/o female with PMH of DVT on eliquis s/p IVC filter, hypothyroidism, COPD not on home O2, HTN, HLD presented for mechanical fall sustained Comminuted right acetabular fracture w/ iliopsoas hematoma now planned for repair.   PAST MEDICAL & SURGICAL HISTORY:  Atrial fibrillation      Hypothyroid      HTN (hypertension)      Hyperlipidemia      History of blood clotting disorder      S/P IVC filter            FAMILY HISTORY:      SOCIAL HISTORY:    No smoking no drug or alcohol abuse .     Allergies    No Known Allergies    Intolerances              REVIEW OF SYSTEMS:  Constitutional: No fevers or chills or weight loss.   Eyes: No itching or discharge from the eyes  ENT:  No post nasal drip. No epistaxis. No throat pain. . No difficulty swallowing.   CV: No chest pain. No palpitations.  or dizziness.   Resp: persistent cough from post nasal drip not more frequent now then usssual.   GI: No nausea. No vomiting. No diarrhea or abdominal pain   MSK: No joint pain or pain in any extremities  Integumentary: No skin lesions. No pedal edema.  Neurological: No gross motor weakness. No sensory changes.      OBJECTIVE:        PHYSICAL EXAM:  General: Awake, alert, oriented X 3.   HEENT: Atraumatic, normocephalic.   Neck: No JVD no lymphadenopathy   Respiratory: normal vesicular breathing with no wheeze or rales on the exam . On 2 L nc resting conformably   Cardiovascular: S1 S2 normal. No murmurs, rubs or gallops.   Abdomen: Soft, non-tender, non-distended. No organomegaly.  Extremities: Warm to touch. Peripheral pulse palpable. No pedal edema.   Skin: No rashes or skin lesions  Neurological: Motor and sensory examination equal and normal in all four extremities.  Psychiatry: Appropriate mood and affect.    HOSPITAL MEDICATIONS:  MEDICATIONS  (STANDING):  acetaminophen     Tablet .. 650 milliGRAM(s) Oral every 6 hours  atorvastatin 10 milliGRAM(s) Oral at bedtime  budesonide 160 MICROgram(s)/formoterol 4.5 MICROgram(s) Inhaler 2 Puff(s) Inhalation two times a day  chlorhexidine 2% Cloths 1 Application(s) Topical daily  cyanocobalamin 1000 MICROGram(s) Oral daily  folic acid 1 milliGRAM(s) Oral daily  lactated ringers. 1000 milliLiter(s) (50 mL/Hr) IV Continuous <Continuous>  levothyroxine 75 MICROGram(s) Oral daily  levothyroxine 50 MICROGram(s) Oral daily  magnesium sulfate  IVPB 2 Gram(s) IV Intermittent once  metoprolol tartrate 25 milliGRAM(s) Oral every 12 hours  senna 2 Tablet(s) Oral at bedtime  tiotropium 2.5 MICROgram(s) Inhaler 2 Puff(s) Inhalation daily    MEDICATIONS  (PRN):  oxyCODONE    IR 2.5 milliGRAM(s) Oral every 4 hours PRN Moderate Pain (4 - 6)  oxyCODONE    IR 5 milliGRAM(s) Oral every 6 hours PRN Severe Pain (7 - 10)      LABS:                        10.2   9.99  )-----------( 329      ( 30 Nov 2023 08:24 )             30.9         137  |  105  |  20  ----------------------------<  100<H>  4.3   |  20  |  0.7    Ca    8.8      30 Nov 2023 08:23  Phos  4.3     11-30  Mg     1.7     11-30    TPro  5.4<L>  /  Alb  3.6  /  TBili  0.3  /  DBili  x   /  AST  19  /  ALT  11  /  AlkPhos  58  11-29    PT/INR - ( 30 Nov 2023 08:23 )   PT: 17.80 sec;   INR: 1.55 ratio         PTT - ( 30 Nov 2023 08:23 )  PTT:37.8 sec  Urinalysis Basic - ( 30 Nov 2023 08:23 )    Color: x / Appearance: x / SG: x / pH: x  Gluc: 100 mg/dL / Ketone: x  / Bili: x / Urobili: x   Blood: x / Protein: x / Nitrite: x   Leuk Esterase: x / RBC: x / WBC x   Sq Epi: x / Non Sq Epi: x / Bacteria: x        #Ho COPD  #preoperative evaluation for planned Comminuted right acetabular fracture repair   -Not on home o2. Wean o2 keep saturation 88-92  -continue home spiriva and ics/laba for maintenance   -cxr reviewed, hyperinflation without effusions or opacities  -patient resting well   -ASA class 3 ARISCAT Score 27 13.3% (intermediate) risk of in-hospital post-op pulmonary complications (composite including respiratory failure, respiratory infection, pleural effusion, atelectasis, pneumothorax, bronchospasm, aspiration pneumonitis)  -post extubation abg and bipap if necessary      ***resident's incomplete note***   Pulmonary Consult    HPI:  74 y/o female with PMH of DVT on eliquis s/p IVC filter, hypothyroidism, COPD not on home O2, HTN, HLD presented for mechanical fall sustained Comminuted right acetabular fracture w/ iliopsoas hematoma now planned for repair. 50 pkyr smoking hx quit 16 years prior follows with Dr. Cooper   PAST MEDICAL & SURGICAL HISTORY:  Atrial fibrillation      Hypothyroid      HTN (hypertension)      Hyperlipidemia      History of blood clotting disorder      S/P IVC filter            FAMILY HISTORY:      SOCIAL HISTORY:    No smoking no drug or alcohol abuse .     Allergies    No Known Allergies    Intolerances              REVIEW OF SYSTEMS:  Constitutional: No fevers or chills or weight loss.   Eyes: No itching or discharge from the eyes  ENT:  No post nasal drip. No epistaxis. No throat pain. . No difficulty swallowing.   CV: No chest pain. No palpitations.  or dizziness.   Resp: persistent cough from post nasal drip not more frequent now then usssual.   GI: No nausea. No vomiting. No diarrhea or abdominal pain   MSK: No joint pain or pain in any extremities  Integumentary: No skin lesions. No pedal edema.  Neurological: No gross motor weakness. No sensory changes.      OBJECTIVE:        PHYSICAL EXAM:  General: Awake, alert, oriented X 3.   HEENT: Atraumatic, normocephalic.   Neck: No JVD no lymphadenopathy   Respiratory: normal vesicular breathing with no wheeze or rales on the exam . On 2 L nc resting conformably   Cardiovascular: S1 S2 normal. No murmurs, rubs or gallops.   Abdomen: Soft, non-tender, non-distended. No organomegaly.  Extremities: Warm to touch. Peripheral pulse palpable. No pedal edema.   Skin: No rashes or skin lesions  Neurological: Motor and sensory examination equal and normal in all four extremities.  Psychiatry: Appropriate mood and affect.    HOSPITAL MEDICATIONS:  MEDICATIONS  (STANDING):  acetaminophen     Tablet .. 650 milliGRAM(s) Oral every 6 hours  atorvastatin 10 milliGRAM(s) Oral at bedtime  budesonide 160 MICROgram(s)/formoterol 4.5 MICROgram(s) Inhaler 2 Puff(s) Inhalation two times a day  chlorhexidine 2% Cloths 1 Application(s) Topical daily  cyanocobalamin 1000 MICROGram(s) Oral daily  folic acid 1 milliGRAM(s) Oral daily  lactated ringers. 1000 milliLiter(s) (50 mL/Hr) IV Continuous <Continuous>  levothyroxine 75 MICROGram(s) Oral daily  levothyroxine 50 MICROGram(s) Oral daily  magnesium sulfate  IVPB 2 Gram(s) IV Intermittent once  metoprolol tartrate 25 milliGRAM(s) Oral every 12 hours  senna 2 Tablet(s) Oral at bedtime  tiotropium 2.5 MICROgram(s) Inhaler 2 Puff(s) Inhalation daily    MEDICATIONS  (PRN):  oxyCODONE    IR 2.5 milliGRAM(s) Oral every 4 hours PRN Moderate Pain (4 - 6)  oxyCODONE    IR 5 milliGRAM(s) Oral every 6 hours PRN Severe Pain (7 - 10)      LABS:                        10.2   9.99  )-----------( 329      ( 30 Nov 2023 08:24 )             30.9         137  |  105  |  20  ----------------------------<  100<H>  4.3   |  20  |  0.7    Ca    8.8      30 Nov 2023 08:23  Phos  4.3     11-30  Mg     1.7     11-30    TPro  5.4<L>  /  Alb  3.6  /  TBili  0.3  /  DBili  x   /  AST  19  /  ALT  11  /  AlkPhos  58  11-29    PT/INR - ( 30 Nov 2023 08:23 )   PT: 17.80 sec;   INR: 1.55 ratio         PTT - ( 30 Nov 2023 08:23 )  PTT:37.8 sec  Urinalysis Basic - ( 30 Nov 2023 08:23 )    Color: x / Appearance: x / SG: x / pH: x  Gluc: 100 mg/dL / Ketone: x  / Bili: x / Urobili: x   Blood: x / Protein: x / Nitrite: x   Leuk Esterase: x / RBC: x / WBC x   Sq Epi: x / Non Sq Epi: x / Bacteria: x        #Ho COPD  #preoperative evaluation for planned Comminuted right acetabular fracture repair   -Not on home o2. Wean o2 keep saturation 88-92  -continue home spiriva and ics/laba for maintenance   -cxr reviewed, hyperinflation without effusions or opacities  -patient resting well   -ASA class 3 ARISCAT Score 27 13.3% (intermediate) risk of in-hospital post-op pulmonary complications (composite including respiratory failure, respiratory infection, pleural effusion, atelectasis, pneumothorax, bronchospasm, aspiration pneumonitis)  -post extubation abg and bipap if necessary      ***resident's incomplete note***   Pulmonary Consult    HPI:  74 y/o female with PMH of DVT on eliquis s/p IVC filter, hypothyroidism, COPD not on home O2, HTN, HLD presented for mechanical fall sustained Comminuted right acetabular fracture w/ iliopsoas hematoma now planned for repair. 50 pkyr smoking hx quit 16 years prior follows with Dr. Cooper   PAST MEDICAL & SURGICAL HISTORY:  Atrial fibrillation      Hypothyroid      HTN (hypertension)      Hyperlipidemia      History of blood clotting disorder      S/P IVC filter            FAMILY HISTORY:      SOCIAL HISTORY:    No smoking no drug or alcohol abuse .     Allergies    No Known Allergies    Intolerances              REVIEW OF SYSTEMS:  Constitutional: No fevers or chills or weight loss.   Eyes: No itching or discharge from the eyes  ENT:  No post nasal drip. No epistaxis. No throat pain. . No difficulty swallowing.   CV: No chest pain. No palpitations.  or dizziness.   Resp: persistent cough from post nasal drip not more frequent now then usssual.   GI: No nausea. No vomiting. No diarrhea or abdominal pain   MSK: No joint pain or pain in any extremities  Integumentary: No skin lesions. No pedal edema.  Neurological: No gross motor weakness. No sensory changes.      OBJECTIVE:        PHYSICAL EXAM:  General: Awake, alert, oriented X 3.   HEENT: Atraumatic, normocephalic.   Neck: No JVD no lymphadenopathy   Respiratory: normal vesicular breathing with no wheeze or rales on the exam . On 2 L nc resting conformably   Cardiovascular: S1 S2 normal. No murmurs, rubs or gallops.   Abdomen: Soft, non-tender, non-distended. No organomegaly.  Extremities: Warm to touch. Peripheral pulse palpable. No pedal edema.   Skin: No rashes or skin lesions  Neurological: Motor and sensory examination equal and normal in all four extremities.  Psychiatry: Appropriate mood and affect.    HOSPITAL MEDICATIONS:  MEDICATIONS  (STANDING):  acetaminophen     Tablet .. 650 milliGRAM(s) Oral every 6 hours  atorvastatin 10 milliGRAM(s) Oral at bedtime  budesonide 160 MICROgram(s)/formoterol 4.5 MICROgram(s) Inhaler 2 Puff(s) Inhalation two times a day  chlorhexidine 2% Cloths 1 Application(s) Topical daily  cyanocobalamin 1000 MICROGram(s) Oral daily  folic acid 1 milliGRAM(s) Oral daily  lactated ringers. 1000 milliLiter(s) (50 mL/Hr) IV Continuous <Continuous>  levothyroxine 75 MICROGram(s) Oral daily  levothyroxine 50 MICROGram(s) Oral daily  magnesium sulfate  IVPB 2 Gram(s) IV Intermittent once  metoprolol tartrate 25 milliGRAM(s) Oral every 12 hours  senna 2 Tablet(s) Oral at bedtime  tiotropium 2.5 MICROgram(s) Inhaler 2 Puff(s) Inhalation daily    MEDICATIONS  (PRN):  oxyCODONE    IR 2.5 milliGRAM(s) Oral every 4 hours PRN Moderate Pain (4 - 6)  oxyCODONE    IR 5 milliGRAM(s) Oral every 6 hours PRN Severe Pain (7 - 10)      LABS:                        10.2   9.99  )-----------( 329      ( 30 Nov 2023 08:24 )             30.9         137  |  105  |  20  ----------------------------<  100<H>  4.3   |  20  |  0.7    Ca    8.8      30 Nov 2023 08:23  Phos  4.3     11-30  Mg     1.7     11-30    TPro  5.4<L>  /  Alb  3.6  /  TBili  0.3  /  DBili  x   /  AST  19  /  ALT  11  /  AlkPhos  58  11-29    PT/INR - ( 30 Nov 2023 08:23 )   PT: 17.80 sec;   INR: 1.55 ratio         PTT - ( 30 Nov 2023 08:23 )  PTT:37.8 sec  Urinalysis Basic - ( 30 Nov 2023 08:23 )    Color: x / Appearance: x / SG: x / pH: x  Gluc: 100 mg/dL / Ketone: x  / Bili: x / Urobili: x   Blood: x / Protein: x / Nitrite: x   Leuk Esterase: x / RBC: x / WBC x   Sq Epi: x / Non Sq Epi: x / Bacteria: x

## 2023-11-30 NOTE — CONSULT NOTE ADULT - SUBJECTIVE AND OBJECTIVE BOX
ORTHOPAEDIC SURGERY CONSULT NOTE    Reason for Consult: R acetabulum fracture    HPI: 73yFemale PMH of Afib on xarelto, DVT s/p IVC filter, COPD, HTN, HLD, and hypothyroidism who presents as trasnfer from [facility] ED with pain in right hip and inability to ambulate s/p mechanical fall at home. Accompanied by  and son at bedside. Patient reports she slipped and fell while in the kitchen early evening. Lives with . Does not use an assistive device at baseline. Patient denies head trauma or LOC. Denies pain elsewhere. Denies paresthesias. Currently takes Xarelto, last dose yesterday morning. Of note, patient has history of two prior falls when she suffered a left pubic ramus fracture that was treated nonoperatively with weight bearing as tolerated.    In [facility] ED, patient was found to be hemodynamically unstable with systolic BP of 70s. Received 1u PRBC and prothrombin. Patient was stabilized prior to transfer to [place].    PAST MEDICAL & SURGICAL HISTORY:  Atrial fibrillation      Hypothyroid      HTN (hypertension)      Hyperlipidemia      History of blood clotting disorder      S/P IVC filter        Allergies: No Known Allergies    Medications: atorvastatin 10 milliGRAM(s) Oral at bedtime  cyanocobalamin 1000 MICROGram(s) Oral daily  folic acid 1 milliGRAM(s) Oral daily  levothyroxine 50 MICROGram(s) Oral daily  levothyroxine 75 MICROGram(s) Oral daily  lisinopril 10 milliGRAM(s) Oral two times a day  metoprolol tartrate 50 milliGRAM(s) Oral daily  oxycodone    5 mG/acetaminophen 325 mG 2 Tablet(s) Oral every 6 hours PRN  tiotropium 2.5 MICROgram(s) Inhaler 2 Puff(s) Inhalation daily      PHYSICAL EXAM:  Vital Signs Last 24 Hrs  T(C): 35.6 (29 Nov 2023 23:00), Max: 36.4 (29 Nov 2023 19:52)  T(F): 96 (29 Nov 2023 23:00), Max: 97.6 (29 Nov 2023 19:52)  HR: 70 (29 Nov 2023 23:10) (62 - 70)  BP: 132/69 (29 Nov 2023 23:10) (78/52 - 132/69)  BP(mean): --  RR: 18 (29 Nov 2023 23:10) (18 - 20)  SpO2: 97% (29 Nov 2023 23:10) (96% - 97%)    Parameters below as of 29 Nov 2023 23:10  Patient On (Oxygen Delivery Method): room air        Physical Exam:  General: NAD. Alert.  Resp: NLB on RA.    BUE:  No open skin or wounds  NTTP shoulder, upper arm, elbow, forearm, wrist or hand  Full baseline painless ROM at shoulder, elbow, wrist, and   SILT in Ax/M/U/R distributions.  AIN/PIN/U motor intact.  2+ distal pulses with normal cap refill at distal finger tips.   Compartments soft and compressible.    RLE:  No open skin or wounds  TTP hip,   NTTP thigh, knee, leg, ankle or foot.  ROM limited by pain  +pain with log-roll  SILT DP/SP/T/Evans/Sa.  EHL/FHL/TA/Gs motor intact.  2+ DP pulses with normal cap refill distally.  Compartments soft and compressible.   No pain on passive stretch.    LLE:   No open skin or wounds  NTTP hip, thigh, knee, leg, ankle or foot.   Full baseline painless ROM at hip, knee, ankle and toes   No pain with log-roll or axial compression  Able to actively SLR.  SILT DP/SP/T/Evans/Sa.   EHL/FHL/TA/Gs motor intact.  2+ DP pulses with normal cap refill distally.  Compartments soft and compressible. No pain on passive stretch.    Labs:                        9.3    11.38 )-----------( 358      ( 29 Nov 2023 22:49 )             28.2     11-29    138  |  104  |  19  ----------------------------<  99  3.8   |  22  |  0.8    Ca    8.8      29 Nov 2023 20:28  Mg     1.9     11-29    TPro  5.4<L>  /  Alb  3.6  /  TBili  0.3  /  DBili  x   /  AST  19  /  ALT  11  /  AlkPhos  58  11-29    PT/INR - ( 29 Nov 2023 20:28 )   PT: 24.50 sec;   INR: 2.13 ratio         PTT - ( 29 Nov 2023 20:28 )  PTT:43.4 sec    Imaging XR: comminuted displaced right acetabulum fracture with adjacent iliopsoas hematoma, healed left pubic ramus fracture    A/P: 73y Female with Right acetabulum fracture. Discussed with patient RBA of conservative vs surgical treatment and that surgery may be recommended for fixation of this fracture.    - Bed rest  - Pain control per primary team  - DVT PPx per primary  - Patient requires Internal Medicine pre-op clearance / risk stratification / medical optimization  - Pre-Op CBC, CMP/BMP, PT/PTT/INR, Type & Screen x2, CXR, EKG, COVID test  - Diet okay for now  - Trend Hgb  - Final plan to follow after discussion with orthopaedic trauma attending ORTHOPAEDIC SURGERY CONSULT NOTE    Reason for Consult: R acetabulum fracture    HPI: 73yFemale PMH of Afib on xarelto, DVT s/p IVC filter, COPD, HTN, HLD, and hypothyroidism who presents as trasnfer from [facility] ED with pain in right hip and inability to ambulate s/p mechanical fall at home. Accompanied by  and son at bedside. Patient reports she slipped and fell while in the kitchen early evening. Lives with . Does not use an assistive device at baseline. Patient denies head trauma or LOC. Denies pain elsewhere. Denies paresthesias. Currently takes Xarelto, last dose yesterday morning. Of note, patient has history of two prior falls when she suffered a left pubic ramus fracture that was treated nonoperatively with weight bearing as tolerated.    In [facility] ED, patient was found to be hemodynamically unstable with systolic BP of 70s. Received 1u PRBC and prothrombin. Patient was stabilized prior to transfer to [place].    PAST MEDICAL & SURGICAL HISTORY:  Atrial fibrillation      Hypothyroid      HTN (hypertension)      Hyperlipidemia      History of blood clotting disorder      S/P IVC filter        Allergies: No Known Allergies    Medications: atorvastatin 10 milliGRAM(s) Oral at bedtime  cyanocobalamin 1000 MICROGram(s) Oral daily  folic acid 1 milliGRAM(s) Oral daily  levothyroxine 50 MICROGram(s) Oral daily  levothyroxine 75 MICROGram(s) Oral daily  lisinopril 10 milliGRAM(s) Oral two times a day  metoprolol tartrate 50 milliGRAM(s) Oral daily  oxycodone    5 mG/acetaminophen 325 mG 2 Tablet(s) Oral every 6 hours PRN  tiotropium 2.5 MICROgram(s) Inhaler 2 Puff(s) Inhalation daily      PHYSICAL EXAM:  Vital Signs Last 24 Hrs  T(C): 35.6 (29 Nov 2023 23:00), Max: 36.4 (29 Nov 2023 19:52)  T(F): 96 (29 Nov 2023 23:00), Max: 97.6 (29 Nov 2023 19:52)  HR: 70 (29 Nov 2023 23:10) (62 - 70)  BP: 132/69 (29 Nov 2023 23:10) (78/52 - 132/69)  BP(mean): --  RR: 18 (29 Nov 2023 23:10) (18 - 20)  SpO2: 97% (29 Nov 2023 23:10) (96% - 97%)    Parameters below as of 29 Nov 2023 23:10  Patient On (Oxygen Delivery Method): room air        Physical Exam:  General: NAD. Alert.  Resp: NLB on RA.    BUE:  No open skin or wounds  NTTP shoulder, upper arm, elbow, forearm, wrist or hand  Full baseline painless ROM at shoulder, elbow, wrist, and   SILT in Ax/M/U/R distributions.  AIN/PIN/U motor intact.  2+ distal pulses with normal cap refill at distal finger tips.   Compartments soft and compressible.    RLE:  No open skin or wounds  TTP hip,   NTTP thigh, knee, leg, ankle or foot.  ROM limited by pain  +pain with log-roll  SILT DP/SP/T/Evans/Sa.  EHL/FHL/TA/Gs motor intact.  2+ DP pulses with normal cap refill distally.  Compartments soft and compressible.   No pain on passive stretch.    LLE:   No open skin or wounds  NTTP hip, thigh, knee, leg, ankle or foot.   Full baseline painless ROM at hip, knee, ankle and toes   No pain with log-roll or axial compression  Able to actively SLR.  SILT DP/SP/T/Evans/Sa.   EHL/FHL/TA/Gs motor intact.  2+ DP pulses with normal cap refill distally.  Compartments soft and compressible. No pain on passive stretch.    Labs:                        9.3    11.38 )-----------( 358      ( 29 Nov 2023 22:49 )             28.2     11-29    138  |  104  |  19  ----------------------------<  99  3.8   |  22  |  0.8    Ca    8.8      29 Nov 2023 20:28  Mg     1.9     11-29    TPro  5.4<L>  /  Alb  3.6  /  TBili  0.3  /  DBili  x   /  AST  19  /  ALT  11  /  AlkPhos  58  11-29    PT/INR - ( 29 Nov 2023 20:28 )   PT: 24.50 sec;   INR: 2.13 ratio         PTT - ( 29 Nov 2023 20:28 )  PTT:43.4 sec    Imaging XR: comminuted displaced right acetabulum fracture with adjacent iliopsoas hematoma, healed left pubic ramus fracture    A/P: 73y Female with Right acetabulum fracture. Discussed with patient RBA of conservative vs surgical treatment and that surgery may be recommended for fixation of this fracture.    - Bed rest  - Pain control per primary team  - DVT PPx per primary  - Patient requires Internal Medicine pre-op clearance / risk stratification / medical optimization  - Pre-Op CBC, CMP/BMP, PT/PTT/INR, Type & Screen x2, CXR, EKG, COVID test  - Diet okay for now  - Trend Hgb  - Final plan to follow after discussion with orthopaedic trauma attending ORTHOPAEDIC SURGERY CONSULT NOTE    Reason for Consult: R acetabulum fracture    HPI: 73yFemale PMH of Afib on xarelto, DVT s/p IVC filter, COPD, HTN, HLD, and hypothyroidism who presents as trasnfer from [facility] ED with pain in right hip and inability to ambulate s/p mechanical fall at home. Accompanied by  and son at bedside. Patient reports she slipped and fell while in the kitchen early evening. Lives with . Does not use an assistive device at baseline. Patient denies head trauma or LOC. Denies pain elsewhere. Denies paresthesias. Currently takes Xarelto, last dose yesterday morning. Of note, patient has history of two prior falls when she suffered a left pubic ramus fracture that was treated nonoperatively with weight bearing as tolerated.    In [facility] ED, patient was found to be hemodynamically unstable with systolic BP of 70s. Received 1u PRBC and prothrombin. Patient was stabilized prior to transfer to [place].    PAST MEDICAL & SURGICAL HISTORY:  Atrial fibrillation      Hypothyroid      HTN (hypertension)      Hyperlipidemia      History of blood clotting disorder      S/P IVC filter        Allergies: No Known Allergies    Medications: atorvastatin 10 milliGRAM(s) Oral at bedtime  cyanocobalamin 1000 MICROGram(s) Oral daily  folic acid 1 milliGRAM(s) Oral daily  levothyroxine 50 MICROGram(s) Oral daily  levothyroxine 75 MICROGram(s) Oral daily  lisinopril 10 milliGRAM(s) Oral two times a day  metoprolol tartrate 50 milliGRAM(s) Oral daily  oxycodone    5 mG/acetaminophen 325 mG 2 Tablet(s) Oral every 6 hours PRN  tiotropium 2.5 MICROgram(s) Inhaler 2 Puff(s) Inhalation daily      PHYSICAL EXAM:  Vital Signs Last 24 Hrs  T(C): 35.6 (29 Nov 2023 23:00), Max: 36.4 (29 Nov 2023 19:52)  T(F): 96 (29 Nov 2023 23:00), Max: 97.6 (29 Nov 2023 19:52)  HR: 70 (29 Nov 2023 23:10) (62 - 70)  BP: 132/69 (29 Nov 2023 23:10) (78/52 - 132/69)  BP(mean): --  RR: 18 (29 Nov 2023 23:10) (18 - 20)  SpO2: 97% (29 Nov 2023 23:10) (96% - 97%)    Parameters below as of 29 Nov 2023 23:10  Patient On (Oxygen Delivery Method): room air        Physical Exam:  General: NAD. Alert.  Resp: NLB on RA.    BUE:  No open skin or wounds  NTTP shoulder, upper arm, elbow, forearm, wrist or hand  Full baseline painless ROM at shoulder, elbow, wrist, and   SILT in Ax/M/U/R distributions.  AIN/PIN/U motor intact.  2+ distal pulses with normal cap refill at distal finger tips.   Compartments soft and compressible.    RLE:  No open skin or wounds  TTP hip,   NTTP thigh, knee, leg, ankle or foot.  ROM limited by pain  +pain with log-roll  SILT DP/SP/T/Evans/Sa.  EHL/FHL/TA/Gs motor intact.  2+ DP pulses with normal cap refill distally.  Compartments soft and compressible.   No pain on passive stretch.    LLE:   No open skin or wounds  NTTP hip, thigh, knee, leg, ankle or foot.   Full baseline painless ROM at hip, knee, ankle and toes   No pain with log-roll or axial compression  Able to actively SLR.  SILT DP/SP/T/Evans/Sa.   EHL/FHL/TA/Gs motor intact.  2+ DP pulses with normal cap refill distally.  Compartments soft and compressible. No pain on passive stretch.    Labs:                        9.3    11.38 )-----------( 358      ( 29 Nov 2023 22:49 )             28.2     11-29    138  |  104  |  19  ----------------------------<  99  3.8   |  22  |  0.8    Ca    8.8      29 Nov 2023 20:28  Mg     1.9     11-29    TPro  5.4<L>  /  Alb  3.6  /  TBili  0.3  /  DBili  x   /  AST  19  /  ALT  11  /  AlkPhos  58  11-29    PT/INR - ( 29 Nov 2023 20:28 )   PT: 24.50 sec;   INR: 2.13 ratio         PTT - ( 29 Nov 2023 20:28 )  PTT:43.4 sec    Imaging XR: comminuted displaced right acetabulum fracture with adjacent iliopsoas hematoma, healed left pubic ramus fracture    A/P: 73y Female with Right acetabulum fracture. Discussed with patient RBA of conservative vs surgical treatment and that surgery may be recommended for fixation of this fracture.    - Bed rest  - Pain control per primary team  - DVT PPx per primary  - Patient requires Internal Medicine pre-op clearance / risk stratification / medical optimization  - Pre-Op CBC, CMP/BMP, PT/PTT/INR, Type & Screen x2, CXR, EKG, COVID test  - Diet okay for now  - Trend Hgb  - Final plan to follow after discussion with orthopaedic trauma attending ORTHOPAEDIC SURGERY CONSULT NOTE    Reason for Consult: R acetabulum fracture    HPI: 73yFemale PMH of Afib on xarelto, DVT s/p IVC filter, COPD, HTN, HLD, and hypothyroidism who presents as trasnfer from [facility] ED with pain in right hip and inability to ambulate s/p mechanical fall at home. Accompanied by  and son at bedside. Patient reports she slipped and fell while in the kitchen early evening. Lives with . Patient denies head trauma or LOC. Denies pain elsewhere. Denies paresthesias. Does not use an assistive device at baseline. However, patient reports she is mildly limited physically due to her COPD and respiratory status. Currently takes Xarelto, last dose yesterday morning. Of note, patient has history of two prior falls when she suffered a left pubic ramus fracture that was treated nonoperatively with weight bearing as tolerated.    In [facility] ED, patient was found to be hemodynamically unstable with systolic BP of 70s. Received 1u PRBC and prothrombin. Patient was stabilized prior to transfer to [place].    PAST MEDICAL & SURGICAL HISTORY:  Atrial fibrillation      Hypothyroid      HTN (hypertension)      Hyperlipidemia      History of blood clotting disorder      S/P IVC filter        Allergies: No Known Allergies    Medications: atorvastatin 10 milliGRAM(s) Oral at bedtime  cyanocobalamin 1000 MICROGram(s) Oral daily  folic acid 1 milliGRAM(s) Oral daily  levothyroxine 50 MICROGram(s) Oral daily  levothyroxine 75 MICROGram(s) Oral daily  lisinopril 10 milliGRAM(s) Oral two times a day  metoprolol tartrate 50 milliGRAM(s) Oral daily  oxycodone    5 mG/acetaminophen 325 mG 2 Tablet(s) Oral every 6 hours PRN  tiotropium 2.5 MICROgram(s) Inhaler 2 Puff(s) Inhalation daily      PHYSICAL EXAM:  Vital Signs Last 24 Hrs  T(C): 35.6 (29 Nov 2023 23:00), Max: 36.4 (29 Nov 2023 19:52)  T(F): 96 (29 Nov 2023 23:00), Max: 97.6 (29 Nov 2023 19:52)  HR: 70 (29 Nov 2023 23:10) (62 - 70)  BP: 132/69 (29 Nov 2023 23:10) (78/52 - 132/69)  BP(mean): --  RR: 18 (29 Nov 2023 23:10) (18 - 20)  SpO2: 97% (29 Nov 2023 23:10) (96% - 97%)    Parameters below as of 29 Nov 2023 23:10  Patient On (Oxygen Delivery Method): room air        Physical Exam:  General: NAD. Alert.  Resp: NLB on RA.    BUE:  No open skin or wounds  NTTP shoulder, upper arm, elbow, forearm, wrist or hand  Full baseline painless ROM at shoulder, elbow, wrist, and   SILT in Ax/M/U/R distributions.  AIN/PIN/U motor intact.  2+ distal pulses with normal cap refill at distal finger tips.   Compartments soft and compressible.    RLE:  No open skin or wounds  TTP hip,   NTTP thigh, knee, leg, ankle or foot.  ROM limited by pain  +pain with log-roll  SILT DP/SP/T/Evnas/Sa.  EHL/FHL/TA/Gs motor intact.  2+ DP pulses with normal cap refill distally.  Compartments soft and compressible.   No pain on passive stretch.    LLE:   No open skin or wounds  NTTP hip, thigh, knee, leg, ankle or foot.   Full baseline painless ROM at hip, knee, ankle and toes   No pain with log-roll or axial compression  Able to actively SLR.  SILT DP/SP/T/Evans/Sa.   EHL/FHL/TA/Gs motor intact.  2+ DP pulses with normal cap refill distally.  Compartments soft and compressible. No pain on passive stretch.    Labs:                        9.3    11.38 )-----------( 358      ( 29 Nov 2023 22:49 )             28.2     11-29    138  |  104  |  19  ----------------------------<  99  3.8   |  22  |  0.8    Ca    8.8      29 Nov 2023 20:28  Mg     1.9     11-29    TPro  5.4<L>  /  Alb  3.6  /  TBili  0.3  /  DBili  x   /  AST  19  /  ALT  11  /  AlkPhos  58  11-29    PT/INR - ( 29 Nov 2023 20:28 )   PT: 24.50 sec;   INR: 2.13 ratio         PTT - ( 29 Nov 2023 20:28 )  PTT:43.4 sec    Imaging XR/CT: comminuted displaced right acetabulum fracture with adjacent iliopsoas hematoma, healed left pubic ramus fracture    A/P: 73y Female with Right acetabulum fracture. Discussed with patient RBA of conservative vs surgical treatment and that surgery may be recommended for fixation of this fracture.    - Bed rest  - Pain control per primary team  - DVT PPx per primary  - Patient requires Internal Medicine pre-op clearance / risk stratification / medical optimization  - Pre-Op CBC, CMP/BMP, PT/PTT/INR, Type & Screen x2, CXR, EKG, COVID test  - Diet okay for now  - Trend Hgb  - Final plan to follow after discussion with orthopaedic trauma attending ORTHOPAEDIC SURGERY CONSULT NOTE    Reason for Consult: R acetabulum fracture    HPI: 73yFemale PMH of Afib on xarelto, DVT s/p IVC filter, COPD, HTN, HLD, and hypothyroidism who presents as trasnfer from [facility] ED with pain in right hip and inability to ambulate s/p mechanical fall at home. Accompanied by  and son at bedside. Patient reports she slipped and fell while in the kitchen early evening. Lives with . Patient denies head trauma or LOC. Denies pain elsewhere. Denies paresthesias. Does not use an assistive device at baseline. However, patient reports she is mildly limited physically due to her COPD and respiratory status. Currently takes Xarelto, last dose yesterday morning. Of note, patient has history of two prior falls when she suffered a left pubic ramus fracture that was treated nonoperatively with weight bearing as tolerated.    In [facility] ED, patient was found to be hemodynamically unstable with systolic BP of 70s. Received 1u PRBC and prothrombin. Patient was stabilized prior to transfer to [place].    PAST MEDICAL & SURGICAL HISTORY:  Atrial fibrillation      Hypothyroid      HTN (hypertension)      Hyperlipidemia      History of blood clotting disorder      S/P IVC filter        Allergies: No Known Allergies    Medications: atorvastatin 10 milliGRAM(s) Oral at bedtime  cyanocobalamin 1000 MICROGram(s) Oral daily  folic acid 1 milliGRAM(s) Oral daily  levothyroxine 50 MICROGram(s) Oral daily  levothyroxine 75 MICROGram(s) Oral daily  lisinopril 10 milliGRAM(s) Oral two times a day  metoprolol tartrate 50 milliGRAM(s) Oral daily  oxycodone    5 mG/acetaminophen 325 mG 2 Tablet(s) Oral every 6 hours PRN  tiotropium 2.5 MICROgram(s) Inhaler 2 Puff(s) Inhalation daily      PHYSICAL EXAM:  Vital Signs Last 24 Hrs  T(C): 35.6 (29 Nov 2023 23:00), Max: 36.4 (29 Nov 2023 19:52)  T(F): 96 (29 Nov 2023 23:00), Max: 97.6 (29 Nov 2023 19:52)  HR: 70 (29 Nov 2023 23:10) (62 - 70)  BP: 132/69 (29 Nov 2023 23:10) (78/52 - 132/69)  BP(mean): --  RR: 18 (29 Nov 2023 23:10) (18 - 20)  SpO2: 97% (29 Nov 2023 23:10) (96% - 97%)    Parameters below as of 29 Nov 2023 23:10  Patient On (Oxygen Delivery Method): room air        Physical Exam:  General: NAD. Alert.  Resp: NLB on RA.    BUE:  No open skin or wounds  NTTP shoulder, upper arm, elbow, forearm, wrist or hand  Full baseline painless ROM at shoulder, elbow, wrist, and   SILT in Ax/M/U/R distributions.  AIN/PIN/U motor intact.  2+ distal pulses with normal cap refill at distal finger tips.   Compartments soft and compressible.    RLE:  No open skin or wounds  TTP hip,   NTTP thigh, knee, leg, ankle or foot.  ROM limited by pain  +pain with log-roll  SILT DP/SP/T/Evans/Sa.  EHL/FHL/TA/Gs motor intact.  2+ DP pulses with normal cap refill distally.  Compartments soft and compressible.   No pain on passive stretch.    LLE:   No open skin or wounds  NTTP hip, thigh, knee, leg, ankle or foot.   Full baseline painless ROM at hip, knee, ankle and toes   No pain with log-roll or axial compression  Able to actively SLR.  SILT DP/SP/T/Evans/Sa.   EHL/FHL/TA/Gs motor intact.  2+ DP pulses with normal cap refill distally.  Compartments soft and compressible. No pain on passive stretch.    Labs:                        9.3    11.38 )-----------( 358      ( 29 Nov 2023 22:49 )             28.2     11-29    138  |  104  |  19  ----------------------------<  99  3.8   |  22  |  0.8    Ca    8.8      29 Nov 2023 20:28  Mg     1.9     11-29    TPro  5.4<L>  /  Alb  3.6  /  TBili  0.3  /  DBili  x   /  AST  19  /  ALT  11  /  AlkPhos  58  11-29    PT/INR - ( 29 Nov 2023 20:28 )   PT: 24.50 sec;   INR: 2.13 ratio         PTT - ( 29 Nov 2023 20:28 )  PTT:43.4 sec    Imaging XR/CT: comminuted displaced right acetabulum fracture with adjacent iliopsoas hematoma, healed left pubic ramus fracture    A/P: 73y Female with Right acetabulum fracture. Discussed with patient RBA of conservative vs surgical treatment and that surgery may be recommended for fixation of this fracture.    - Bed rest  - Pain control per primary team  - DVT PPx per primary  - Patient requires Internal Medicine pre-op clearance / risk stratification / medical optimization  - Pre-Op CBC, CMP/BMP, PT/PTT/INR, Type & Screen x2, CXR, EKG, COVID test  - Diet okay for now  - Trend Hgb  - Final plan to follow after discussion with orthopaedic trauma attending ORTHOPAEDIC SURGERY CONSULT NOTE    Reason for Consult: R acetabulum fracture    HPI: 73yFemale PMH of Afib on xarelto, DVT s/p IVC filter, COPD, HTN, HLD, and hypothyroidism who presents as trasnfer from [facility] ED with pain in right hip and inability to ambulate s/p mechanical fall at home. Accompanied by  and son at bedside. Patient reports she slipped and fell while in the kitchen early evening. Lives with . Patient denies head trauma or LOC. Denies pain elsewhere. Denies paresthesias. Does not use an assistive device at baseline. However, patient reports she is mildly limited physically due to her COPD and respiratory status. Currently takes Xarelto, last dose yesterday morning. Of note, patient has history of two prior falls when she suffered a left pubic ramus fracture that was treated nonoperatively with weight bearing as tolerated.    In [facility] ED, patient was found to be hemodynamically unstable with systolic BP of 70s. Received 1u PRBC and prothrombin. Patient was stabilized prior to transfer to [place].    PAST MEDICAL & SURGICAL HISTORY:  Atrial fibrillation      Hypothyroid      HTN (hypertension)      Hyperlipidemia      History of blood clotting disorder      S/P IVC filter        Allergies: No Known Allergies    Medications: atorvastatin 10 milliGRAM(s) Oral at bedtime  cyanocobalamin 1000 MICROGram(s) Oral daily  folic acid 1 milliGRAM(s) Oral daily  levothyroxine 50 MICROGram(s) Oral daily  levothyroxine 75 MICROGram(s) Oral daily  lisinopril 10 milliGRAM(s) Oral two times a day  metoprolol tartrate 50 milliGRAM(s) Oral daily  oxycodone    5 mG/acetaminophen 325 mG 2 Tablet(s) Oral every 6 hours PRN  tiotropium 2.5 MICROgram(s) Inhaler 2 Puff(s) Inhalation daily      PHYSICAL EXAM:  Vital Signs Last 24 Hrs  T(C): 35.6 (29 Nov 2023 23:00), Max: 36.4 (29 Nov 2023 19:52)  T(F): 96 (29 Nov 2023 23:00), Max: 97.6 (29 Nov 2023 19:52)  HR: 70 (29 Nov 2023 23:10) (62 - 70)  BP: 132/69 (29 Nov 2023 23:10) (78/52 - 132/69)  BP(mean): --  RR: 18 (29 Nov 2023 23:10) (18 - 20)  SpO2: 97% (29 Nov 2023 23:10) (96% - 97%)    Parameters below as of 29 Nov 2023 23:10  Patient On (Oxygen Delivery Method): room air        Physical Exam:  General: NAD. Alert.  Resp: NLB on RA.    BUE:  No open skin or wounds  NTTP shoulder, upper arm, elbow, forearm, wrist or hand  Full baseline painless ROM at shoulder, elbow, wrist, and   SILT in Ax/M/U/R distributions.  AIN/PIN/U motor intact.  2+ distal pulses with normal cap refill at distal finger tips.   Compartments soft and compressible.    RLE:  No open skin or wounds  TTP hip,   NTTP thigh, knee, leg, ankle or foot.  ROM limited by pain  +pain with log-roll  SILT DP/SP/T/Evans/Sa.  EHL/FHL/TA/Gs motor intact.  2+ DP pulses with normal cap refill distally.  Compartments soft and compressible.   No pain on passive stretch.    LLE:   No open skin or wounds  NTTP hip, thigh, knee, leg, ankle or foot.   Full baseline painless ROM at hip, knee, ankle and toes   No pain with log-roll or axial compression  Able to actively SLR.  SILT DP/SP/T/Evans/Sa.   EHL/FHL/TA/Gs motor intact.  2+ DP pulses with normal cap refill distally.  Compartments soft and compressible. No pain on passive stretch.    Labs:                        9.3    11.38 )-----------( 358      ( 29 Nov 2023 22:49 )             28.2     11-29    138  |  104  |  19  ----------------------------<  99  3.8   |  22  |  0.8    Ca    8.8      29 Nov 2023 20:28  Mg     1.9     11-29    TPro  5.4<L>  /  Alb  3.6  /  TBili  0.3  /  DBili  x   /  AST  19  /  ALT  11  /  AlkPhos  58  11-29    PT/INR - ( 29 Nov 2023 20:28 )   PT: 24.50 sec;   INR: 2.13 ratio         PTT - ( 29 Nov 2023 20:28 )  PTT:43.4 sec    Imaging XR/CT: comminuted displaced right acetabulum fracture with adjacent iliopsoas hematoma, healed left pubic ramus fracture    A/P: 73y Female with Right acetabulum fracture. Discussed with patient RBA of conservative vs surgical treatment and that surgery may be recommended for fixation of this fracture.    - Bed rest  - Pain control per primary team  - DVT PPx per primary  - Patient requires Internal Medicine pre-op clearance / risk stratification / medical optimization  - Pre-Op CBC, CMP/BMP, PT/PTT/INR, Type & Screen x2, CXR, EKG, COVID test  - Diet okay for now  - Trend Hgb  - Final plan to follow after discussion with orthopaedic trauma attending

## 2023-11-30 NOTE — CONSULT NOTE ADULT - ASSESSMENT
Impression:     Severe emphysema   COPD not in exacerbation   Right acetabular fracture   Retroperitoneal hematoma  Lung nodule/opacity RLL   Preop clearance     Plan:     CXR and CT chest reviewed   Severe emphysema   RLL nodular opacity/irregular shaped   Keep spo2 more than 92%; avoid hyperoxia   Continue same inhaler regimen: Symbicort and Spiriva   Continue albuterol as needed   Incentive spirometry   Patient is high risk; moderate risk surgery   Avoid general anesthesia if possible; locoregional preferred  DVT ppx if cbc is stable   Will need outpatient follow up with Dr rivera regarding the RLL opacity/bronchiectatic area   Impression:     Severe emphysema   COPD not in exacerbation   Right acetabular fracture   Retroperitoneal hematoma  Lung nodule/opacity RLL   Preop clearance     Plan:     CXR and CT chest reviewed   Severe emphysema   RLL nodular opacity/irregular shaped   Keep spo2 more than 92%; avoid hyperoxia   Continue same inhaler regimen: Symbicort and Spiriva   Continue albuterol as needed   Incentive spirometry   Patient is high risk; moderate risk surgery   Avoid general anesthesia if possible; locoregional preferred  DVT ppx if cbc is stable; was previously on Xarelto for DVT and history of IVC filter   Will need outpatient follow up with Dr rivera regarding the RLL opacity/bronchiectatic area

## 2023-11-30 NOTE — CONSULT NOTE ADULT - SUBJECTIVE AND OBJECTIVE BOX
MAURILIO HENSON   [id number]   [birth year]  73yF    Admit Date: 11-30-23  Indication for SDU/SICU:  OR #1-        ============================  HPI   HPI: 73F w/ PMHx of Afib (on Xarelto), h/o DVT/PE, s/p IVC filter, HTN, hypothyroidism, COPD/emphysema, ex-smoker (quit >16 years ago) who presents as a TRAUMA ALERT (transfer from [facility] ED) s/p mechanical fall (-HT -LOC +AC). Patient reportedly slipped and fell while in the kitchen yesterday afternoon. Complaining of right hip pain and inability to ambulate. She is accompanied by her  and son. She does not use an assistive device at baseline. Of note, patient has a h/o x2 prior falls, resulting in left pubic ramus fracture that was treated nonoperatively. Denies pain elsewhere. Denies any numbness or tingling. Reports her last Xarelto dose was the evening prior to presentation (11/28 PM).     Hypotensive on arrival to [facility], s/p Kcentra and 1u PRBC. Patient examined in [place] ED, hemodynamically stable. Complaining of pain only when moving RLE. Work-up demonstrated R comminuted acetabular fracture.    Imaging:  CTA pelvis: No evidence of active arterial hemorrhage. Redemonstration of an acute comminuted displaced right acetabular fracture with adjacent iliopsoas/retroperitoneal hematoma extending superiorly anterior to the right iliac bone.  CT chest: Negative for acute pathology. Patchy areas of subsegmental consolidation right lower lobe and peripheral right middle lobe   CT abdomen: Negative for acute pathology.  CT head: Negative for acute pathology.  CT cervical spine: Negative for acute pathology. Degenerative disc changes.   Elbow xray: negative     24 Hour Events  -Admission under SICU service    [X] A ten-point review of systems was otherwise negative except as noted above.  [  ] Due to altered mental status/intubation, subjective information was not attained from the patient. History was obtained, to the extent possible, from review of the chart and collateral sources of information.    =========================================================================================================================================    PMH  PAST MEDICAL & SURGICAL HISTORY:  Atrial fibrillation  Hypothyroid  HTN (hypertension)  Hyperlipidemia  History of blood clotting disorder  S/P IVC filter        Home Meds:  Home Medications:  fluticasone-salmeterol 500 mcg-50 mcg inhalation powder: 1 powder inhaled once a day (14 Apr 2023 11:22)  folic acid 1 mg oral tablet: 1 tablet orally once a day (14 Apr 2023 11:22)  levothyroxine 50 mcg (0.05 mg) oral capsule: 1 capsule orally Tuesday, Thursday, Saturday T TH S SU (14 Apr 2023 11:28)  Lipitor 10 mg oral tablet: 1 tablet orally once a day (at bedtime) (14 Apr 2023 11:22)  METOPROL TAR 50MG TAB: 1 cap(s) orally once a day (30 Nov 2023 01:07)  oxycodone-acetaminophen 5 mg-325 mg oral tablet: 2 tab(s) orally every 6 hours As needed Severe Pain (7 - 10) (14 Apr 2023 11:28)  Spiriva 18 mcg inhalation capsule: 1 cap(s) inhaled once a day (30 Nov 2023 04:23)  Synthroid 75 mcg (0.075 mg) oral tablet: 1 tablet orally Monday, Wednesday, and Friday M W F (14 Apr 2023 11:28)  Vitamin B12 1000 mcg oral tablet: 1 tab(s) orally once a day (10 Apr 2023 17:43)  XARELTO 20MG TAB: With dinner (14 Apr 2023 11:22)  Zestril 10 mg oral tablet: 1 tablet orally 2 times a day (30 Nov 2023 01:07)     Allergies  Allergies    No Known Allergies    Intolerances       Current Medications:  atorvastatin 10 milliGRAM(s) Oral at bedtime  cyanocobalamin 1000 MICROGram(s) Oral daily  enoxaparin Injectable 30 milliGRAM(s) SubCutaneous every 24 hours  folic acid 1 milliGRAM(s) Oral daily  levothyroxine 50 MICROGram(s) Oral daily  levothyroxine 75 MICROGram(s) Oral daily  lisinopril 10 milliGRAM(s) Oral two times a day  metoprolol tartrate 50 milliGRAM(s) Oral daily  oxycodone    5 mG/acetaminophen 325 mG 2 Tablet(s) Oral every 6 hours PRN Severe Pain (7 - 10)  tiotropium 2.5 MICROgram(s) Inhaler 2 Puff(s) Inhalation daily      VITAL SIGNS, INS/OUTS (Last 24Hours)  ICU Vital Signs Last 24 Hrs  T(C): 35.6 (29 Nov 2023 23:00), Max: 36.4 (29 Nov 2023 19:52)  T(F): 96 (29 Nov 2023 23:00), Max: 97.6 (29 Nov 2023 19:52)  HR: 67 (30 Nov 2023 03:26) (62 - 70)  BP: 126/64 (30 Nov 2023 03:26) (78/52 - 132/69)  BP(mean): --  ABP: --  ABP(mean): --  RR: 18 (30 Nov 2023 03:26) (18 - 20)  SpO2: 100% (30 Nov 2023 03:26) (96% - 100%)    O2 Parameters below as of 30 Nov 2023 03:26  Patient On (Oxygen Delivery Method): nasal cannula  O2 Flow (L/min): 2        I&O's Summary      Physical Exam:   ----------------------------------------------------------------------------------------------------------  GCS:  15    Exam: A&Ox3, no focal deficits  RESPIRATORY:  Normal expansion/effort, 2 L NC  CARDIOVASCULAR: Afib, non-tachycardic  No peripheral edema  GASTROINTESTINAL:  Abdomen soft, non-tender, non-distended  MUSCULOSKELETAL:  Pain to R pelvis on deep palpation. Extremities warm, pink, well-perfused.  DERM: Superficial laceration to R elbow, R knee   : Primafit in place.     Tubes/Lines/Drains   ----------------------------------------------------------------------------------------------------------  [x] Peripheral IV  [ ] Urinary Catheter Best                                               [ ] Central Venous Line                   [ ] Arterial Line

## 2023-11-30 NOTE — H&P ADULT - NSHPLABSRESULTS_GEN_ALL_CORE
LABS:                        9.3    11.38 )-----------( 358      ( 29 Nov 2023 22:49 )             28.2     11-29    138  |  104  |  19  ----------------------------<  99  3.8   |  22  |  0.8    Ca    8.8      29 Nov 2023 20:28  Mg     1.9     11-29    TPro  5.4<L>  /  Alb  3.6  /  TBili  0.3  /  DBili  x   /  AST  19  /  ALT  11  /  AlkPhos  58  11-29    PT/INR - ( 29 Nov 2023 20:28 )   PT: 24.50 sec;   INR: 2.13 ratio         PTT - ( 29 Nov 2023 20:28 )  PTT:43.4 sec  Urinalysis Basic - ( 29 Nov 2023 20:28 )    Color: x / Appearance: x / SG: x / pH: x  Gluc: 99 mg/dL / Ketone: x  / Bili: x / Urobili: x   Blood: x / Protein: x / Nitrite: x   Leuk Esterase: x / RBC: x / WBC x   Sq Epi: x / Non Sq Epi: x / Bacteria: x    ___________________________________  RADIOLOGY & ADDITIONAL STUDIES:  < from: CT Chest w/ IV Cont (11.29.23 @ 21:42) >    IMPRESSION:    Acute comminuted displaced fracture right acetabulum with adjacent   iliopsoas hematoma (302/128-141).    Old healed left pubic fracture..    Patchy areas of subsegmental consolidation right lower lobe and   peripheral right middle lobe (302/49, 55). Follow-up imaging to document   resolution recommended.    --- End of Report ---

## 2023-11-30 NOTE — H&P ADULT - NSHPPHYSICALEXAM_GEN_ALL_CORE
T(C): 35.6 (11-29-23 @ 23:00), Max: 36.4 (11-29-23 @ 19:52)  HR: 70 (11-29-23 @ 23:10) (62 - 70)  BP: 132/69 (11-29-23 @ 23:10) (78/52 - 132/69)  RR: 18 (11-29-23 @ 23:10) (18 - 20)  SpO2: 97% (11-29-23 @ 23:10) (96% - 97%)    CONSTITUTIONAL: Well groomed, no apparent distress, AAOx3   EYES: PERRLA and symmetric, EOMI, No conjunctival or scleral injection, non-icteric  ENMT: Oral mucosa with moist membranes. Normal dentition; no pharyngeal injection or exudates             NECK: Supple, symmetric and without tracheal deviation   RESP: No respiratory distress, no use of accessory muscles  CV: RRR; no peripheral edema  GI: Soft, NT, ND  MSK: Normal gait; +TTP left hip  SKIN: +ecchymosis noted on neck (unrelated to trauma), right forearm; RLE w/ skin avulsion

## 2023-11-30 NOTE — CONSULT NOTE ADULT - REASON FOR ADMISSION
S/P mechanical fall (-HT, -LOC, +AC)

## 2023-12-01 LAB
ANION GAP SERPL CALC-SCNC: 10 MMOL/L — SIGNIFICANT CHANGE UP (ref 7–14)
ANION GAP SERPL CALC-SCNC: 10 MMOL/L — SIGNIFICANT CHANGE UP (ref 7–14)
ANION GAP SERPL CALC-SCNC: 11 MMOL/L — SIGNIFICANT CHANGE UP (ref 7–14)
ANION GAP SERPL CALC-SCNC: 11 MMOL/L — SIGNIFICANT CHANGE UP (ref 7–14)
BASOPHILS # BLD AUTO: 0.03 K/UL — SIGNIFICANT CHANGE UP (ref 0–0.2)
BASOPHILS # BLD AUTO: 0.03 K/UL — SIGNIFICANT CHANGE UP (ref 0–0.2)
BASOPHILS NFR BLD AUTO: 0.4 % — SIGNIFICANT CHANGE UP (ref 0–1)
BASOPHILS NFR BLD AUTO: 0.4 % — SIGNIFICANT CHANGE UP (ref 0–1)
BUN SERPL-MCNC: 14 MG/DL — SIGNIFICANT CHANGE UP (ref 10–20)
BUN SERPL-MCNC: 14 MG/DL — SIGNIFICANT CHANGE UP (ref 10–20)
BUN SERPL-MCNC: 17 MG/DL — SIGNIFICANT CHANGE UP (ref 10–20)
BUN SERPL-MCNC: 17 MG/DL — SIGNIFICANT CHANGE UP (ref 10–20)
CALCIUM SERPL-MCNC: 8.4 MG/DL — SIGNIFICANT CHANGE UP (ref 8.4–10.4)
CALCIUM SERPL-MCNC: 8.4 MG/DL — SIGNIFICANT CHANGE UP (ref 8.4–10.4)
CALCIUM SERPL-MCNC: 8.4 MG/DL — SIGNIFICANT CHANGE UP (ref 8.4–10.5)
CALCIUM SERPL-MCNC: 8.4 MG/DL — SIGNIFICANT CHANGE UP (ref 8.4–10.5)
CHLORIDE SERPL-SCNC: 103 MMOL/L — SIGNIFICANT CHANGE UP (ref 98–110)
CHLORIDE SERPL-SCNC: 103 MMOL/L — SIGNIFICANT CHANGE UP (ref 98–110)
CHLORIDE SERPL-SCNC: 105 MMOL/L — SIGNIFICANT CHANGE UP (ref 98–110)
CHLORIDE SERPL-SCNC: 105 MMOL/L — SIGNIFICANT CHANGE UP (ref 98–110)
CO2 SERPL-SCNC: 23 MMOL/L — SIGNIFICANT CHANGE UP (ref 17–32)
CREAT SERPL-MCNC: 0.6 MG/DL — LOW (ref 0.7–1.5)
CREAT SERPL-MCNC: 0.6 MG/DL — LOW (ref 0.7–1.5)
CREAT SERPL-MCNC: 0.7 MG/DL — SIGNIFICANT CHANGE UP (ref 0.7–1.5)
CREAT SERPL-MCNC: 0.7 MG/DL — SIGNIFICANT CHANGE UP (ref 0.7–1.5)
EGFR: 91 ML/MIN/1.73M2 — SIGNIFICANT CHANGE UP
EGFR: 91 ML/MIN/1.73M2 — SIGNIFICANT CHANGE UP
EGFR: 95 ML/MIN/1.73M2 — SIGNIFICANT CHANGE UP
EGFR: 95 ML/MIN/1.73M2 — SIGNIFICANT CHANGE UP
EOSINOPHIL # BLD AUTO: 0.05 K/UL — SIGNIFICANT CHANGE UP (ref 0–0.7)
EOSINOPHIL # BLD AUTO: 0.05 K/UL — SIGNIFICANT CHANGE UP (ref 0–0.7)
EOSINOPHIL NFR BLD AUTO: 0.7 % — SIGNIFICANT CHANGE UP (ref 0–8)
EOSINOPHIL NFR BLD AUTO: 0.7 % — SIGNIFICANT CHANGE UP (ref 0–8)
GLUCOSE SERPL-MCNC: 108 MG/DL — HIGH (ref 70–99)
GLUCOSE SERPL-MCNC: 108 MG/DL — HIGH (ref 70–99)
GLUCOSE SERPL-MCNC: 89 MG/DL — SIGNIFICANT CHANGE UP (ref 70–99)
GLUCOSE SERPL-MCNC: 89 MG/DL — SIGNIFICANT CHANGE UP (ref 70–99)
HCT VFR BLD CALC: 26.2 % — LOW (ref 37–47)
HCT VFR BLD CALC: 26.2 % — LOW (ref 37–47)
HCT VFR BLD CALC: 26.5 % — LOW (ref 37–47)
HCT VFR BLD CALC: 26.5 % — LOW (ref 37–47)
HGB BLD-MCNC: 8.5 G/DL — LOW (ref 12–16)
HGB BLD-MCNC: 8.5 G/DL — LOW (ref 12–16)
HGB BLD-MCNC: 8.8 G/DL — LOW (ref 12–16)
HGB BLD-MCNC: 8.8 G/DL — LOW (ref 12–16)
IMM GRANULOCYTES NFR BLD AUTO: 0.3 % — SIGNIFICANT CHANGE UP (ref 0.1–0.3)
IMM GRANULOCYTES NFR BLD AUTO: 0.3 % — SIGNIFICANT CHANGE UP (ref 0.1–0.3)
LYMPHOCYTES # BLD AUTO: 0.91 K/UL — LOW (ref 1.2–3.4)
LYMPHOCYTES # BLD AUTO: 0.91 K/UL — LOW (ref 1.2–3.4)
LYMPHOCYTES # BLD AUTO: 12.8 % — LOW (ref 20.5–51.1)
LYMPHOCYTES # BLD AUTO: 12.8 % — LOW (ref 20.5–51.1)
MAGNESIUM SERPL-MCNC: 1.9 MG/DL — SIGNIFICANT CHANGE UP (ref 1.8–2.4)
MAGNESIUM SERPL-MCNC: 1.9 MG/DL — SIGNIFICANT CHANGE UP (ref 1.8–2.4)
MAGNESIUM SERPL-MCNC: 2.2 MG/DL — SIGNIFICANT CHANGE UP (ref 1.8–2.4)
MAGNESIUM SERPL-MCNC: 2.2 MG/DL — SIGNIFICANT CHANGE UP (ref 1.8–2.4)
MCHC RBC-ENTMCNC: 32.4 G/DL — SIGNIFICANT CHANGE UP (ref 32–37)
MCHC RBC-ENTMCNC: 32.4 G/DL — SIGNIFICANT CHANGE UP (ref 32–37)
MCHC RBC-ENTMCNC: 33.2 G/DL — SIGNIFICANT CHANGE UP (ref 32–37)
MCHC RBC-ENTMCNC: 33.2 G/DL — SIGNIFICANT CHANGE UP (ref 32–37)
MCHC RBC-ENTMCNC: 33.5 PG — HIGH (ref 27–31)
MCHC RBC-ENTMCNC: 33.5 PG — HIGH (ref 27–31)
MCHC RBC-ENTMCNC: 33.6 PG — HIGH (ref 27–31)
MCHC RBC-ENTMCNC: 33.6 PG — HIGH (ref 27–31)
MCV RBC AUTO: 101.1 FL — HIGH (ref 81–99)
MCV RBC AUTO: 101.1 FL — HIGH (ref 81–99)
MCV RBC AUTO: 103.1 FL — HIGH (ref 81–99)
MCV RBC AUTO: 103.1 FL — HIGH (ref 81–99)
MONOCYTES # BLD AUTO: 1.03 K/UL — HIGH (ref 0.1–0.6)
MONOCYTES # BLD AUTO: 1.03 K/UL — HIGH (ref 0.1–0.6)
MONOCYTES NFR BLD AUTO: 14.5 % — HIGH (ref 1.7–9.3)
MONOCYTES NFR BLD AUTO: 14.5 % — HIGH (ref 1.7–9.3)
NEUTROPHILS # BLD AUTO: 5.07 K/UL — SIGNIFICANT CHANGE UP (ref 1.4–6.5)
NEUTROPHILS # BLD AUTO: 5.07 K/UL — SIGNIFICANT CHANGE UP (ref 1.4–6.5)
NEUTROPHILS NFR BLD AUTO: 71.3 % — SIGNIFICANT CHANGE UP (ref 42.2–75.2)
NEUTROPHILS NFR BLD AUTO: 71.3 % — SIGNIFICANT CHANGE UP (ref 42.2–75.2)
NRBC # BLD: 0 /100 WBCS — SIGNIFICANT CHANGE UP (ref 0–0)
PHOSPHATE SERPL-MCNC: 3 MG/DL — SIGNIFICANT CHANGE UP (ref 2.1–4.9)
PHOSPHATE SERPL-MCNC: 3 MG/DL — SIGNIFICANT CHANGE UP (ref 2.1–4.9)
PHOSPHATE SERPL-MCNC: 4.6 MG/DL — SIGNIFICANT CHANGE UP (ref 2.1–4.9)
PHOSPHATE SERPL-MCNC: 4.6 MG/DL — SIGNIFICANT CHANGE UP (ref 2.1–4.9)
PLATELET # BLD AUTO: 256 K/UL — SIGNIFICANT CHANGE UP (ref 130–400)
PLATELET # BLD AUTO: 256 K/UL — SIGNIFICANT CHANGE UP (ref 130–400)
PLATELET # BLD AUTO: 271 K/UL — SIGNIFICANT CHANGE UP (ref 130–400)
PLATELET # BLD AUTO: 271 K/UL — SIGNIFICANT CHANGE UP (ref 130–400)
PMV BLD: 10.6 FL — HIGH (ref 7.4–10.4)
PMV BLD: 10.6 FL — HIGH (ref 7.4–10.4)
PMV BLD: 9.9 FL — SIGNIFICANT CHANGE UP (ref 7.4–10.4)
PMV BLD: 9.9 FL — SIGNIFICANT CHANGE UP (ref 7.4–10.4)
POTASSIUM SERPL-MCNC: 4 MMOL/L — SIGNIFICANT CHANGE UP (ref 3.5–5)
POTASSIUM SERPL-MCNC: 4 MMOL/L — SIGNIFICANT CHANGE UP (ref 3.5–5)
POTASSIUM SERPL-MCNC: 4.2 MMOL/L — SIGNIFICANT CHANGE UP (ref 3.5–5)
POTASSIUM SERPL-MCNC: 4.2 MMOL/L — SIGNIFICANT CHANGE UP (ref 3.5–5)
POTASSIUM SERPL-SCNC: 4 MMOL/L — SIGNIFICANT CHANGE UP (ref 3.5–5)
POTASSIUM SERPL-SCNC: 4 MMOL/L — SIGNIFICANT CHANGE UP (ref 3.5–5)
POTASSIUM SERPL-SCNC: 4.2 MMOL/L — SIGNIFICANT CHANGE UP (ref 3.5–5)
POTASSIUM SERPL-SCNC: 4.2 MMOL/L — SIGNIFICANT CHANGE UP (ref 3.5–5)
RBC # BLD: 2.54 M/UL — LOW (ref 4.2–5.4)
RBC # BLD: 2.54 M/UL — LOW (ref 4.2–5.4)
RBC # BLD: 2.62 M/UL — LOW (ref 4.2–5.4)
RBC # BLD: 2.62 M/UL — LOW (ref 4.2–5.4)
RBC # FLD: 16.7 % — HIGH (ref 11.5–14.5)
RBC # FLD: 16.7 % — HIGH (ref 11.5–14.5)
RBC # FLD: 17.1 % — HIGH (ref 11.5–14.5)
RBC # FLD: 17.1 % — HIGH (ref 11.5–14.5)
SODIUM SERPL-SCNC: 137 MMOL/L — SIGNIFICANT CHANGE UP (ref 135–146)
SODIUM SERPL-SCNC: 137 MMOL/L — SIGNIFICANT CHANGE UP (ref 135–146)
SODIUM SERPL-SCNC: 138 MMOL/L — SIGNIFICANT CHANGE UP (ref 135–146)
SODIUM SERPL-SCNC: 138 MMOL/L — SIGNIFICANT CHANGE UP (ref 135–146)
WBC # BLD: 7.07 K/UL — SIGNIFICANT CHANGE UP (ref 4.8–10.8)
WBC # BLD: 7.07 K/UL — SIGNIFICANT CHANGE UP (ref 4.8–10.8)
WBC # BLD: 7.11 K/UL — SIGNIFICANT CHANGE UP (ref 4.8–10.8)
WBC # BLD: 7.11 K/UL — SIGNIFICANT CHANGE UP (ref 4.8–10.8)
WBC # FLD AUTO: 7.07 K/UL — SIGNIFICANT CHANGE UP (ref 4.8–10.8)
WBC # FLD AUTO: 7.07 K/UL — SIGNIFICANT CHANGE UP (ref 4.8–10.8)
WBC # FLD AUTO: 7.11 K/UL — SIGNIFICANT CHANGE UP (ref 4.8–10.8)
WBC # FLD AUTO: 7.11 K/UL — SIGNIFICANT CHANGE UP (ref 4.8–10.8)

## 2023-12-01 PROCEDURE — 99232 SBSQ HOSP IP/OBS MODERATE 35: CPT

## 2023-12-01 RX ORDER — POLYETHYLENE GLYCOL 3350 17 G/17G
17 POWDER, FOR SOLUTION ORAL DAILY
Refills: 0 | Status: DISCONTINUED | OUTPATIENT
Start: 2023-12-01 | End: 2023-12-04

## 2023-12-01 RX ORDER — ENOXAPARIN SODIUM 100 MG/ML
45 INJECTION SUBCUTANEOUS EVERY 12 HOURS
Refills: 0 | Status: DISCONTINUED | OUTPATIENT
Start: 2023-12-01 | End: 2023-12-04

## 2023-12-01 RX ORDER — METOPROLOL TARTRATE 50 MG
25 TABLET ORAL EVERY 12 HOURS
Refills: 0 | Status: DISCONTINUED | OUTPATIENT
Start: 2023-12-01 | End: 2023-12-04

## 2023-12-01 RX ADMIN — Medication 650 MILLIGRAM(S): at 05:25

## 2023-12-01 RX ADMIN — Medication 650 MILLIGRAM(S): at 19:20

## 2023-12-01 RX ADMIN — SENNA PLUS 2 TABLET(S): 8.6 TABLET ORAL at 21:03

## 2023-12-01 RX ADMIN — ATORVASTATIN CALCIUM 10 MILLIGRAM(S): 80 TABLET, FILM COATED ORAL at 21:02

## 2023-12-01 RX ADMIN — OXYCODONE HYDROCHLORIDE 5 MILLIGRAM(S): 5 TABLET ORAL at 21:03

## 2023-12-01 RX ADMIN — Medication 25 MILLIGRAM(S): at 18:11

## 2023-12-01 RX ADMIN — Medication 75 MICROGRAM(S): at 06:18

## 2023-12-01 RX ADMIN — ENOXAPARIN SODIUM 45 MILLIGRAM(S): 100 INJECTION SUBCUTANEOUS at 18:12

## 2023-12-01 RX ADMIN — Medication 650 MILLIGRAM(S): at 00:00

## 2023-12-01 RX ADMIN — Medication 650 MILLIGRAM(S): at 11:50

## 2023-12-01 RX ADMIN — Medication 25 MILLIGRAM(S): at 05:25

## 2023-12-01 RX ADMIN — OXYCODONE HYDROCHLORIDE 5 MILLIGRAM(S): 5 TABLET ORAL at 22:14

## 2023-12-01 RX ADMIN — Medication 650 MILLIGRAM(S): at 18:11

## 2023-12-01 RX ADMIN — Medication 650 MILLIGRAM(S): at 11:11

## 2023-12-01 NOTE — PROGRESS NOTE ADULT - ASSESSMENT
73y Female PMHx of Afib (on Xarelto), h/o DVT/PE, s/p IVC filter, HTN, hypothyroidism, COPD/emphysema, ex-smoker (quit >16 years ago) who s/p mechanical fall sustained a right acetabular fracture and retroperitoneal hematoma     #Rt acetabular fracture  - pain per primary team - oxy IR 5mg q6hrs, dialudid 0.5mg q4hrs prn  - Ortho and pt discussed risks and benefits of possible acetabular fixation, pt has opted for conservative management with no surgical intervention   - TTWB to the right leg - weight bearing status     #Hematoma  -serial h/h  -transfuse <7  -holding home xarelto; pt now on therapeutic lovenox  - recommend bid h/h trending, noted drifting down     #Afib  -currently rate controlled  -continue metoprolol  - cards risk stratification for OR appreciated     #HTN  -continue home lisinopril and metoprolol  -Goal <140/90    Pending: close monitoring of h/h, PT, OT consultation

## 2023-12-01 NOTE — PROGRESS NOTE ADULT - ATTENDING COMMENTS
Trauma/ACS Attending  Note Attestation    Patient is examined and evaluated at the bedside with the residents/PAs. Treatment plan discussed with the team, nurses, and consulting physicians and consulting teams. Medications, radiological studies and all other relevant studies reviewed.     MAURILIO HENSON Patient is a 73y old  Female who presents with a chief complaint of S/P mechanical fall (-HT, -LOC, +AC) with acetabular fracture       Vital Signs Last 24 Hrs  T(C): 36.9 (01 Dec 2023 07:38), Max: 37 (30 Nov 2023 20:00)  T(F): 98.4 (01 Dec 2023 07:38), Max: 98.6 (30 Nov 2023 20:00)  HR: 72 (01 Dec 2023 19:00) (60 - 90)  BP: 143/70 (01 Dec 2023 19:00) (107/52 - 147/83)  BP(mean): 100 (01 Dec 2023 19:00) (75 - 109)  RR: 21 (01 Dec 2023 19:00) (15 - 22)  SpO2: 99% (01 Dec 2023 19:00) (92% - 100%)    Parameters below as of 01 Dec 2023 19:00  Patient On (Oxygen Delivery Method): nasal cannula  O2 Flow (L/min): 2                          8.8    7.11  )-----------( 271      ( 01 Dec 2023 00:40 )             26.5     12-01    138  |  105  |  17  ----------------------------<  89  4.2   |  23  |  0.7    Ca    8.4      01 Dec 2023 00:40  Phos  4.6     12-01  Mg     2.2     12-01    TPro  5.4<L>  /  Alb  3.6  /  TBili  0.3  /  DBili  x   /  AST  19  /  ALT  11  /  AlkPhos  58  11-29    Diagnosis: Fall                   Acetabular fracture with pelvic hematoma     Plan:	  - supportive care  - pain management  - incentive spirometer   - DVT prophylaxis       [ x] SCDs [x ] Lovenox SQ [ ] Heparins SQ  [ ] None  - GI prophylaxis  - follow up consults -> orthopaedics, medicine and pulmonary    - repeat studies as needed  - PT evaluation and follow up  - replace electrolytes  - case management evaluation     Brigette Dong MD, FACS  Trauma/ACS/Surgical Critical Care Attending

## 2023-12-01 NOTE — PROGRESS NOTE ADULT - ASSESSMENT
Assessment & Plan    73y Female s/p mechanical fall, with acute R comminuted acetabular fracture.      NEURO:  #Acute pain  - tylenol standing   - oxycodone 2.5 mg q4 prn moderate pain   - oxycodone 5 mg Q6 PRN severe pain        RESP:   #Oxygenation    -wean off 2L NC to RA as tolerated  #Hx of emphysema (not on home O2)   -Continue spiriva   -Continue advair   #Activity    -Bedrest per ortho  -pulm: high risk for surgery. Keep inhalers     CARDS:   #Hematoma iliopsoas/retroperitoneal hematoma   - trend CBC stable x3 s/p 1 unit PRBCs. Hbg 10.0    - Echo pending, cardio clearance: hold xarelto 48 hrs prior to procedure    #Hx Afib   -Continue metoprolol 25 q12   -Holding home Xarelto     -Rate controlled  #Hx HTN   - Lisinopril 10 q12  Rx-lisinopril 10 two times a day  metoprolol tartrate 50 daily      GI/NUTR:   #Diet DASH Full       -aspiration precautions, HOB 30  #GI Prophylaxis    -not indicated  #Hx HLD    - Continue atorvastatin 10mg  #Bowel regimen    -senna    -last bowel movement unknown       /RENAL:   #urine output in crtically ill  IVL  Labs:          BUN/Cr- 19/0.8  -->,  20/0.7  -->          Electrolytes-Na 137 // K 4.3 // Mg 1.7 //  Phos 4.3 (11-30 @ 08:23)  #maintain euvolemia             HEME/ONC:   #Hx DVT/PE  - s/p IVC filter placed 10 years ago  #DVT prophylaxsis: lovenox 30  Hb/Hct:  9.3/28.2  -->,  10.2/30.9  -->,  10.0/30.4  -->  Plts:  358  -->,  329  -->,  305  -->    PTT/INR: --/1.16 (11-30)  37.8/1.55 (11-30)    T&S:ABO RH Interpretation:  (11-30)      obtain INR 2000      ENDO:  #Hx hypothyroidism   -Levothyroxine per patient schedule      -Glucose goal 140-180    -if above 180 start ISS    MSK:  #R comminuted acetabular fx   -Ortho following    -F/u surgical plan   -Needs medical clearance/risk stratification; cardio, pulm consult placed     Activity - Weight Bearing Status:     Left Lower Extremity:  Non-Weight Bearing    Right Lower Extremity:  Non-Weight Bearing (11-30-23 @ 04:00)  Activity - Bedrest (11-30-23 @ 04:00)  R knee and R elbow xrays: no fracture     LINES/DRAINS:  PIV    ADVANCED DIRECTIVES:  Full Code    HCP/Emergency Contact-    INDICATION FOR SICU: Hypotension s/p mechanical fall      DISPO: Surgical floor

## 2023-12-01 NOTE — PROGRESS NOTE ADULT - SUBJECTIVE AND OBJECTIVE BOX
Patient is a 73y old  Female who presents with a chief complaint of S/P mechanical fall (-HT, -LOC, +AC) (01 Dec 2023 03:39)      Patient seen and examined at bedside.  Patient denies any chest pain or shortness of breath   ALLERGIES:  No Known Allergies    MEDICATIONS:  acetaminophen     Tablet .. 650 milliGRAM(s) Oral every 6 hours  atorvastatin 10 milliGRAM(s) Oral at bedtime  budesonide 160 MICROgram(s)/formoterol 4.5 MICROgram(s) Inhaler 2 Puff(s) Inhalation two times a day  chlorhexidine 2% Cloths 1 Application(s) Topical daily  cyanocobalamin 1000 MICROGram(s) Oral daily  enoxaparin Injectable 45 milliGRAM(s) SubCutaneous every 12 hours  folic acid 1 milliGRAM(s) Oral daily  levothyroxine 75 MICROGram(s) Oral daily  levothyroxine 50 MICROGram(s) Oral daily  metoprolol tartrate 25 milliGRAM(s) Oral every 12 hours  oxyCODONE    IR 2.5 milliGRAM(s) Oral every 4 hours PRN  oxyCODONE    IR 5 milliGRAM(s) Oral every 6 hours PRN  polyethylene glycol 3350 17 Gram(s) Oral daily  senna 2 Tablet(s) Oral at bedtime  tiotropium 2.5 MICROgram(s) Inhaler 2 Puff(s) Inhalation daily    Vital Signs Last 24 Hrs  T(F): 98.4 (01 Dec 2023 07:38), Max: 98.6 (30 Nov 2023 20:00)  HR: 81 (01 Dec 2023 15:00) (60 - 90)  BP: 141/67 (01 Dec 2023 15:00) (107/52 - 147/83)  RR: 20 (01 Dec 2023 15:00) (15 - 22)  SpO2: 98% (01 Dec 2023 15:00) (92% - 100%)  I&O's Summary    30 Nov 2023 07:01  -  01 Dec 2023 07:00  --------------------------------------------------------  IN: 350 mL / OUT: 500 mL / NET: -150 mL        PHYSICAL EXAM:  General: NAD, A/O x 3  ENT: MMM  Neck: Supple, No JVD  Lungs: diminished bilaterally, no wheezing   Cardio: RRR, S1/S2, 2/6 systolic murmur   Abdomen: Soft, Nontender, Nondistended; Bowel sounds present  Extremities: No cyanosis, No edema    LABS:                        8.8    7.11  )-----------( 271      ( 01 Dec 2023 00:40 )             26.5     12-01    138  |  105  |  17  ----------------------------<  89  4.2   |  23  |  0.7    Ca    8.4      01 Dec 2023 00:40  Phos  4.6     12-01  Mg     2.2     12-01    TPro  5.4  /  Alb  3.6  /  TBili  0.3  /  DBili  x   /  AST  19  /  ALT  11  /  AlkPhos  58  11-29      PT/INR - ( 30 Nov 2023 22:12 )   PT: 13.20 sec;   INR: 1.16 ratio         PTT - ( 30 Nov 2023 08:23 )  PTT:37.8 sec                          Urinalysis Basic - ( 01 Dec 2023 00:40 )    Color: x / Appearance: x / SG: x / pH: x  Gluc: 89 mg/dL / Ketone: x  / Bili: x / Urobili: x   Blood: x / Protein: x / Nitrite: x   Leuk Esterase: x / RBC: x / WBC x   Sq Epi: x / Non Sq Epi: x / Bacteria: x            RADIOLOGY & ADDITIONAL TESTS:    Care Discussed with Consultants/Other Providers:

## 2023-12-01 NOTE — PROGRESS NOTE ADULT - SUBJECTIVE AND OBJECTIVE BOX
GENERAL SURGERY PROGRESS NOTE     REMEDIOSJASMINMAURILIO YOUNG  73y  Female  Hospital day :2d    OVERNIGHT EVENTS:  - Hemodynamically stable  - No acute events overnight  - Pain under control    T(F): 98.5 (12-01-23 @ 00:00), Max: 98.6 (11-30-23 @ 20:00)  HR: 64 (12-01-23 @ 00:00) (57 - 77)  BP: 107/52 (12-01-23 @ 00:00) (107/52 - 176/77)  RR: 16 (12-01-23 @ 00:00) (15 - 19)  SpO2: 96% (12-01-23 @ 00:00) (92% - 100%)    DIET/FLUIDS: cyanocobalamin 1000 MICROGram(s) Oral daily  folic acid 1 milliGRAM(s) Oral daily    AC/ proph: enoxaparin Injectable 40 milliGRAM(s) SubCutaneous every 24 hours    PHYSICAL EXAM:  GENERAL: NAD  CHEST/LUNG: Clear to auscultation bilaterally  HEART: Regular rate and rhythm  ABDOMEN: Soft, Nontender, Nondistended;       LABS  Labs:  CAPILLARY BLOOD GLUCOSE      POCT Blood Glucose.: 96 mg/dL (30 Nov 2023 12:03)                          8.8    7.11  )-----------( 271      ( 01 Dec 2023 00:40 )             26.5       Auto Neutrophil %: 71.3 % (12-01-23 @ 00:40)  Auto Immature Granulocyte %: 0.3 % (12-01-23 @ 00:40)  Auto Neutrophil %: 79.3 % (11-30-23 @ 08:24)  Auto Immature Granulocyte %: 0.5 % (11-30-23 @ 08:24)    12-01    138  |  105  |  17  ----------------------------<  89  4.2   |  23  |  0.7      Calcium: 8.4 mg/dL (12-01-23 @ 00:40)      LFTs:             5.4  | 0.3  | 19       ------------------[58      ( 29 Nov 2023 20:28 )  3.6  | x    | 11            Coags:     13.20  ----< 1.16    ( 30 Nov 2023 22:12 )     x           CARDIAC MARKERS ( 29 Nov 2023 20:28 )  x     / <0.01 ng/mL / x     / x     / x              Urinalysis Basic - ( 01 Dec 2023 00:40 )    Color: x / Appearance: x / SG: x / pH: x  Gluc: 89 mg/dL / Ketone: x  / Bili: x / Urobili: x   Blood: x / Protein: x / Nitrite: x   Leuk Esterase: x / RBC: x / WBC x   Sq Epi: x / Non Sq Epi: x / Bacteria: x      RADIOLOGY & ADDITIONAL TESTS:  < from: Xray Knee 1 or 2 Views, Right (11.30.23 @ 10:23) >  Findings/  impression:  No acute fracture or dislocation. Diffuse osteopenia. Moderate medial   compartment and mild lateral and patellofemoral compartment   osteoarthritis. Trace knee joint fluid. There are vascular calcifications.           GENERAL SURGERY PROGRESS NOTE     REMEDIOSJASMINMAURILIO YOUNG  73y  Female  Hospital day :2d    24 Hour Events  12/1   DAY  -Started Therapeutic Lovenox while Xarelto on hold  -added miralax   -[facility] pt and familuy still deciding. c/w DVT pphx, OOBTC TTWB RLE    11/30  DAY  -CBC stable, started lovenox dvt pphx,   -diet advanced to DASH diet  -obtain INR at 2000  -started LR 50 cc  -R knee xray : no fracture   -Mg 1.7>2 grams ordered  -ortho recs pending medical risk stratification  -preop: cbc, cmp, coags, t/sx2, cxr, ekg, covid          -cards: hold xarelto 48 hrs prior to procedure         -pulm: high risk for surgery. Keep inhalers   -pain regimen adjusted: standing tylenol, dilaudid DC'd, oxycodone 5 Q4 PRN severe pain, oxy 2.5 prn moderate pain       T(F): 98.5 (12-01-23 @ 00:00), Max: 98.6 (11-30-23 @ 20:00)  HR: 64 (12-01-23 @ 00:00) (57 - 77)  BP: 107/52 (12-01-23 @ 00:00) (107/52 - 176/77)  RR: 16 (12-01-23 @ 00:00) (15 - 19)  SpO2: 96% (12-01-23 @ 00:00) (92% - 100%)    DIET/FLUIDS: cyanocobalamin 1000 MICROGram(s) Oral daily  folic acid 1 milliGRAM(s) Oral daily    AC/ proph: enoxaparin Injectable 40 milliGRAM(s) SubCutaneous every 24 hours    PHYSICAL EXAM:  GENERAL: NAD  CHEST/LUNG: Clear to auscultation bilaterally  HEART: Regular rate and rhythm  ABDOMEN: Soft, Nontender, Nondistended;   MSK: RLE pain at hip; R knee and R elbow abrasion       LABS  Labs:  CAPILLARY BLOOD GLUCOSE      POCT Blood Glucose.: 96 mg/dL (30 Nov 2023 12:03)                          8.8    7.11  )-----------( 271      ( 01 Dec 2023 00:40 )             26.5       Auto Neutrophil %: 71.3 % (12-01-23 @ 00:40)  Auto Immature Granulocyte %: 0.3 % (12-01-23 @ 00:40)  Auto Neutrophil %: 79.3 % (11-30-23 @ 08:24)  Auto Immature Granulocyte %: 0.5 % (11-30-23 @ 08:24)    12-01    138  |  105  |  17  ----------------------------<  89  4.2   |  23  |  0.7      Calcium: 8.4 mg/dL (12-01-23 @ 00:40)      LFTs:             5.4  | 0.3  | 19       ------------------[58      ( 29 Nov 2023 20:28 )  3.6  | x    | 11            Coags:     13.20  ----< 1.16    ( 30 Nov 2023 22:12 )     x           CARDIAC MARKERS ( 29 Nov 2023 20:28 )  x     / <0.01 ng/mL / x     / x     / x              Urinalysis Basic - ( 01 Dec 2023 00:40 )    Color: x / Appearance: x / SG: x / pH: x  Gluc: 89 mg/dL / Ketone: x  / Bili: x / Urobili: x   Blood: x / Protein: x / Nitrite: x   Leuk Esterase: x / RBC: x / WBC x   Sq Epi: x / Non Sq Epi: x / Bacteria: x      RADIOLOGY & ADDITIONAL TESTS:  < from: Xray Knee 1 or 2 Views, Right (11.30.23 @ 10:23) >  Findings/  impression:  No acute fracture or dislocation. Diffuse osteopenia. Moderate medial   compartment and mild lateral and patellofemoral compartment   osteoarthritis. Trace knee joint fluid. There are vascular calcifications.           GENERAL SURGERY PROGRESS NOTE     MAURILIO HENSON  73y Female  Hospital day :2d    24 Hour Events  12/1   DAY  -Started Therapeutic Lovenox while Xarelto on hold  -added Miralax   -ortho: high risk procedure pt and familuy still deciding. c/w DVT pphx, OOBTC TTWB RLE    11/30  DAY  -CBC stable, started lovenox dvt pphx,   -diet advanced to DASH diet  -obtain INR at 2000  -started LR 50 cc  -R knee xray : no fracture   -Mg 1.7>2 grams ordered  -ortho recs pending medical risk stratification  -preop: cbc, cmp, coags, t/sx2, cxr, ekg, covid          -cards: hold xarelto 48 hrs prior to procedure         -pulm: high risk for surgery. Keep inhalers   -pain regimen adjusted: standing tylenol, dilaudid DC'd, oxycodone 5 Q4 PRN severe pain, oxy 2.5 prn moderate pain       T(F): 98.5 (12-01-23 @ 00:00), Max: 98.6 (11-30-23 @ 20:00)  HR: 64 (12-01-23 @ 00:00) (57 - 77)  BP: 107/52 (12-01-23 @ 00:00) (107/52 - 176/77)  RR: 16 (12-01-23 @ 00:00) (15 - 19)  SpO2: 96% (12-01-23 @ 00:00) (92% - 100%)    DIET/FLUIDS: cyanocobalamin 1000 MICROGram(s) Oral daily  folic acid 1 milliGRAM(s) Oral daily    AC/ proph: enoxaparin Injectable 40 milliGRAM(s) SubCutaneous every 24 hours    PHYSICAL EXAM:  GENERAL: NAD  CHEST/LUNG: Clear to auscultation bilaterally  HEART: Regular rate and rhythm  ABDOMEN: Soft, Nontender, Nondistended;   MSK: RLE pain at hip; R knee and R elbow abrasion       LABS  CAPILLARY BLOOD GLUCOSE  POCT Blood Glucose.: 96 mg/dL (30 Nov 2023 12:03)                          8.8    7.11  )-----------( 271      ( 01 Dec 2023 00:40 )             26.5       Auto Neutrophil %: 71.3 % (12-01-23 @ 00:40)  Auto Immature Granulocyte %: 0.3 % (12-01-23 @ 00:40)  Auto Neutrophil %: 79.3 % (11-30-23 @ 08:24)  Auto Immature Granulocyte %: 0.5 % (11-30-23 @ 08:24)    12-01    138  |  105  |  17  ----------------------------<  89  4.2   |  23  |  0.7  Calcium: 8.4 mg/dL (12-01-23 @ 00:40)    LFTs:       5.4  | 0.3  | 19       ------------------[58      ( 29 Nov 2023 20:28 )  3.6  | x    | 11          Coags:  13.20  ----< 1.16    ( 30 Nov 2023 22:12 )     x         CARDIAC MARKERS ( 29 Nov 2023 20:28 )  x     / <0.01 ng/mL / x     / x     / x        Urinalysis Basic - ( 01 Dec 2023 00:40 )    Color: x / Appearance: x / SG: x / pH: x  Gluc: 89 mg/dL / Ketone: x  / Bili: x / Urobili: x   Blood: x / Protein: x / Nitrite: x   Leuk Esterase: x / RBC: x / WBC x   Sq Epi: x / Non Sq Epi: x / Bacteria: x      RADIOLOGY & ADDITIONAL TESTS:  < from: Xray Knee 1 or 2 Views, Right (11.30.23 @ 10:23) >  Findings/  impression:  No acute fracture or dislocation. Diffuse osteopenia. Moderate medial   compartment and mild lateral and patellofemoral compartment   osteoarthritis. Trace knee joint fluid. There are vascular calcifications.

## 2023-12-01 NOTE — PROGRESS NOTE ADULT - ATTENDING COMMENTS
This patient has a high probability of sudden, clinically significant deterioration, which requires the highest level of physician preparedness to intervene urgently. I managed/supervised life or organ supporting interventions that required frequent physician assessment. I devoted my full attention in the ICU to the direct care of this patient for the period of time indicated below. Time I spent with the family or surrogate(s) is included only if the patient was incapable of providing the necessary information or participating in medical decision making. Time devoted to teaching and to any procedures I billed separately is not included.     73y Female PMHx of Afib (on Xarelto), h/o DVT/PE, s/p IVC filter, HTN, hypothyroidism, COPD/emphysema, ex-smoker (quit >16 years ago) who s/p mechanical fall (-HT -LOC +AC) admitted to SICU. Received Kcentra and one unit pRBC at [facility] before transferred to [place] for trauma evaluation.  Injuries: R acetabular fracture, retroperitoneal hematoma, old left pubic rami fx    Patient is examined and evaluated at the bedside with SICU team. Treatment plan discussed with SICU team, nurses and primary team.   Chest X-ray and all relevant studies reviewed during rounds.  Will continue hemodynamic monitoring as per protocol in SICU.    Neuro:  alert, oriented, neurovascularly intact distally  Medications -  oxy 2.5/5 sliding scale, standing tylenol     Respiratory: RR 22 SaO2 96% on RA  Medications - spiriva, symbicort    CV: HR 77 /64 MAP 92  Medications - metoprolol 25mg BID, atorvastatin, start therapeutic lovenox for hx of DVT/PE  Home medications - metoprolol XL 50mg, lisinopril 10mg, atorvastatin 10mg, xarelto    GI: abd s/nt/nd  Last BM- two days ago  Nutrition - DASH diet  Medications - senna, add miralax   Ppx - not indicated      Renal: Continue I&Os monitoring, replete electrolytes as needed  12-01    138  |  105  |  17  ----------------------------<  89  4.2   |  23  |  0.7    Ca    8.4      01 Dec 2023 00:40  Phos  4.6     12-01  Mg     2.2     12-01    TPro  5.4<L>  /  Alb  3.6  /  TBili  0.3  /  DBili  x   /  AST  19  /  ALT  11  /  AlkPhos  58  11-29    UOP - 500 cc (likely not accurate)  I/O - 350/500       Heme: continue to evaluate for acute blood loss anemia- trend Hg/Hct                         8.8    7.11  )-----------( 271      ( 01 Dec 2023 00:40 )             26.5     Anticoagulation - lovenox, start therapeutic lovenox     ID: trend WBC, afebrile  Antibiotics - none    Endocrine: Prevent and treat hyperglycemia with insulin as needed, synthroid for hypothyroidism    PV: follow pulse exam    MSK: NWB B/L LE    Skin: decub precautions    Lines: PIV   DVT Prophylaxis: start BID therapeutic lovenox   Stress Gastritis Prophylaxis: not indicated  Disposition: transfer to floor    I saw and evaluated the patient personally. I have reviewed and agree with note above. Treatment plan discussed with SICU team, nurses and primary team at the time of the multidisciplinary rounds.     Kush May MD  Trauma/ACS/SCC Attending This patient has a high probability of sudden, clinically significant deterioration, which requires the highest level of physician preparedness to intervene urgently. I managed/supervised life or organ supporting interventions that required frequent physician assessment. I devoted my full attention in the ICU to the direct care of this patient for the period of time indicated below. Time I spent with the family or surrogate(s) is included only if the patient was incapable of providing the necessary information or participating in medical decision making. Time devoted to teaching and to any procedures I billed separately is not included.     73y Female PMHx of Afib (on Xarelto), h/o DVT/PE, s/p IVC filter, HTN, hypothyroidism, COPD/emphysema, ex-smoker (quit >16 years ago) who s/p mechanical fall (-HT -LOC +AC) admitted to SICU. Received Kcentra and one unit pRBC at [facility] before transferred to [place] for trauma evaluation.  Injuries: R acetabular fracture, retroperitoneal hematoma, old left pubic rami fx    Patient is examined and evaluated at the bedside with SICU team. Treatment plan discussed with SICU team, nurses and primary team.   Chest X-ray and all relevant studies reviewed during rounds.  Will continue hemodynamic monitoring as per protocol in SICU.    Neuro:  alert, oriented, neurovascularly intact distally  Medications -  oxy 2.5/5 sliding scale, standing tylenol     Respiratory: RR 22 SaO2 96% on RA  Medications - spiriva, symbicort    CV: HR 77 /64 MAP 92  Medications - metoprolol 25mg BID, atorvastatin, start therapeutic lovenox for hx of DVT/PE  Home medications - metoprolol XL 50mg, lisinopril 10mg, atorvastatin 10mg, xarelto    GI: abd s/nt/nd  Last BM- two days ago  Nutrition - DASH diet  Medications - senna, add miralax   Ppx - not indicated      Renal: Continue I&Os monitoring, replete electrolytes as needed  12-01    138  |  105  |  17  ----------------------------<  89  4.2   |  23  |  0.7    Ca    8.4      01 Dec 2023 00:40  Phos  4.6     12-01  Mg     2.2     12-01    TPro  5.4<L>  /  Alb  3.6  /  TBili  0.3  /  DBili  x   /  AST  19  /  ALT  11  /  AlkPhos  58  11-29    UOP - 500 cc (likely not accurate)  I/O - 350/500       Heme: continue to evaluate for acute blood loss anemia (retroperitoneal hematoma) - trend Hg/Hct                         8.8    7.11  )-----------( 271      ( 01 Dec 2023 00:40 )             26.5     Anticoagulation - lovenox, start therapeutic lovenox     ID: trend WBC, afebrile  Antibiotics - none    Endocrine: Prevent and treat hyperglycemia with insulin as needed, synthroid for hypothyroidism    PV: follow pulse exam    MSK: NWB B/L LE    Skin: decub precautions    Lines: PIV   DVT Prophylaxis: start BID therapeutic lovenox   Stress Gastritis Prophylaxis: not indicated  Disposition: transfer to floor    I saw and evaluated the patient personally. I have reviewed and agree with note above. Treatment plan discussed with SICU team, nurses and primary team at the time of the multidisciplinary rounds.     Kush May MD  Trauma/ACS/SCC Attending

## 2023-12-01 NOTE — PROGRESS NOTE ADULT - SUBJECTIVE AND OBJECTIVE BOX
MAURILIO HENSON   [id number]   [birth year]  73yF    Admit Date: 11-30-23  Indication for SDU/SICU:  OR #1-        ============================  HPI   HPI: 73F w/ PMHx of Afib (on Xarelto), h/o DVT/PE, s/p IVC filter, HTN, hypothyroidism, COPD/emphysema, ex-smoker (quit >16 years ago) who presents as a TRAUMA ALERT (transfer from [facility] ED) s/p mechanical fall (-HT -LOC +AC). Patient reportedly slipped and fell while in the kitchen yesterday afternoon. Complaining of right hip pain and inability to ambulate. She is accompanied by her  and son. She does not use an assistive device at baseline. Of note, patient has a h/o x2 prior falls, resulting in left pubic ramus fracture that was treated nonoperatively. Denies pain elsewhere. Denies any numbness or tingling. Reports her last Xarelto dose was the evening prior to presentation (11/28 PM).     Hypotensive on arrival to [facility], s/p Kcentra and 1u PRBC. Patient examined in [place] ED, hemodynamically stable. Complaining of pain only when moving RLE. Work-up demonstrated R comminuted acetabular fracture.    Imaging:  CTA pelvis: No evidence of active arterial hemorrhage. Redemonstration of an acute comminuted displaced right acetabular fracture with adjacent iliopsoas/retroperitoneal hematoma extending superiorly anterior to the right iliac bone.  CT chest: Negative for acute pathology. Patchy areas of subsegmental consolidation right lower lobe and peripheral right middle lobe   CT abdomen: Negative for acute pathology.  CT head: Negative for acute pathology.  CT cervical spine: Negative for acute pathology. Degenerative disc changes.   R Elbow xray: negative  R knee xyay: negative     24 Hour Events  11/30  DAY  -CBC stable, started lovenox dvt pphx,   -diet advanced to DASH diet  -obtain INR at 2000  -started LR 50 cc  -R knee xray : no fracture   -Mg 1.7>2 grams ordered  -ortho recs pending medical risk stratification  -preop: cbc, cmp, coags, t/sx2, cxr, ekg, covid          -cards: hold xarelto 48 hrs prior to procedure         -pulm: high risk for surgery. Keep inhalers   -pain regimen adjusted: standing tylenol, dilaudid DC'd, oxycodone 5 Q4 PRN severe pain, oxy 2.5 prn moderate pain     [X] A ten-point review of systems was otherwise negative except as noted above.  [  ] Due to altered mental status/intubation, subjective information was not attained from the patient. History was obtained, to the extent possible, from review of the chart and collateral sources of information.    24 Hour Events  11/30  NIGHT  -vitals WNL, no acute events, pain under control    DAY  -CBC stable, started lovenox dvt pphx,   -diet advanced to DASH diet  -obtain INR at 2000  -started LR 50 cc  -R knee xray : no fracture   -Mg 1.7>2 grams ordered  -ortho recs pending medical risk stratification  -preop: cbc, cmp, coags, t/sx2, cxr, ekg, covid          -cards: hold xarelto 48 hrs prior to procedure         -pulm: high risk for surgery. Keep inhalers   -pain regimen adjusted: standing tylenol, dilaudid DC'd, oxycodone 5 Q4 PRN severe pain, oxy 2.5 prn moderate pain   - started lovenox 40 qd  -IVL

## 2023-12-01 NOTE — CHART NOTE - NSCHARTNOTEFT_GEN_A_CORE
Dr Luna had a lengthy discussion with the pt and  regarding surgical and conservative options for this complex acetabular fx .  the medical team has indicated the pt is high risk for a high risk procedure   the pt and family will take a few days to review their options   in the interim continue deep vein thrombosis prophylaxis, oob to chair   have therapy attempt to ambulate with the pt ttwb to the rle

## 2023-12-01 NOTE — CHART NOTE - NSCHARTNOTEFT_GEN_A_CORE
SICU Transfer Note    MAURILIO HENSON  73y (1950)  [id number]      Transfer from: SICU  Transfer to: Surgery-    ============================  HPI   HPI: 73F w/ PMHx of Afib (on Xarelto), h/o DVT/PE, s/p IVC filter, HTN, hypothyroidism, COPD/emphysema, ex-smoker (quit >16 years ago) who presents as a TRAUMA ALERT (transfer from [facility] ED) s/p mechanical fall (-HT -LOC +AC). Patient reportedly slipped and fell while in the kitchen yesterday afternoon. Complaining of right hip pain and inability to ambulate. She is accompanied by her  and son. She does not use an assistive device at baseline. Of note, patient has a h/o x2 prior falls, resulting in left pubic ramus fracture that was treated nonoperatively. Denies pain elsewhere. Denies any numbness or tingling. Reports her last Xarelto dose was the evening prior to presentation (11/28 PM).     Hypotensive on arrival to [facility], s/p Kcentra and 1u PRBC. Patient examined in [place] ED, hemodynamically stable. Complaining of pain only when moving RLE. Work-up demonstrated R comminuted acetabular fracture.    Imaging:  CTA pelvis: No evidence of active arterial hemorrhage. Redemonstration of an acute comminuted displaced right acetabular fracture with adjacent iliopsoas/retroperitoneal hematoma extending superiorly anterior to the right iliac bone.  CT chest: Negative for acute pathology. Patchy areas of subsegmental consolidation right lower lobe and peripheral right middle lobe   CT abdomen: Negative for acute pathology.  CT head: Negative for acute pathology.  CT cervical spine: Negative for acute pathology. Degenerative disc changes.   Elbow xray: negative    SICU COURSE:  while in SICU, risk/benefit risk stratification considered, deemed high risk for high risk procedure per pulm and cardio. Awaiting final orthopedic surgery recommendations. CBC stable in light of hematoma, started on therapeutic lovenox while holding xarelto. pain regimen adjusted: standing tylenol, dilaudid DC'd, oxycodone 5 Q4 PRN severe pain, oxy 2.5 prn moderate pain. NWB       PAST MEDICAL & SURGICAL HISTORY:  Atrial fibrillation      Hypothyroid      HTN (hypertension)      Hyperlipidemia      History of blood clotting disorder      S/P IVC filter        Allergies    No Known Allergies    Intolerances      MEDICATIONS  (STANDING):  acetaminophen     Tablet .. 650 milliGRAM(s) Oral every 6 hours  atorvastatin 10 milliGRAM(s) Oral at bedtime  budesonide 160 MICROgram(s)/formoterol 4.5 MICROgram(s) Inhaler 2 Puff(s) Inhalation two times a day  chlorhexidine 2% Cloths 1 Application(s) Topical daily  cyanocobalamin 1000 MICROGram(s) Oral daily  enoxaparin Injectable 45 milliGRAM(s) SubCutaneous every 12 hours  folic acid 1 milliGRAM(s) Oral daily  levothyroxine 75 MICROGram(s) Oral daily  levothyroxine 50 MICROGram(s) Oral daily  metoprolol tartrate 25 milliGRAM(s) Oral every 12 hours  senna 2 Tablet(s) Oral at bedtime  tiotropium 2.5 MICROgram(s) Inhaler 2 Puff(s) Inhalation daily    MEDICATIONS  (PRN):  oxyCODONE    IR 2.5 milliGRAM(s) Oral every 4 hours PRN Moderate Pain (4 - 6)  oxyCODONE    IR 5 milliGRAM(s) Oral every 6 hours PRN Severe Pain (7 - 10)      Vital Signs Last 24 Hrs  T(C): 36.9 (01 Dec 2023 07:38), Max: 37 (30 Nov 2023 20:00)  T(F): 98.4 (01 Dec 2023 07:38), Max: 98.6 (30 Nov 2023 20:00)  HR: 71 (01 Dec 2023 10:00) (58 - 77)  BP: 138/64 (01 Dec 2023 10:00) (107/52 - 176/77)  BP(mean): 92 (01 Dec 2023 10:00) (75 - 120)  RR: 20 (01 Dec 2023 10:00) (15 - 20)  SpO2: 96% (01 Dec 2023 10:00) (93% - 100%)    Parameters below as of 01 Dec 2023 10:00  Patient On (Oxygen Delivery Method): room air      I&O's Summary    30 Nov 2023 07:01  -  01 Dec 2023 07:00  --------------------------------------------------------  IN: 350 mL / OUT: 500 mL / NET: -150 mL        LABS  LABS:                        8.8    7.11  )-----------( 271      ( 01 Dec 2023 00:40 )             26.5       12-01    138  |  105  |  17  ----------------------------<  89  4.2   |  23  |  0.7    Ca    8.4      01 Dec 2023 00:40  Phos  4.6     12-01  Mg     2.2     12-01    TPro  5.4<L>  /  Alb  3.6  /  TBili  0.3  /  DBili  x   /  AST  19  /  ALT  11  /  AlkPhos  58  11-29      PT/INR - ( 30 Nov 2023 22:12 )   PT: 13.20 sec;   INR: 1.16 ratio         PTT - ( 30 Nov 2023 08:23 )  PTT:37.8 sec  CARDIAC MARKERS ( 29 Nov 2023 20:28 )  x     / <0.01 ng/mL / x     / x     / x              bnp    Urinalysis Basic - ( 01 Dec 2023 00:40 )    Color: x / Appearance: x / SG: x / pH: x  Gluc: 89 mg/dL / Ketone: x  / Bili: x / Urobili: x   Blood: x / Protein: x / Nitrite: x   Leuk Esterase: x / RBC: x / WBC x   Sq Epi: x / Non Sq Epi: x / Bacteria: x                Assessment & Plan:  Assessment & Plan    73y Female s/p mechanical fall, with acute R comminuted acetabular fracture.      NEURO:  #Acute pain  - tylenol standing   - oxycodone 2.5 mg q4 prn moderate pain   - oxycodone 5 mg Q6 PRN severe pain        RESP:   #Oxygenation    RA   #Hx of emphysema (not on home O2)   -Continue spiriva   -Continue advair   #Activity    -Bedrest per ortho  -pulm: high risk for surgery. Keep inhalers       CARDS:   #Hematoma iliopsoas/retroperitoneal hematoma   - trend CBC stable x3 s/p 1 unit PRBCs. Hbg 10.0    - Echo pending, cardio clearance: hold xarelto 48 hrs prior to procedure    #Hx Afib   -Continue metoprolol 25 q12   -Holding home Xarelto  (last dose 11/28/23)   -Rate controlled  #Hx HTN   - Lisinopril 10 q12 held   Rx-lisinopril 10 two times a day  metoprolol tartrate 50 daily      GI/NUTR:   #Diet DASH Full       -aspiration precautions, HOB 30  #GI Prophylaxis    -not indicated  #Hx HLD    - Continue atorvastatin 10mg  #Bowel regimen    -senna    -miralax started 12/1     -last bowel movement Wednesday        /RENAL:   #urine output in critically ill  -primafit   IVL    Labs:          BUN/Cr- 19/0.8  -->          Electrolytes-Na 138 // K 3.8 // Mg 1.9 //  Phos -- (11-29 @ 20:28)  #maintain euvolemia             HEME/ONC:   #Hx DVT/PE  - s/p IVC filter placed 10 years ago  #DVT pphx: therepauetic lovenox     Labs: Hb/Hct:  10.8/32.2  (11-29 @ 20:28)  -->,  9.3/28.2  (11-29 @ 22:49)  -->                      Plts:  413  -->,  358  -->                 PTT/INR:  43.4/2.13  --->       ENDO:  #Hx hypothyroidism   -Levothyroxine per patient schedule    -Glucose goal 140-180    -if above 180 start ISS    MSK:  #R comminuted acetabular fx   -Ortho following    -F/u surgical plan   -cardio and pulm risk stratification high risk   - Activity - Weight Bearing Status:     Left Lower Extremity:  Non-Weight Bearing    Right Lower Extremity:  Non-Weight Bearing (11-30-23 @ 04:00)  Activity - Bedrest (11-30-23 @ 04:00)  R knee and R elbow xrays: no fracture     LINES/DRAINS:  PIV      Follow Up:  -2000 labs  -ortho recs   -resuming home xarelto   -bowel movement   -dispo planning       Signed out to:  Date: Dec 1, 2023   Time:

## 2023-12-02 LAB
ANION GAP SERPL CALC-SCNC: 10 MMOL/L — SIGNIFICANT CHANGE UP (ref 7–14)
ANION GAP SERPL CALC-SCNC: 10 MMOL/L — SIGNIFICANT CHANGE UP (ref 7–14)
ANION GAP SERPL CALC-SCNC: 6 MMOL/L — LOW (ref 7–14)
ANION GAP SERPL CALC-SCNC: 6 MMOL/L — LOW (ref 7–14)
BASOPHILS # BLD AUTO: 0.03 K/UL — SIGNIFICANT CHANGE UP (ref 0–0.2)
BASOPHILS # BLD AUTO: 0.03 K/UL — SIGNIFICANT CHANGE UP (ref 0–0.2)
BASOPHILS NFR BLD AUTO: 0.4 % — SIGNIFICANT CHANGE UP (ref 0–1)
BASOPHILS NFR BLD AUTO: 0.4 % — SIGNIFICANT CHANGE UP (ref 0–1)
BUN SERPL-MCNC: 11 MG/DL — SIGNIFICANT CHANGE UP (ref 10–20)
BUN SERPL-MCNC: 11 MG/DL — SIGNIFICANT CHANGE UP (ref 10–20)
BUN SERPL-MCNC: 12 MG/DL — SIGNIFICANT CHANGE UP (ref 10–20)
BUN SERPL-MCNC: 12 MG/DL — SIGNIFICANT CHANGE UP (ref 10–20)
CALCIUM SERPL-MCNC: 8.3 MG/DL — LOW (ref 8.4–10.5)
CALCIUM SERPL-MCNC: 8.3 MG/DL — LOW (ref 8.4–10.5)
CALCIUM SERPL-MCNC: 8.4 MG/DL — SIGNIFICANT CHANGE UP (ref 8.4–10.5)
CALCIUM SERPL-MCNC: 8.4 MG/DL — SIGNIFICANT CHANGE UP (ref 8.4–10.5)
CHLORIDE SERPL-SCNC: 102 MMOL/L — SIGNIFICANT CHANGE UP (ref 98–110)
CHLORIDE SERPL-SCNC: 102 MMOL/L — SIGNIFICANT CHANGE UP (ref 98–110)
CHLORIDE SERPL-SCNC: 105 MMOL/L — SIGNIFICANT CHANGE UP (ref 98–110)
CHLORIDE SERPL-SCNC: 105 MMOL/L — SIGNIFICANT CHANGE UP (ref 98–110)
CO2 SERPL-SCNC: 24 MMOL/L — SIGNIFICANT CHANGE UP (ref 17–32)
CO2 SERPL-SCNC: 24 MMOL/L — SIGNIFICANT CHANGE UP (ref 17–32)
CO2 SERPL-SCNC: 26 MMOL/L — SIGNIFICANT CHANGE UP (ref 17–32)
CO2 SERPL-SCNC: 26 MMOL/L — SIGNIFICANT CHANGE UP (ref 17–32)
CREAT SERPL-MCNC: 0.5 MG/DL — LOW (ref 0.7–1.5)
CREAT SERPL-MCNC: 0.5 MG/DL — LOW (ref 0.7–1.5)
CREAT SERPL-MCNC: 0.6 MG/DL — LOW (ref 0.7–1.5)
CREAT SERPL-MCNC: 0.6 MG/DL — LOW (ref 0.7–1.5)
EGFR: 95 ML/MIN/1.73M2 — SIGNIFICANT CHANGE UP
EGFR: 95 ML/MIN/1.73M2 — SIGNIFICANT CHANGE UP
EGFR: 99 ML/MIN/1.73M2 — SIGNIFICANT CHANGE UP
EGFR: 99 ML/MIN/1.73M2 — SIGNIFICANT CHANGE UP
EOSINOPHIL # BLD AUTO: 0.07 K/UL — SIGNIFICANT CHANGE UP (ref 0–0.7)
EOSINOPHIL # BLD AUTO: 0.07 K/UL — SIGNIFICANT CHANGE UP (ref 0–0.7)
EOSINOPHIL NFR BLD AUTO: 0.9 % — SIGNIFICANT CHANGE UP (ref 0–8)
EOSINOPHIL NFR BLD AUTO: 0.9 % — SIGNIFICANT CHANGE UP (ref 0–8)
GLUCOSE BLDC GLUCOMTR-MCNC: 111 MG/DL — HIGH (ref 70–99)
GLUCOSE BLDC GLUCOMTR-MCNC: 111 MG/DL — HIGH (ref 70–99)
GLUCOSE SERPL-MCNC: 106 MG/DL — HIGH (ref 70–99)
GLUCOSE SERPL-MCNC: 106 MG/DL — HIGH (ref 70–99)
GLUCOSE SERPL-MCNC: 91 MG/DL — SIGNIFICANT CHANGE UP (ref 70–99)
GLUCOSE SERPL-MCNC: 91 MG/DL — SIGNIFICANT CHANGE UP (ref 70–99)
HCT VFR BLD CALC: 24.3 % — LOW (ref 37–47)
HCT VFR BLD CALC: 24.3 % — LOW (ref 37–47)
HCT VFR BLD CALC: 25 % — LOW (ref 37–47)
HCT VFR BLD CALC: 25 % — LOW (ref 37–47)
HGB BLD-MCNC: 8.1 G/DL — LOW (ref 12–16)
HGB BLD-MCNC: 8.1 G/DL — LOW (ref 12–16)
HGB BLD-MCNC: 8.4 G/DL — LOW (ref 12–16)
HGB BLD-MCNC: 8.4 G/DL — LOW (ref 12–16)
IMM GRANULOCYTES NFR BLD AUTO: 0.5 % — HIGH (ref 0.1–0.3)
IMM GRANULOCYTES NFR BLD AUTO: 0.5 % — HIGH (ref 0.1–0.3)
LYMPHOCYTES # BLD AUTO: 0.95 K/UL — LOW (ref 1.2–3.4)
LYMPHOCYTES # BLD AUTO: 0.95 K/UL — LOW (ref 1.2–3.4)
LYMPHOCYTES # BLD AUTO: 12.1 % — LOW (ref 20.5–51.1)
LYMPHOCYTES # BLD AUTO: 12.1 % — LOW (ref 20.5–51.1)
MAGNESIUM SERPL-MCNC: 1.8 MG/DL — SIGNIFICANT CHANGE UP (ref 1.8–2.4)
MAGNESIUM SERPL-MCNC: 1.8 MG/DL — SIGNIFICANT CHANGE UP (ref 1.8–2.4)
MAGNESIUM SERPL-MCNC: 2 MG/DL — SIGNIFICANT CHANGE UP (ref 1.8–2.4)
MAGNESIUM SERPL-MCNC: 2 MG/DL — SIGNIFICANT CHANGE UP (ref 1.8–2.4)
MCHC RBC-ENTMCNC: 33.3 G/DL — SIGNIFICANT CHANGE UP (ref 32–37)
MCHC RBC-ENTMCNC: 33.3 G/DL — SIGNIFICANT CHANGE UP (ref 32–37)
MCHC RBC-ENTMCNC: 33.6 G/DL — SIGNIFICANT CHANGE UP (ref 32–37)
MCHC RBC-ENTMCNC: 33.6 G/DL — SIGNIFICANT CHANGE UP (ref 32–37)
MCHC RBC-ENTMCNC: 33.6 PG — HIGH (ref 27–31)
MCHC RBC-ENTMCNC: 33.6 PG — HIGH (ref 27–31)
MCHC RBC-ENTMCNC: 33.9 PG — HIGH (ref 27–31)
MCHC RBC-ENTMCNC: 33.9 PG — HIGH (ref 27–31)
MCV RBC AUTO: 100.8 FL — HIGH (ref 81–99)
MONOCYTES # BLD AUTO: 0.78 K/UL — HIGH (ref 0.1–0.6)
MONOCYTES # BLD AUTO: 0.78 K/UL — HIGH (ref 0.1–0.6)
MONOCYTES NFR BLD AUTO: 9.9 % — HIGH (ref 1.7–9.3)
MONOCYTES NFR BLD AUTO: 9.9 % — HIGH (ref 1.7–9.3)
NEUTROPHILS # BLD AUTO: 5.97 K/UL — SIGNIFICANT CHANGE UP (ref 1.4–6.5)
NEUTROPHILS # BLD AUTO: 5.97 K/UL — SIGNIFICANT CHANGE UP (ref 1.4–6.5)
NEUTROPHILS NFR BLD AUTO: 76.2 % — HIGH (ref 42.2–75.2)
NEUTROPHILS NFR BLD AUTO: 76.2 % — HIGH (ref 42.2–75.2)
NRBC # BLD: 0 /100 WBCS — SIGNIFICANT CHANGE UP (ref 0–0)
PHOSPHATE SERPL-MCNC: 2.8 MG/DL — SIGNIFICANT CHANGE UP (ref 2.1–4.9)
PHOSPHATE SERPL-MCNC: 2.8 MG/DL — SIGNIFICANT CHANGE UP (ref 2.1–4.9)
PHOSPHATE SERPL-MCNC: 3.2 MG/DL — SIGNIFICANT CHANGE UP (ref 2.1–4.9)
PHOSPHATE SERPL-MCNC: 3.2 MG/DL — SIGNIFICANT CHANGE UP (ref 2.1–4.9)
PLATELET # BLD AUTO: 251 K/UL — SIGNIFICANT CHANGE UP (ref 130–400)
PLATELET # BLD AUTO: 251 K/UL — SIGNIFICANT CHANGE UP (ref 130–400)
PLATELET # BLD AUTO: 273 K/UL — SIGNIFICANT CHANGE UP (ref 130–400)
PLATELET # BLD AUTO: 273 K/UL — SIGNIFICANT CHANGE UP (ref 130–400)
PMV BLD: 10.2 FL — SIGNIFICANT CHANGE UP (ref 7.4–10.4)
PMV BLD: 10.2 FL — SIGNIFICANT CHANGE UP (ref 7.4–10.4)
PMV BLD: 9.9 FL — SIGNIFICANT CHANGE UP (ref 7.4–10.4)
PMV BLD: 9.9 FL — SIGNIFICANT CHANGE UP (ref 7.4–10.4)
POTASSIUM SERPL-MCNC: 4.4 MMOL/L — SIGNIFICANT CHANGE UP (ref 3.5–5)
POTASSIUM SERPL-MCNC: 4.4 MMOL/L — SIGNIFICANT CHANGE UP (ref 3.5–5)
POTASSIUM SERPL-MCNC: 4.5 MMOL/L — SIGNIFICANT CHANGE UP (ref 3.5–5)
POTASSIUM SERPL-MCNC: 4.5 MMOL/L — SIGNIFICANT CHANGE UP (ref 3.5–5)
POTASSIUM SERPL-SCNC: 4.4 MMOL/L — SIGNIFICANT CHANGE UP (ref 3.5–5)
POTASSIUM SERPL-SCNC: 4.4 MMOL/L — SIGNIFICANT CHANGE UP (ref 3.5–5)
POTASSIUM SERPL-SCNC: 4.5 MMOL/L — SIGNIFICANT CHANGE UP (ref 3.5–5)
POTASSIUM SERPL-SCNC: 4.5 MMOL/L — SIGNIFICANT CHANGE UP (ref 3.5–5)
RBC # BLD: 2.41 M/UL — LOW (ref 4.2–5.4)
RBC # BLD: 2.41 M/UL — LOW (ref 4.2–5.4)
RBC # BLD: 2.48 M/UL — LOW (ref 4.2–5.4)
RBC # BLD: 2.48 M/UL — LOW (ref 4.2–5.4)
RBC # FLD: 16 % — HIGH (ref 11.5–14.5)
RBC # FLD: 16 % — HIGH (ref 11.5–14.5)
RBC # FLD: 16.3 % — HIGH (ref 11.5–14.5)
RBC # FLD: 16.3 % — HIGH (ref 11.5–14.5)
SODIUM SERPL-SCNC: 134 MMOL/L — LOW (ref 135–146)
SODIUM SERPL-SCNC: 134 MMOL/L — LOW (ref 135–146)
SODIUM SERPL-SCNC: 139 MMOL/L — SIGNIFICANT CHANGE UP (ref 135–146)
SODIUM SERPL-SCNC: 139 MMOL/L — SIGNIFICANT CHANGE UP (ref 135–146)
WBC # BLD: 6.19 K/UL — SIGNIFICANT CHANGE UP (ref 4.8–10.8)
WBC # BLD: 6.19 K/UL — SIGNIFICANT CHANGE UP (ref 4.8–10.8)
WBC # BLD: 7.84 K/UL — SIGNIFICANT CHANGE UP (ref 4.8–10.8)
WBC # BLD: 7.84 K/UL — SIGNIFICANT CHANGE UP (ref 4.8–10.8)
WBC # FLD AUTO: 6.19 K/UL — SIGNIFICANT CHANGE UP (ref 4.8–10.8)
WBC # FLD AUTO: 6.19 K/UL — SIGNIFICANT CHANGE UP (ref 4.8–10.8)
WBC # FLD AUTO: 7.84 K/UL — SIGNIFICANT CHANGE UP (ref 4.8–10.8)
WBC # FLD AUTO: 7.84 K/UL — SIGNIFICANT CHANGE UP (ref 4.8–10.8)

## 2023-12-02 PROCEDURE — 99232 SBSQ HOSP IP/OBS MODERATE 35: CPT

## 2023-12-02 RX ADMIN — Medication 650 MILLIGRAM(S): at 18:55

## 2023-12-02 RX ADMIN — ENOXAPARIN SODIUM 45 MILLIGRAM(S): 100 INJECTION SUBCUTANEOUS at 08:56

## 2023-12-02 RX ADMIN — Medication 650 MILLIGRAM(S): at 06:11

## 2023-12-02 RX ADMIN — Medication 650 MILLIGRAM(S): at 11:40

## 2023-12-02 RX ADMIN — SENNA PLUS 2 TABLET(S): 8.6 TABLET ORAL at 21:17

## 2023-12-02 RX ADMIN — Medication 650 MILLIGRAM(S): at 12:32

## 2023-12-02 RX ADMIN — POLYETHYLENE GLYCOL 3350 17 GRAM(S): 17 POWDER, FOR SOLUTION ORAL at 11:40

## 2023-12-02 RX ADMIN — ATORVASTATIN CALCIUM 10 MILLIGRAM(S): 80 TABLET, FILM COATED ORAL at 21:17

## 2023-12-02 RX ADMIN — Medication 10 MILLIGRAM(S): at 11:41

## 2023-12-02 RX ADMIN — Medication 25 MILLIGRAM(S): at 18:28

## 2023-12-02 RX ADMIN — CHLORHEXIDINE GLUCONATE 1 APPLICATION(S): 213 SOLUTION TOPICAL at 11:41

## 2023-12-02 RX ADMIN — OXYCODONE HYDROCHLORIDE 5 MILLIGRAM(S): 5 TABLET ORAL at 12:32

## 2023-12-02 RX ADMIN — Medication 50 MICROGRAM(S): at 06:10

## 2023-12-02 RX ADMIN — Medication 25 MILLIGRAM(S): at 06:10

## 2023-12-02 RX ADMIN — OXYCODONE HYDROCHLORIDE 5 MILLIGRAM(S): 5 TABLET ORAL at 12:29

## 2023-12-02 RX ADMIN — ENOXAPARIN SODIUM 45 MILLIGRAM(S): 100 INJECTION SUBCUTANEOUS at 18:28

## 2023-12-02 RX ADMIN — Medication 650 MILLIGRAM(S): at 18:28

## 2023-12-02 NOTE — PROGRESS NOTE ADULT - ASSESSMENT
73y Female PMHx of Afib (on Xarelto), h/o DVT/PE, s/p IVC filter, HTN, hypothyroidism, COPD/emphysema, ex-smoker (quit >16 years ago) who s/p mechanical fall sustained a right acetabular fracture and retroperitoneal hematoma     #Rt acetabular fracture  - pain per primary team - oxy IR 5mg q6hrs for severe pain, oxy IR 2.5mg q4h prn for mod pain   - Ortho and pt discussed risks and benefits of possible acetabular fixation, pt has opted for conservative management with no surgical intervention   - TTWB to the right leg - weight bearing status     #COPD/emphysema  -on 2L NC but not in exacerbation, not wheezing  -c/w symbicort, spiriva   -would add Duoneb q4h standing and prn  -add albuterol q6h prn      #Iliopsoas hematoma/retroperitoneal hematoma  -serial h/h   -transfuse <7  -holding home xarelto; pt now on therapeutic lovenox  - recommend bid h/h trending, noted drifting down     #Afib  -currently rate controlled  -continue metoprolol  -cards risk stratification for OR appreciated     #HTN  -continue home lisinopril and metoprolol  -Goal <140/90    #Constipation  -no abd pain  -c/w senna  -can increase miralax to bid  -getting dulcolax suppository     Updated patient's  at bedside.    Thank you for the courtesy of this consult. Please reach out to me at [phone] with any questions.

## 2023-12-02 NOTE — PROGRESS NOTE ADULT - SUBJECTIVE AND OBJECTIVE BOX
MAURILIO HENSON   [id number]   [birth year]  73yF    Admit Date: 11-30-23  Indication for SICU: hypotension  OR #1-n/a        ============================  HPI   HPI: 73F w/ PMHx of Afib (on Xarelto), h/o DVT/PE, s/p IVC filter, HTN, hypothyroidism, COPD/emphysema, ex-smoker (quit >16 years ago) who presents as a TRAUMA ALERT (transfer from [facility]) s/p mechanical fall (-HT -LOC +AC  Imaging:  CTA pelvis: No evidence of active arterial hemorrhage. Redemonstration of an acute comminuted displaced right acetabular fracture with adjacent iliopsoas/retroperitoneal hematoma extending superiorly anterior to the right iliac bone.  CT chest: Negative for acute pathology. Patchy areas of subsegmental consolidation right lower lobe and peripheral right middle lobe   CT abdomen: Negative for acute pathology.  CT head: Negative for acute pathology.  CT cervical spine: Negative for acute pathology. Degenerative disc changes.   R Elbow xray: negative  R knee xyay: negative     24 Hour Events  12/1   NIght  - AAox3, in NAD, palpable DP, PT b/l  - d/g, pending bed  - BM:     12/1  DAY  -Started Therapeutic Lovenox while Xarelto on hold. Cleared with orttho   -added miralax   -ortho: high risk proceudre. pt and familuy still deciding. c/w DVT pphx, OOBTC TTWB RLE      [X] A ten-point review of systems was otherwise negative except as noted above.  [  ] Due to altered mental status/intubation, subjective information was not attained from the patient. History was obtained, to the extent possible, from review of the chart and collateral sources of information.    =========================================================================================================================================   MAURILIO HENSON   [id number]   [birth year]  73yF    Admit Date: 11-30-23  Indication for SICU: hypotension  OR #1-n/a        ============================  HPI   HPI: 73F w/ PMHx of Afib (on Xarelto), h/o DVT/PE, s/p IVC filter, HTN, hypothyroidism, COPD/emphysema, ex-smoker (quit >16 years ago) who presents as a TRAUMA ALERT (transfer from [facility]) s/p mechanical fall (-HT -LOC +AC  Imaging:  CTA pelvis: No evidence of active arterial hemorrhage. Redemonstration of an acute comminuted displaced right acetabular fracture with adjacent iliopsoas/retroperitoneal hematoma extending superiorly anterior to the right iliac bone.  CT chest: Negative for acute pathology. Patchy areas of subsegmental consolidation right lower lobe and peripheral right middle lobe   CT abdomen: Negative for acute pathology.  CT head: Negative for acute pathology.  CT cervical spine: Negative for acute pathology. Degenerative disc changes.   R Elbow xray: negative  R knee xyay: negative     24 Hour Events  12/1   NIght  - AAox3, in NAD, palpable DP, PT b/l  - d/g, pending bed  - BM:     12/1  DAY  -Started Therapeutic Lovenox while Xarelto on hold. Cleared with orttho   -added miralax   -ortho: high risk proceudre. pt and familuy still deciding. c/w DVT pphx, OOBTC TTWB RLE      [X] A ten-point review of systems was otherwise negative except as noted above.  [  ] Due to altered mental status/intubation, subjective information was not attained from the patient. History was obtained, to the extent possible, from review of the chart and collateral sources of information.    =========================================================================================================================================    Daily     Daily     Diet, DASH/TLC:   Sodium & Cholesterol Restricted (11-30-23 @ 14:06)      CURRENT MEDS:  Neurologic Medications  acetaminophen     Tablet .. 650 milliGRAM(s) Oral every 6 hours  oxyCODONE    IR 2.5 milliGRAM(s) Oral every 4 hours PRN Moderate Pain (4 - 6)  oxyCODONE    IR 5 milliGRAM(s) Oral every 6 hours PRN Severe Pain (7 - 10)    Respiratory Medications  budesonide 160 MICROgram(s)/formoterol 4.5 MICROgram(s) Inhaler 2 Puff(s) Inhalation two times a day  tiotropium 2.5 MICROgram(s) Inhaler 2 Puff(s) Inhalation daily    Cardiovascular Medications  metoprolol tartrate 25 milliGRAM(s) Oral every 12 hours    Gastrointestinal Medications  polyethylene glycol 3350 17 Gram(s) Oral daily  senna 2 Tablet(s) Oral at bedtime    Genitourinary Medications    Hematologic/Oncologic Medications  enoxaparin Injectable 45 milliGRAM(s) SubCutaneous every 12 hours    Antimicrobial/Immunologic Medications    Endocrine/Metabolic Medications  atorvastatin 10 milliGRAM(s) Oral at bedtime  levothyroxine 50 MICROGram(s) Oral daily  levothyroxine 75 MICROGram(s) Oral daily    Topical/Other Medications  chlorhexidine 2% Cloths 1 Application(s) Topical daily      ICU Vital Signs Last 24 Hrs  T(C): 36.7 (01 Dec 2023 20:00), Max: 36.7 (01 Dec 2023 20:00)  T(F): 98 (01 Dec 2023 20:00), Max: 98 (01 Dec 2023 20:00)  HR: 66 (02 Dec 2023 04:00) (64 - 90)  BP: 152/70 (02 Dec 2023 04:00) (115/57 - 158/69)  BP(mean): 100 (02 Dec 2023 04:00) (80 - 109)  ABP: --  ABP(mean): --  RR: 47 (02 Dec 2023 04:00) (16 - 47)  SpO2: 100% (02 Dec 2023 04:00) (92% - 100%)    O2 Parameters below as of 02 Dec 2023 04:00  Patient On (Oxygen Delivery Method): nasal cannula  O2 Flow (L/min): 2          Adult Advanced Hemodynamics Last 24 Hrs  CVP(mm Hg): --  CVP(cm H2O): --  CO: --  CI: --  PA: --  PA(mean): --  PCWP: --  SVR: --  SVRI: --  PVR: --  PVRI: --          I&O's Summary    01 Dec 2023 07:01  -  02 Dec 2023 07:00  --------------------------------------------------------  IN: 0 mL / OUT: 200 mL / NET: -200 mL      I&O's Detail    01 Dec 2023 07:01  -  02 Dec 2023 07:00  --------------------------------------------------------  IN:  Total IN: 0 mL    OUT:    Voided (mL): 200 mL  Total OUT: 200 mL    Total NET: -200 mL          PHYSICAL EXAM:    General/Neuro  GCS:  15   = E 4  / V 5  / M 6     Deficits:   alert & oriented x 3, no focal deficits, moving bilateral upper and lower extremities with sensation intact bilaterally    Lungs:      clear to auscultation bilaterally w/ lung sounds present bilaterally, Normal expansion/effort. SpO2 93% on 2L NC.    Cardiovascular : S1, S2.  Regular rate and rhythm.    Cardiac Rhythm: Normal Sinus Rhythm    GI: Abdomen soft, Non-tender, Non-distended.      Extremities: Extremities warm and dry. Palpable DP and PT pulses bilaterally. Right hip soft with some tenderness to palpation.    Derm: Good skin turgor, no skin breakdown.          CXR:     LABS:  CAPILLARY BLOOD GLUCOSE                              8.4    6.19  )-----------( 251      ( 02 Dec 2023 04:49 )             25.0       12-02    139  |  105  |  11  ----------------------------<  91  4.4   |  24  |  0.6<L>    Ca    8.4      02 Dec 2023 04:49  Phos  3.2     12-02  Mg     2.0     12-02        PT/INR - ( 30 Nov 2023 22:12 )   PT: 13.20 sec;   INR: 1.16 ratio         PTT - ( 30 Nov 2023 08:23 )  PTT:37.8 sec      Urinalysis Basic - ( 02 Dec 2023 04:49 )    Color: x / Appearance: x / SG: x / pH: x  Gluc: 91 mg/dL / Ketone: x  / Bili: x / Urobili: x   Blood: x / Protein: x / Nitrite: x   Leuk Esterase: x / RBC: x / WBC x   Sq Epi: x / Non Sq Epi: x / Bacteria: x

## 2023-12-02 NOTE — PROGRESS NOTE ADULT - SUBJECTIVE AND OBJECTIVE BOX
MAURILIO HENSON  [facility]-N 2B 014 A (SI-N 2B)    ***My note supersedes ALL resident notes that I sign.  My corrections for their notes are in my note.***    Patient is a 73y old  Female who presents with a chief complaint of S/P mechanical fall (-HT, -LOC, +AC) (02 Dec 2023 03:40)        Interval events:   Patient seen and examined at bedside. No acute events overnight. Got out of bed today, in a lot of pain. She is on 2L NC, denies SOB, cough, or chest pain. No fevers. Hasn't had BM in last 3 days, denies abd pain.     -PMHx: Atrial fibrillation    Hypothyroid    HTN (hypertension)    Hyperlipidemia    History of blood clotting disorder      -PSHx:S/P IVC filter               REVIEW OF SYSTEMS:  CONSTITUTIONAL: No fever, weight loss, or fatigue  RESPIRATORY: No cough, wheezing, chills or hemoptysis; No shortness of breath  CARDIOVASCULAR: No chest pain, palpitations, dizziness, or leg swelling  GASTROINTESTINAL: No abdominal or epigastric pain. No nausea, vomiting, or hematemesis; No diarrhea or constipation. No melena or hematochezia.  NEUROLOGICAL: No headaches  LYMPH NODES: No enlarged glands  MUSCULOSKELETAL: as above       T(C): , Max: 36.8 (12-02-23 @ 12:00)  HR: 82 (12-02-23 @ 12:00)  BP: 112/62 (12-02-23 @ 12:00)  RR: 18 (12-02-23 @ 12:00)  SpO2: 97% (12-02-23 @ 12:00)  CAPILLARY BLOOD GLUCOSE           PHYSICAL EXAM:  General: NAD, A/O x 3  ENT: MMM  Neck: Supple, No JVD  Lungs: diminished bilaterally, no wheezing   Cardio: RRR, S1/S2, 2/6 systolic murmur   Abdomen: Soft, Nontender, Nondistended; Bowel sounds present  Extremities: No cyanosis, No edema      LABS:          8.4  6.19  )-------(251          25.0  N=--  L=--  SXI=224.8          8.5  7.07  )-------(256          26.2  N=--  L=--  YME=603.1    139|105|11  ------------------<91  4.4|24|0.6<L>  eGFR:--  Ca:8.4  137|103|14  ------------------<108<H>  4.0|23|0.6<L>  eGFR:--  Ca:8.4      PT/INR - ( 30 Nov 2023 22:12 )   PT: 13.20 sec;   INR: 1.16 ratio               Microbiology:      RADIOLOGY & ADDITIONAL TESTS:  < from: Xray Knee 1 or 2 Views, Right (11.30.23 @ 10:23) >  Findings/  impression:    No acute fracture or dislocation. Diffuse osteopenia. Moderate medial   compartment and mild lateral and patellofemoral compartment   osteoarthritis. Trace knee joint fluid. There are vascular calcifications.    < end of copied text >    < from: Xray Chest 1 View- PORTABLE-Urgent (Xray Chest 1 View- PORTABLE-Urgent .) (11.30.23 @ 10:22) >  Impression:    No radiographic evidence of acute cardiopulmonary disease.    Unchanged scattered calcified granulomas.    < end of copied text >        Medications:  acetaminophen     Tablet .. 650 milliGRAM(s) Oral every 6 hours  atorvastatin 10 milliGRAM(s) Oral at bedtime  bisacodyl Suppository 10 milliGRAM(s) Rectal daily  budesonide 160 MICROgram(s)/formoterol 4.5 MICROgram(s) Inhaler 2 Puff(s) Inhalation two times a day  chlorhexidine 2% Cloths 1 Application(s) Topical daily  enoxaparin Injectable 45 milliGRAM(s) SubCutaneous every 12 hours  levothyroxine 50 MICROGram(s) Oral daily  levothyroxine 75 MICROGram(s) Oral daily  metoprolol tartrate 25 milliGRAM(s) Oral every 12 hours  oxyCODONE    IR 2.5 milliGRAM(s) Oral every 4 hours PRN  oxyCODONE    IR 5 milliGRAM(s) Oral every 6 hours PRN  polyethylene glycol 3350 17 Gram(s) Oral daily  senna 2 Tablet(s) Oral at bedtime  tiotropium 2.5 MICROgram(s) Inhaler 2 Puff(s) Inhalation daily

## 2023-12-02 NOTE — CHART NOTE - NSCHARTNOTEFT_GEN_A_CORE
SICU Transfer Note:    Transfer from: SICU  Transfer to:  ( X ) Surgery    (  ) Telemetry    (  ) Medicine    (  ) Palliative    (  ) Stroke Unit    (  ) _______________      SICU COURSE:  HPI   73F w/ PMHx of Afib (on Xarelto), h/o DVT/PE, s/p IVC filter, HTN, hypothyroidism, COPD/emphysema, ex-smoker (quit >16 years ago) who presents as a TRAUMA ALERT (transfer from [facility]) s/p mechanical fall (-HT -LOC +AC)  Imaging:  CTA pelvis: No evidence of active arterial hemorrhage. Redemonstration of an acute comminuted displaced right acetabular fracture with adjacent iliopsoas/retroperitoneal hematoma extending superiorly anterior to the right iliac bone.  CT chest: Negative for acute pathology. Patchy areas of subsegmental consolidation right lower lobe and peripheral right middle lobe   CT abdomen: Negative for acute pathology.  CT head: Negative for acute pathology.  CT cervical spine: Negative for acute pathology. Degenerative disc changes.   R Elbow xray: negative  R knee xray: negative    Patient is opting for non-operative management of her right acetabular fracture.          PAST MEDICAL & SURGICAL HISTORY:  Atrial fibrillation      Hypothyroid      HTN (hypertension)      Hyperlipidemia      History of blood clotting disorder      S/P IVC filter        Allergies  No Known Allergies    Intolerances      MEDICATIONS  (STANDING):  acetaminophen     Tablet .. 650 milliGRAM(s) Oral every 6 hours  atorvastatin 10 milliGRAM(s) Oral at bedtime  bisacodyl Suppository 10 milliGRAM(s) Rectal daily  budesonide 160 MICROgram(s)/formoterol 4.5 MICROgram(s) Inhaler 2 Puff(s) Inhalation two times a day  chlorhexidine 2% Cloths 1 Application(s) Topical daily  enoxaparin Injectable 45 milliGRAM(s) SubCutaneous every 12 hours  levothyroxine 50 MICROGram(s) Oral daily  levothyroxine 75 MICROGram(s) Oral daily  metoprolol tartrate 25 milliGRAM(s) Oral every 12 hours  polyethylene glycol 3350 17 Gram(s) Oral daily  senna 2 Tablet(s) Oral at bedtime  tiotropium 2.5 MICROgram(s) Inhaler 2 Puff(s) Inhalation daily    MEDICATIONS  (PRN):  oxyCODONE    IR 2.5 milliGRAM(s) Oral every 4 hours PRN Moderate Pain (4 - 6)  oxyCODONE    IR 5 milliGRAM(s) Oral every 6 hours PRN Severe Pain (7 - 10)        Vital Signs Last 24 Hrs  T(C): 36.8 (02 Dec 2023 12:00), Max: 36.8 (02 Dec 2023 12:00)  T(F): 98.2 (02 Dec 2023 12:00), Max: 98.2 (02 Dec 2023 12:00)  HR: 82 (02 Dec 2023 12:00) (64 - 86)  BP: 112/62 (02 Dec 2023 12:00) (112/62 - 159/79)  BP(mean): 81 (02 Dec 2023 12:00) (81 - 112)  RR: 18 (02 Dec 2023 12:00) (18 - 47)  SpO2: 97% (02 Dec 2023 12:00) (93% - 100%)    Parameters below as of 02 Dec 2023 12:00  Patient On (Oxygen Delivery Method): nasal cannula  O2 Flow (L/min): 2    I&O's Summary    01 Dec 2023 07:01  -  02 Dec 2023 07:00  --------------------------------------------------------  IN: 0 mL / OUT: 200 mL / NET: -200 mL        LABS                                            8.4                   Neurophils% (auto):   x      (12-02 @ 04:49):    6.19 )-----------(251          Lymphocytes% (auto):  x                                             25.0                   Eosinphils% (auto):   x        Manual%: Neutrophils x    ; Lymphocytes x    ; Eosinophils x    ; Bands%: x    ; Blasts x                                    139    |  105    |  11                  Calcium: 8.4   / iCa: x      (12-02 @ 04:49)    ----------------------------<  91        Magnesium: 2.0                              4.4     |  24     |  0.6              Phosphorous: 3.2        Assessment & Plan  73y Female s/p mechanical fall, with acute R comminuted acetabular fracture.      NEURO:  #Acute pain  - tylenol ATC   - oxycodone 2.5 mg q4 prn moderate pain   - oxycodone 5 mg Q6 PRN severe pain        RESP:   #Oxygenation  - 2L  #Hx of emphysema (not on home O2)   -Continue spiriva   -Continue advair   #Activity    -TTWB RLE (Per orthopedic surgery team)  -pulm: high risk for surgery. Keep inhalers       CARDS:   #Hematoma iliopsoas/retroperitoneal hematoma   - trend CBC stable x3 s/p 1 unit PRBCs. Hbg 10.0    - Echo 11/30-EF 55 to 65%, cardio clearance: hold xarelto 48 hrs prior to procedure    -currently on therapeutic lovenox    #Hx Afib   -Continue metoprolol 25 q12   -Holding home Xarelto  (last dose 11/28/23)   -Rate controlled  #Hx HTN   - Lisinopril 10 q12 held   Rx-lisinopril 10 two times a day  metoprolol tartrate 50 daily      GI/NUTR:   #Diet DASH Regular       -aspiration precautions, HOB 30  #GI Prophylaxis    -not indicated  #Hx HLD    - Continue atorvastatin 10mg  #Bowel regimen    -senna    -miralax     -last bowel movement wednesday        - started dulcolax suppository    /RENAL:   #urine output in crtically ill  IVL     Labs:          BUN/Cr- 17/0.7  -->,  14/0.6  -->          Electrolytes-Na 137 // K 4.0 // Mg 1.9 //  Phos 3.0 (12-01 @ 20:31)  #maintain euvolemia             HEME/ONC:   #Hx DVT/PE  - s/p IVC filter placed 10 years ago  #DVT prophylaxsis: therapeutic lovenox 45 q12    Labs: Hb/Hct:  8.8/26.5  (12-01 @ 00:40)  -->,  8.5/26.2  (12-01 @ 20:31)  -->                      Plts:  271  -->,  256  -->           ENDO:  #Hx hypothyroidism   -Levothyroxine per patient schedule      -Glucose goal 140-180    -if above 180 start ISS    MSK:  #R comminuted acetabular fx   -Ortho following    -F/u surgical plan --> patient is electing for non-operative management    -cardio and pulm: high risk   - Activity - Weight Bearing Status:     Left Lower Extremity:  Non-Weight Bearing    Right Lower Extremity:  Non-Weight Bearing (11-30-23 @ 04:00)  Activity - Bedrest (11-30-23 @ 04:00)  R knee and R elbow xrays: no fracture     LINES/DRAINS:  PIV    ADVANCED DIRECTIVES:  Full Code    HCP/Emergency Contact-    INDICATION FOR SICU: Hypotension s/p mechanical fall      DISPO: Downgrade to floor. Discussed with Dr. May.        Patient signed out to __________            For Follow-Up: SICU Transfer Note:    Transfer from: SICU  Transfer to:  ( X ) Surgery    (  ) Telemetry    (  ) Medicine    (  ) Palliative    (  ) Stroke Unit    (  ) _______________      SICU COURSE:  HPI   73F w/ PMHx of Afib (on Xarelto), h/o DVT/PE, s/p IVC filter, HTN, hypothyroidism, COPD/emphysema, ex-smoker (quit >16 years ago) who presents as a TRAUMA ALERT (transfer from [facility]) s/p mechanical fall (-HT -LOC +AC)  Imaging:  CTA pelvis: No evidence of active arterial hemorrhage. Redemonstration of an acute comminuted displaced right acetabular fracture with adjacent iliopsoas/retroperitoneal hematoma extending superiorly anterior to the right iliac bone.  CT chest: Negative for acute pathology. Patchy areas of subsegmental consolidation right lower lobe and peripheral right middle lobe   CT abdomen: Negative for acute pathology.  CT head: Negative for acute pathology.  CT cervical spine: Negative for acute pathology. Degenerative disc changes.   R Elbow xray: negative  R knee xray: negative    Patient is opting for non-operative management of her right acetabular fracture.          PAST MEDICAL & SURGICAL HISTORY:  Atrial fibrillation      Hypothyroid      HTN (hypertension)      Hyperlipidemia      History of blood clotting disorder      S/P IVC filter        Allergies  No Known Allergies    Intolerances      MEDICATIONS  (STANDING):  acetaminophen     Tablet .. 650 milliGRAM(s) Oral every 6 hours  atorvastatin 10 milliGRAM(s) Oral at bedtime  bisacodyl Suppository 10 milliGRAM(s) Rectal daily  budesonide 160 MICROgram(s)/formoterol 4.5 MICROgram(s) Inhaler 2 Puff(s) Inhalation two times a day  chlorhexidine 2% Cloths 1 Application(s) Topical daily  enoxaparin Injectable 45 milliGRAM(s) SubCutaneous every 12 hours  levothyroxine 50 MICROGram(s) Oral daily  levothyroxine 75 MICROGram(s) Oral daily  metoprolol tartrate 25 milliGRAM(s) Oral every 12 hours  polyethylene glycol 3350 17 Gram(s) Oral daily  senna 2 Tablet(s) Oral at bedtime  tiotropium 2.5 MICROgram(s) Inhaler 2 Puff(s) Inhalation daily    MEDICATIONS  (PRN):  oxyCODONE    IR 2.5 milliGRAM(s) Oral every 4 hours PRN Moderate Pain (4 - 6)  oxyCODONE    IR 5 milliGRAM(s) Oral every 6 hours PRN Severe Pain (7 - 10)        Vital Signs Last 24 Hrs  T(C): 36.8 (02 Dec 2023 12:00), Max: 36.8 (02 Dec 2023 12:00)  T(F): 98.2 (02 Dec 2023 12:00), Max: 98.2 (02 Dec 2023 12:00)  HR: 82 (02 Dec 2023 12:00) (64 - 86)  BP: 112/62 (02 Dec 2023 12:00) (112/62 - 159/79)  BP(mean): 81 (02 Dec 2023 12:00) (81 - 112)  RR: 18 (02 Dec 2023 12:00) (18 - 47)  SpO2: 97% (02 Dec 2023 12:00) (93% - 100%)    Parameters below as of 02 Dec 2023 12:00  Patient On (Oxygen Delivery Method): nasal cannula  O2 Flow (L/min): 2    I&O's Summary    01 Dec 2023 07:01  -  02 Dec 2023 07:00  --------------------------------------------------------  IN: 0 mL / OUT: 200 mL / NET: -200 mL        LABS                                            8.4                   Neurophils% (auto):   x      (12-02 @ 04:49):    6.19 )-----------(251          Lymphocytes% (auto):  x                                             25.0                   Eosinphils% (auto):   x        Manual%: Neutrophils x    ; Lymphocytes x    ; Eosinophils x    ; Bands%: x    ; Blasts x                                    139    |  105    |  11                  Calcium: 8.4   / iCa: x      (12-02 @ 04:49)    ----------------------------<  91        Magnesium: 2.0                              4.4     |  24     |  0.6              Phosphorous: 3.2        Assessment & Plan  73y Female s/p mechanical fall, with acute R comminuted acetabular fracture.      NEURO:  #Acute pain  - tylenol ATC   - oxycodone 2.5 mg q4 prn moderate pain   - oxycodone 5 mg Q6 PRN severe pain        RESP:   #Oxygenation  - 2L  #Hx of emphysema (not on home O2)   -Continue spiriva   -Continue advair   #Activity    -TTWB RLE (Per orthopedic surgery team)  -pulm: high risk for surgery. Keep inhalers       CARDS:   #Hematoma iliopsoas/retroperitoneal hematoma   - trend CBC stable x3 s/p 1 unit PRBCs. Hbg 10.0    - Echo 11/30-EF 55 to 65%, cardio clearance: hold xarelto 48 hrs prior to procedure    -currently on therapeutic lovenox    #Hx Afib   -Continue metoprolol 25 q12   -Holding home Xarelto  (last dose 11/28/23)   -Rate controlled  #Hx HTN   - Lisinopril 10 q12 held   Rx-lisinopril 10 two times a day  metoprolol tartrate 50 daily      GI/NUTR:   #Diet DASH Regular       -aspiration precautions, HOB 30  #GI Prophylaxis    -not indicated  #Hx HLD    - Continue atorvastatin 10mg  #Bowel regimen    -senna    -miralax     -last bowel movement wednesday        - started dulcolax suppository    /RENAL:   #urine output in crtically ill  IVL     Labs:          BUN/Cr- 17/0.7  -->,  14/0.6  -->          Electrolytes-Na 137 // K 4.0 // Mg 1.9 //  Phos 3.0 (12-01 @ 20:31)  #maintain euvolemia             HEME/ONC:   #Hx DVT/PE  - s/p IVC filter placed 10 years ago  #DVT prophylaxsis: therapeutic lovenox 45 q12    Labs: Hb/Hct:  8.8/26.5  (12-01 @ 00:40)  -->,  8.5/26.2  (12-01 @ 20:31)  -->                      Plts:  271  -->,  256  -->           ENDO:  #Hx hypothyroidism   -Levothyroxine per patient schedule      -Glucose goal 140-180    -if above 180 start ISS    MSK:  #R comminuted acetabular fx   -Ortho following    -F/u surgical plan --> patient is electing for non-operative management    -cardio and pulm: high risk   - Activity - Weight Bearing Status:     Left Lower Extremity:  Non-Weight Bearing    Right Lower Extremity:  Non-Weight Bearing (11-30-23 @ 04:00)  Activity - Bedrest (11-30-23 @ 04:00)  R knee and R elbow xrays: no fracture     LINES/DRAINS:  PIV    ADVANCED DIRECTIVES:  Full Code    HCP/Emergency Contact-    INDICATION FOR SICU: Hypotension s/p mechanical fall      DISPO: Downgrade to floor. Discussed with Dr. May.        Patient signed out to eBrny from trauma team on 12/2/23 at 13:29.            For Follow-Up:  - 8PM labs

## 2023-12-02 NOTE — PROGRESS NOTE ADULT - ASSESSMENT
73y Female s/p mechanical fall, with acute R comminuted acetabular fracture.      PLAN:  - Monitor vitals  - Monitor Labs and replete as necessary  - Monitor for bowel function  - Continue Pain Medications if necessary  - Continue Antibiotics if necessary  - Encourage ambulation as tolerated  - Monitor urine output and trial of void once Best removed  - DVT and GI Prophylaxis if indicated  - Monitor wound and dressing for changes, redress as needed.  - O2 as needed, Hx of emphysema (not on home O2); Continue spiriva, Continue advair. Pulm: high risk for surgery. Keep inhalers.  - Encourage IS, Aspiration precautions  - F/u activity recs per ortho  - Hematoma iliopsoas/retroperitoneal hematoma, monitor H&H, PRBCs PRN  - Echo pending, cardio clearance: hold xarelto 48 hrs prior to procedure  - Hx Afib, Continue metoprolol 25 q12, Holding home Xarelto  (last dose 11/28/23), rate controlled  - BP control  - DASH diet  - Hx HLD: Continue atorvastatin 10mg  - Bowel regimen  - Levothyroxine per patient schedule  - Glucose goal 140-180, if above 180 start ISS    MSK:  - R comminuted acetabular fx  -cardio and pulm: high risk   -Ortho following:  high risk, pt and family will decide. ok with dvt pphx and RLE TTWB PT    - Activity - Weight Bearing Status:     Left Lower Extremity:  Non-Weight Bearing    - Bedrest  R knee and R elbow xrays: no fracture       DISPO: Surgical floor > 4C     Attending agrees with above plan unless otherwise stated. Pending attending attestation.

## 2023-12-02 NOTE — PHYSICAL THERAPY INITIAL EVALUATION ADULT - DIAGNOSIS, PT EVAL
Gait dysfunction secondary to Iliopsoas muscle hematoma; Hypotension; Hemorrhage requiring transfusion; s/p mechanical fall; Right acetabular fracture

## 2023-12-02 NOTE — PROGRESS NOTE ADULT - ASSESSMENT
Assessment & Plan  73y Female s/p mechanical fall, with acute R comminuted acetabular fracture.      NEURO:  #Acute pain  - tylenol standing   - oxycodone 2.5 mg q4 prn moderate pain   - oxycodone 5 mg Q6 PRN severe pain        RESP:   #Oxygenation    RA   #Hx of emphysema (not on home O2)   -Continue spiriva   -Continue advair   #Activity    -Bedrest per ortho  -pulm: high risk for surgery. Keep inhalers       CARDS:   #Hematoma iliopsoas/retroperitoneal hematoma   - trend CBC stable x3 s/p 1 unit PRBCs. Hbg 10.0    - Echo 11/30-EF 55 to 65%, cardio clearance: hold xarelto 48 hrs prior to procedure    -currently on therapeutic lovenox    #Hx Afib   -Continue metoprolol 25 q12   -Holding home Xarelto  (last dose 11/28/23)   -Rate controlled  #Hx HTN   - Lisinopril 10 q12 held   Rx-lisinopril 10 two times a day  metoprolol tartrate 50 daily      GI/NUTR:   #Diet DASH Full       -aspiration precautions, HOB 30  #GI Prophylaxis    -not indicated  #Hx HLD    - Continue atorvastatin 10mg  #Bowel regimen    -senna    -miralax     -last bowel movement wednesday        /RENAL:   #urine output in crtically ill  IVL     Labs:          BUN/Cr- 17/0.7  -->,  14/0.6  -->          Electrolytes-Na 137 // K 4.0 // Mg 1.9 //  Phos 3.0 (12-01 @ 20:31)  #maintain euvolemia             HEME/ONC:   #Hx DVT/PE  - s/p IVC filter placed 10 years ago  #DVT prophylaxsis: therepauetic lovenox     Labs: Hb/Hct:  8.8/26.5  (12-01 @ 00:40)  -->,  8.5/26.2  (12-01 @ 20:31)  -->                      Plts:  271  -->,  256  -->             ENDO:  #Hx hypothyroidism   -Levothyroxine per patient schedule      -Glucose goal 140-180    -if above 180 start ISS    MSK:  #R comminuted acetabular fx   -Ortho following    -F/u surgical plan   -cardio and pulm: high risk   - Activity - Weight Bearing Status:     Left Lower Extremity:  Non-Weight Bearing    Right Lower Extremity:  Non-Weight Bearing (11-30-23 @ 04:00)  Activity - Bedrest (11-30-23 @ 04:00)  R knee and R elbow xrays: no fracture     LINES/DRAINS:  PIV    ADVANCED DIRECTIVES:  Full Code    HCP/Emergency Contact-    INDICATION FOR SICU: Hypotension s/p mechanical fall      DISPO: Surgical floor   Assessment & Plan  73y Female s/p mechanical fall, with acute R comminuted acetabular fracture.      NEURO:  #Acute pain  - tylenol standing   - oxycodone 2.5 mg q4 prn moderate pain   - oxycodone 5 mg Q6 PRN severe pain        RESP:   #Oxygenation    RA   #Hx of emphysema (not on home O2)   -Continue spiriva   -Continue advair   #Activity    -Bedrest per ortho  -pulm: high risk for surgery. Keep inhalers       CARDS:   #Hematoma iliopsoas/retroperitoneal hematoma   - trend CBC stable x3 s/p 1 unit PRBCs. Hbg 10.0    - Echo 11/30-EF 55 to 65%, cardio clearance: hold xarelto 48 hrs prior to procedure    -currently on therapeutic lovenox    #Hx Afib   -Continue metoprolol 25 q12   -Holding home Xarelto  (last dose 11/28/23)   -Rate controlled  #Hx HTN   - Lisinopril 10 q12 held   Rx-lisinopril 10 two times a day  metoprolol tartrate 50 daily      GI/NUTR:   #Diet DASH Full       -aspiration precautions, HOB 30  #GI Prophylaxis    -not indicated  #Hx HLD    - Continue atorvastatin 10mg  #Bowel regimen    -senna    -miralax     -last bowel movement wednesday        /RENAL:   #urine output in crtically ill  IVL     Labs:          BUN/Cr- 17/0.7  -->,  14/0.6  -->          Electrolytes-Na 137 // K 4.0 // Mg 1.9 //  Phos 3.0 (12-01 @ 20:31)  #maintain euvolemia             HEME/ONC:   #Hx DVT/PE  - s/p IVC filter placed 10 years ago  #DVT prophylaxsis: therepauetic lovenox     Labs: Hb/Hct:  8.8/26.5  (12-01 @ 00:40)  -->,  8.5/26.2  (12-01 @ 20:31)  -->                      Plts:  271  -->,  256  -->             ENDO:  #Hx hypothyroidism   -Levothyroxine per patient schedule      -Glucose goal 140-180    -if above 180 start ISS    MSK:  #R comminuted acetabular fx   -Ortho following    -Surgical plan --> patient is electing to proceed with non-operative management   -cardio and pulm: high risk   - Activity - Weight Bearing Status:     Left Lower Extremity:  Non-Weight Bearing    Right Lower Extremity:  Non-Weight Bearing (11-30-23 @ 04:00)  Activity - Bedrest (11-30-23 @ 04:00)  R knee and R elbow xrays: no fracture     LINES/DRAINS:  PIV    ADVANCED DIRECTIVES:  Full Code    HCP/Emergency Contact-    INDICATION FOR SICU: Hypotension s/p mechanical fall      DISPO: Surgical floor

## 2023-12-02 NOTE — PHYSICAL THERAPY INITIAL EVALUATION ADULT - PERTINENT HX OF CURRENT PROBLEM, REHAB EVAL
73F w/ PMHx of Afib (on Xarelto), h/o DVT/PE, s/p IVC filter, HTN, hypothyroidism, COPD/emphysema, ex-smoker (quit >16 years ago) who presents as a TRAUMA ALERT (transfer from [facility]) s/p mechanical fall (-HT -LOC +AC). ABCs intact, GCS 15. +Right Acetabular Fracture, Right Iliopsoas Hematoma, Acute blood loss anemia.

## 2023-12-02 NOTE — PROGRESS NOTE ADULT - ATTENDING COMMENTS
This patient has a high probability of sudden, clinically significant deterioration, which requires the highest level of physician preparedness to intervene urgently. I managed/supervised life or organ supporting interventions that required frequent physician assessment. I devoted my full attention in the ICU to the direct care of this patient for the period of time indicated below. Time I spent with the family or surrogate(s) is included only if the patient was incapable of providing the necessary information or participating in medical decision making. Time devoted to teaching and to any procedures I billed separately is not included.     73y Female PMHx of Afib (on Xarelto), h/o DVT/PE, s/p IVC filter, HTN, hypothyroidism, COPD/emphysema, ex-smoker (quit >16 years ago) who s/p mechanical fall (-HT -LOC +AC) admitted to SICU. Received K[facility] and one unit pRBC at [facility] before transferred to [place] for trauma evaluation.  Injuries: R acetabular fracture, retroperitoneal hematoma, old left pubic rami fx    Patient is examined and evaluated at the bedside with SICU team. Treatment plan discussed with SICU team, nurses and primary team.   Chest X-ray and all relevant studies reviewed during rounds.  Will continue hemodynamic monitoring as per protocol in SICU.    24h events: made TTWB to RLE, no acut eevents overnight    Neuro:  alert, oriented, neurovascularly intact distally  Medications -  oxy 2.5/5 sliding scale, standing tylenol     Respiratory: SaO2 98% on 2L NC, 1750 on IS  Medications - spiriva, symbicort      CV: HR 67 /79   Medications - metoprolol 25mg BID, atorvastatin, therapeutic lovenox for hx of DVT/PE  Home medications - metoprolol XL 50mg, lisinopril 10mg, atorvastatin 10mg, xarelto  Studies - LV EF 55-60%     GI: abd s/nt/nd  Last BM - 11/29  Nutrition - DASH diet  Medications - senna, miralax, add suppository dulcolax, will try MoM if dulcolax does not stimulate BM  Ppx - not indicated      Renal: Continue I&Os monitoring, replete electrolytes as needed  12-02    139  |  105  |  11  ----------------------------<  91  4.4   |  24  |  0.6<L>    Ca    8.4      02 Dec 2023 04:49  Phos  3.2     12-02  Mg     2.0     12-02      UOP - 500 cc/24h,   I/O - 350/500     Heme: continue to evaluate for acute blood loss anemia- trend Hg/Hct                         8.4    6.19  )-----------( 251      ( 02 Dec 2023 04:49 )             25.0     Anticoagulation - therapeutic lovenox BID    ID: trend WBC, afebrile  Antibiotics - not indicated    Endocrine: Prevent and treat hyperglycemia with insulin as needed, synthroid for hypothyroidism    PV: follow pulse exam    MSK: TTWB RLE with PT, needs PT/OT eval    Skin: decub precautions    Lines: PIV  DVT Prophylaxis: BID therapeutic Lovenox   Stress Gastritis Prophylaxis: not indicated  Disposition: transfer to floor    I saw and evaluated the patient personally. I have reviewed and agree with note above. Treatment plan discussed with SICU team, nurses and primary team at the time of the multidisciplinary rounds.     Kush May MD  Trauma/ACS/SCC Attending

## 2023-12-02 NOTE — PROGRESS NOTE ADULT - SUBJECTIVE AND OBJECTIVE BOX
GENERAL SURGERY PROGRESS NOTE     MAURILIO HENSON  73y  Female  Hospital day :2d      OVERNIGHT EVENTS:  12/1   NIght  - AAox3, in NAD, palpable DP, PT b/l  - d/g, pending bed  - BM:     12/1  DAY  -Started Therapeutic Lovenox while Xarelto on hold. Cleared with orttho   -added miralax   -ortho: high risk proceudre. pt and familuy still deciding. c/w DVT pphx, OOBTC TTWB RLE    T(F): 98 (12-01-23 @ 20:00), Max: 98.5 (12-01-23 @ 04:00)  HR: 79 (12-01-23 @ 20:00) (60 - 90)  BP: 158/69 (12-01-23 @ 20:00) (115/57 - 158/69)  ABP: --  ABP(mean): --  RR: 42 (12-01-23 @ 20:00) (15 - 42)  SpO2: 96% (12-01-23 @ 20:00) (92% - 100%)      GI proph:    AC/ proph: enoxaparin Injectable 45 milliGRAM(s) SubCutaneous every 12 hours      PHYSICAL EXAM:  GENERAL: NAD  CHEST/LUNG: Clear to auscultation bilaterally  HEART: Regular rate and rhythm  ABDOMEN: Soft, Nontender, Nondistended;   MSK: RLE pain at hip; R knee and R elbow abrasion       LABS  Labs:  CAPILLARY BLOOD GLUCOSE                              8.5    7.07  )-----------( 256      ( 01 Dec 2023 20:31 )             26.2         12-01    137  |  103  |  14  ----------------------------<  108<H>  4.0   |  23  |  0.6<L>      Calcium: 8.4 mg/dL (12-01-23 @ 20:31)         Coags:     13.20  ----< 1.16    ( 30 Nov 2023 22:12 )     x                   Urinalysis Basic - ( 01 Dec 2023 20:31 )    Color: x / Appearance: x / SG: x / pH: x  Gluc: 108 mg/dL / Ketone: x  / Bili: x / Urobili: x   Blood: x / Protein: x / Nitrite: x   Leuk Esterase: x / RBC: x / WBC x   Sq Epi: x / Non Sq Epi: x / Bacteria: x          RADIOLOGY & ADDITIONAL TESTS:  < from: Xray Knee 1 or 2 Views, Right (11.30.23 @ 10:23) >  Findings/  impression:  No acute fracture or dislocation. Diffuse osteopenia. Moderate medial   compartment and mild lateral and patellofemoral compartment   osteoarthritis. Trace knee joint fluid. There are vascular calcifications.             GENERAL SURGERY PROGRESS NOTE     MAURILIO HENSON  73y  Female  Hospital day :2d      OVERNIGHT EVENTS:  12/1   NIght  - AAox3, in NAD, palpable DP, PT b/l  - d/g, pending bed  - BM: + gas, +pellets bm    12/1  DAY  -Started Therapeutic Lovenox while Xarelto on hold. Cleared with orttho   -added miralax   -ortho: high risk proceudre. pt and familuy still deciding. c/w DVT pphx, OOBTC TTWB RLE    T(F): 98 (12-01-23 @ 20:00), Max: 98.5 (12-01-23 @ 04:00)  HR: 79 (12-01-23 @ 20:00) (60 - 90)  BP: 158/69 (12-01-23 @ 20:00) (115/57 - 158/69)  ABP: --  ABP(mean): --  RR: 42 (12-01-23 @ 20:00) (15 - 42)  SpO2: 96% (12-01-23 @ 20:00) (92% - 100%)      GI proph:    AC/ proph: enoxaparin Injectable 45 milliGRAM(s) SubCutaneous every 12 hours      PHYSICAL EXAM:  GENERAL: NAD  CHEST/LUNG: Clear to auscultation bilaterally  HEART: Regular rate and rhythm  ABDOMEN: Soft, Nontender, Nondistended;   MSK: RLE pain at hip; R knee and R elbow abrasion       LABS  Labs:  CAPILLARY BLOOD GLUCOSE                              8.5    7.07  )-----------( 256      ( 01 Dec 2023 20:31 )             26.2         12-01    137  |  103  |  14  ----------------------------<  108<H>  4.0   |  23  |  0.6<L>      Calcium: 8.4 mg/dL (12-01-23 @ 20:31)         Coags:     13.20  ----< 1.16    ( 30 Nov 2023 22:12 )     x                   Urinalysis Basic - ( 01 Dec 2023 20:31 )    Color: x / Appearance: x / SG: x / pH: x  Gluc: 108 mg/dL / Ketone: x  / Bili: x / Urobili: x   Blood: x / Protein: x / Nitrite: x   Leuk Esterase: x / RBC: x / WBC x   Sq Epi: x / Non Sq Epi: x / Bacteria: x          RADIOLOGY & ADDITIONAL TESTS:  < from: Xray Knee 1 or 2 Views, Right (11.30.23 @ 10:23) >  Findings/  impression:  No acute fracture or dislocation. Diffuse osteopenia. Moderate medial   compartment and mild lateral and patellofemoral compartment   osteoarthritis. Trace knee joint fluid. There are vascular calcifications.

## 2023-12-03 LAB
GLUCOSE BLDC GLUCOMTR-MCNC: 103 MG/DL — HIGH (ref 70–99)
GLUCOSE BLDC GLUCOMTR-MCNC: 103 MG/DL — HIGH (ref 70–99)
GLUCOSE BLDC GLUCOMTR-MCNC: 118 MG/DL — HIGH (ref 70–99)
GLUCOSE BLDC GLUCOMTR-MCNC: 118 MG/DL — HIGH (ref 70–99)
GLUCOSE BLDC GLUCOMTR-MCNC: 87 MG/DL — SIGNIFICANT CHANGE UP (ref 70–99)
GLUCOSE BLDC GLUCOMTR-MCNC: 87 MG/DL — SIGNIFICANT CHANGE UP (ref 70–99)
GLUCOSE BLDC GLUCOMTR-MCNC: 98 MG/DL — SIGNIFICANT CHANGE UP (ref 70–99)
GLUCOSE BLDC GLUCOMTR-MCNC: 98 MG/DL — SIGNIFICANT CHANGE UP (ref 70–99)

## 2023-12-03 PROCEDURE — 99231 SBSQ HOSP IP/OBS SF/LOW 25: CPT

## 2023-12-03 PROCEDURE — 99232 SBSQ HOSP IP/OBS MODERATE 35: CPT

## 2023-12-03 RX ORDER — ALBUTEROL 90 UG/1
1 AEROSOL, METERED ORAL EVERY 4 HOURS
Refills: 0 | Status: DISCONTINUED | OUTPATIENT
Start: 2023-12-03 | End: 2023-12-04

## 2023-12-03 RX ORDER — IPRATROPIUM/ALBUTEROL SULFATE 18-103MCG
3 AEROSOL WITH ADAPTER (GRAM) INHALATION EVERY 6 HOURS
Refills: 0 | Status: DISCONTINUED | OUTPATIENT
Start: 2023-12-03 | End: 2023-12-04

## 2023-12-03 RX ORDER — ALBUTEROL 90 UG/1
2.5 AEROSOL, METERED ORAL EVERY 6 HOURS
Refills: 0 | Status: DISCONTINUED | OUTPATIENT
Start: 2023-12-03 | End: 2023-12-04

## 2023-12-03 RX ADMIN — SENNA PLUS 2 TABLET(S): 8.6 TABLET ORAL at 21:45

## 2023-12-03 RX ADMIN — CHLORHEXIDINE GLUCONATE 1 APPLICATION(S): 213 SOLUTION TOPICAL at 12:49

## 2023-12-03 RX ADMIN — ATORVASTATIN CALCIUM 10 MILLIGRAM(S): 80 TABLET, FILM COATED ORAL at 21:51

## 2023-12-03 RX ADMIN — Medication 650 MILLIGRAM(S): at 00:40

## 2023-12-03 RX ADMIN — POLYETHYLENE GLYCOL 3350 17 GRAM(S): 17 POWDER, FOR SOLUTION ORAL at 12:50

## 2023-12-03 RX ADMIN — Medication 650 MILLIGRAM(S): at 12:50

## 2023-12-03 RX ADMIN — Medication 650 MILLIGRAM(S): at 00:16

## 2023-12-03 RX ADMIN — Medication 50 MICROGRAM(S): at 05:17

## 2023-12-03 RX ADMIN — OXYCODONE HYDROCHLORIDE 5 MILLIGRAM(S): 5 TABLET ORAL at 00:56

## 2023-12-03 RX ADMIN — OXYCODONE HYDROCHLORIDE 5 MILLIGRAM(S): 5 TABLET ORAL at 21:47

## 2023-12-03 RX ADMIN — Medication 650 MILLIGRAM(S): at 12:54

## 2023-12-03 RX ADMIN — Medication 25 MILLIGRAM(S): at 17:10

## 2023-12-03 RX ADMIN — Medication 650 MILLIGRAM(S): at 17:10

## 2023-12-03 RX ADMIN — BUDESONIDE AND FORMOTEROL FUMARATE DIHYDRATE 2 PUFF(S): 160; 4.5 AEROSOL RESPIRATORY (INHALATION) at 12:49

## 2023-12-03 RX ADMIN — ENOXAPARIN SODIUM 45 MILLIGRAM(S): 100 INJECTION SUBCUTANEOUS at 05:17

## 2023-12-03 RX ADMIN — TIOTROPIUM BROMIDE 2 PUFF(S): 18 CAPSULE ORAL; RESPIRATORY (INHALATION) at 08:49

## 2023-12-03 RX ADMIN — Medication 650 MILLIGRAM(S): at 17:23

## 2023-12-03 RX ADMIN — Medication 25 MILLIGRAM(S): at 05:18

## 2023-12-03 RX ADMIN — Medication 650 MILLIGRAM(S): at 05:17

## 2023-12-03 RX ADMIN — ENOXAPARIN SODIUM 45 MILLIGRAM(S): 100 INJECTION SUBCUTANEOUS at 17:09

## 2023-12-03 NOTE — PROGRESS NOTE ADULT - SUBJECTIVE AND OBJECTIVE BOX
GENERAL SURGERY PROGRESS NOTE     NATIVIDADMAURILIO  73y  Female  Hospital day :3d     OVERNIGHT EVENTS:     - YESSICA  - Patient remained HDS and afebrile  - Walked 4 ft w/ PT w/ RW  - [facility] sent to all  SNFs  - Duoneb and albuterol started - has not been c/o respiratory symptoms     T(F): 96.8 (12-03-23 @ 00:17), Max: 98.2 (12-02-23 @ 12:00)  HR: 103 (12-03-23 @ 00:17) (66 - 103)  BP: 131/59 (12-03-23 @ 00:17) (112/62 - 159/79)  ABP: --  ABP(mean): --  RR: 18 (12-03-23 @ 00:17) (18 - 47)  SpO2: 96% (12-03-23 @ 00:17) (93% - 100%)    DIET/FLUIDS:         GI proph:    AC/ proph: enoxaparin Injectable 45 milliGRAM(s) SubCutaneous every 12 hours    ABx:     PHYSICAL EXAM:  GENERAL: NAD   ABDOMEN: Soft, Nontender, Nondistended;   EXTREMITIES:  No clubbing, cyanosis, or edema      LABS  Labs:  CAPILLARY BLOOD GLUCOSE      POCT Blood Glucose.: 111 mg/dL (02 Dec 2023 22:01)                          8.1    7.84  )-----------( 273      ( 02 Dec 2023 22:01 )             24.3       Auto Neutrophil %: 76.2 % (12-02-23 @ 22:01)  Auto Immature Granulocyte %: 0.5 % (12-02-23 @ 22:01)    12-02    134<L>  |  102  |  12  ----------------------------<  106<H>  4.5   |  26  |  0.5<L>      Calcium: 8.3 mg/dL (12-02-23 @ 22:01)       Urinalysis Basic - ( 02 Dec 2023 22:01 )    Color: x / Appearance: x / SG: x / pH: x  Gluc: 106 mg/dL / Ketone: x  / Bili: x / Urobili: x   Blood: x / Protein: x / Nitrite: x   Leuk Esterase: x / RBC: x / WBC x   Sq Epi: x / Non Sq Epi: x / Bacteria: x            RADIOLOGY & ADDITIONAL TESTS:      A/P    73y Female s/p mechanical fall, with acute R comminuted acetabular fracture. Patient being managed non-operatively.       Plan:	  - supportive care  - pain management  - incentive spirometer   - DVT prophylaxis w/ SCDs  - GI prophylaxis  - repeat studies as needed  - replace electrolytes  - F/u [facility] and SNF  - Duoneb and albuterol added

## 2023-12-03 NOTE — PROGRESS NOTE ADULT - ASSESSMENT
73y Female PMHx of Afib (on Xarelto), h/o DVT/PE, s/p IVC filter, HTN, hypothyroidism, COPD/emphysema, ex-smoker (quit >16 years ago) who s/p mechanical fall sustained a right acetabular fracture and retroperitoneal hematoma     #Rt acetabular fracture  - pain per primary team - oxy IR 5mg q6hrs for severe pain, oxy IR 2.5mg q4h prn for mod pain   - Ortho and pt discussed risks and benefits of possible acetabular fixation, pt has opted for conservative management with no surgical intervention   - TTWB to the right leg - weight bearing status     #COPD/emphysema  -on 2L NC but not in exacerbation, not wheezing; would try to wean off O2, can keep sats 88-92%   -c/w symbicort, spiriva   -c/w Duoneb q4h standing and prn  -c/w albuterol q6h prn      #Iliopsoas hematoma/retroperitoneal hematoma  -serial h/h   -transfuse <7  -holding home xarelto; pt now on therapeutic lovenox   - recommend bid h/h trending, noted drifting down     #Macrocytic anemia  -send b12, folate, tsh, iron studies    -would check CMP instead of BMP to see if bilirubin high, but doubt hemolyzing; if high send hemolysis panel     #Afib  -currently rate controlled  -continue metoprolol  -cards risk stratification for OR appreciated     #HTN  -continue home lisinopril and metoprolol  -Goal <140/90    #Constipation- resolved  -2 BM in last 24 hours   -no abd pain  -c/w senna  -c/w miralax       Thank you for the courtesy of this consult. Please reach out to me at [phone] with any questions.   Plan discussed with Trauma at [phone].  73y Female PMHx of Afib (on Xarelto), h/o DVT/PE, s/p IVC filter, HTN, hypothyroidism, COPD/emphysema, ex-smoker (quit >16 years ago) who s/p mechanical fall sustained a right acetabular fracture and retroperitoneal hematoma     #Rt acetabular fracture  - pain per primary team - oxy IR 5mg q6hrs for severe pain, oxy IR 2.5mg q4h prn for mod pain   - Ortho and pt discussed risks and benefits of possible acetabular fixation, pt has opted for conservative management with no surgical intervention   - TTWB to the right leg - weight bearing status     #COPD/emphysema  -on 2L NC but not in exacerbation, not wheezing; would try to wean off O2, can keep sats 88-92%   -c/w symbicort, spiriva   -c/w Duoneb q4h standing and prn  -c/w albuterol q6h prn      #Iliopsoas hematoma/retroperitoneal hematoma  -serial h/h   -transfuse <7  -holding home xarelto; pt now on therapeutic lovenox   - recommend bid h/h trending, noted drifting down     #Macrocytic anemia  -send b12, folate, tsh, iron studies    -would check CMP instead of BMP to see if bilirubin high, but doubt hemolyzing; if high send hemolysis panel     #Afib  -currently rate controlled  -continue metoprolol  -cards risk stratification for OR appreciated     #HTN  -continue home lisinopril and metoprolol  -Goal <140/90    #Constipation- resolved  -2 BM in last 24 hours   -no abd pain  -c/w senna  -c/w miralax       Thank you for the courtesy of this consult. Please reach out to me at [phone] with any questions.   Plan discussed with Trauma at [phone].  73y Female PMHx of Afib (on Xarelto), h/o DVT/PE, s/p IVC filter, HTN, hypothyroidism, COPD/emphysema, ex-smoker (quit >16 years ago) who s/p mechanical fall sustained a right acetabular fracture and retroperitoneal hematoma     #Rt acetabular fracture  - pain per primary team - oxy IR 5mg q6hrs for severe pain, oxy IR 2.5mg q4h prn for mod pain   - Ortho and pt discussed risks and benefits of possible acetabular fixation, pt has opted for conservative management with no surgical intervention   - TTWB to the right leg - weight bearing status     #COPD/emphysema  -on 2L NC but not in exacerbation, not wheezing; would try to wean off O2, can keep sats 88-92%   -c/w symbicort, spiriva   -c/w Duoneb q4h standing and prn  -c/w albuterol q6h prn      #Iliopsoas hematoma/retroperitoneal hematoma  -serial h/h   -transfuse <7  -holding home xarelto; pt now on therapeutic lovenox   - recommend bid h/h trending, noted drifting down     #Macrocytic anemia  -send b12, folate, tsh, iron studies    -would check CMP instead of BMP to see if bilirubin high, but doubt hemolyzing; if high send hemolysis panel     #Afib  -currently rate controlled  -continue metoprolol  -cards risk stratification for OR appreciated     #HTN  -continue home lisinopril and metoprolol  -Goal <140/90    #Constipation- resolved  -2 BM in last 24 hours   -no abd pain  -c/w senna  -c/w miralax       Thank you for the courtesy of this consult. Please reach out to me at [phone] with any questions.   Plan discussed with Trauma at [phone].

## 2023-12-03 NOTE — PROGRESS NOTE ADULT - ATTENDING COMMENTS
No acute events overnight. Had BM overnight.    TTWB for RLE acetabular fracture  Continue home COPD meds - spiriva/symbicort  Continue metoprolol for hx of afib and HTN  Atorvastatin for hx of HLD  Dispo planning for rehab    Kush May MD  Trauma/ACS/Surgical Critical Care Attending No acute events overnight. Had BM overnight.    TTWB for RLE acetabular fracture  Continue home COPD meds - spiriva/symbicort  Continue metoprolol for hx of afib and HTN  Atorvastatin for hx of HLD  Synthroid for hypothyroidism  Dispo planning for rehab    Kush May MD  Trauma/ACS/Surgical Critical Care Attending

## 2023-12-03 NOTE — PROGRESS NOTE ADULT - TIME BILLING
Total time spent to complete patient's bedside assessment, physical examination, review medical chart including labs & imaging, discuss medical plan of care with housestaff was more than 35 minutes
Total time spent to complete patient's bedside assessment, physical examination, review medical chart including labs & imaging, discuss medical plan of care with housestaff was more than 35 minutes

## 2023-12-03 NOTE — PROGRESS NOTE ADULT - SUBJECTIVE AND OBJECTIVE BOX
MAURILIO HENSON  [facility]-N 4C (Back) [address] (SI-N 4C (Back))    ***My note supersedes ALL resident notes that I sign.  My corrections for their notes are in my note.***    Patient is a 73y old  Female who presents with a chief complaint of S/P mechanical fall (-HT, -LOC, +AC) (03 Dec 2023 01:28)        Interval events:   Patient seen and examined at bedside. No acute events overnight. Finally had 2 BM. Says breathing is fine. Would try to wean O2. No bleeding. Pain controlled with pain meds.     -PMHx: Atrial fibrillation    Hypothyroid    HTN (hypertension)    Hyperlipidemia    History of blood clotting disorder      -PSHx:S/P IVC filter            REVIEW OF SYSTEMS:  CONSTITUTIONAL: No fever, weight loss, or fatigue  RESPIRATORY: No cough, wheezing, chills or hemoptysis; No shortness of breath  CARDIOVASCULAR: No chest pain, palpitations, dizziness, or leg swelling  GASTROINTESTINAL: No abdominal or epigastric pain. No nausea, vomiting, or hematemesis; No diarrhea or constipation. No melena or hematochezia.  NEUROLOGICAL: No headaches  LYMPH NODES: No enlarged glands  MUSCULOSKELETAL: as above       T(C): , Max: 36.7 (12-02-23 @ 21:00)  HR: 73 (12-03-23 @ 09:05)  BP: 162/72 (12-03-23 @ 09:05)  RR: 18 (12-03-23 @ 09:05)  SpO2: 96% (12-03-23 @ 04:52)  CAPILLARY BLOOD GLUCOSE      POCT Blood Glucose.: 118 mg/dL (03 Dec 2023 11:36)  POCT Blood Glucose.: 87 mg/dL (03 Dec 2023 08:22)  POCT Blood Glucose.: 111 mg/dL (02 Dec 2023 22:01)           PHYSICAL EXAM:  General: NAD, A/O x 3, on 2L NC   ENT: MMM  Neck: Supple, No JVD  Lungs: diminished bilaterally, no wheezing   Cardio: RRR, S1/S2, 2/6 systolic murmur   Abdomen: Soft, Nontender, Nondistended; Bowel sounds present  Extremities: No cyanosis, No edema      LABS:          8.1  7.84  )-------(273          24.3  N=76.2  L=12.1  QXS=862.8    134<L>|102|12  ------------------<106<H>  4.5|26|0.5<L>  eGFR:--  Ca:8.3<L>            Microbiology:      RADIOLOGY & ADDITIONAL TESTS:        Medications:  acetaminophen     Tablet .. 650 milliGRAM(s) Oral every 6 hours  albuterol    0.083% 2.5 milliGRAM(s) Nebulizer every 6 hours PRN  albuterol    90 MICROgram(s) HFA Inhaler 1 Puff(s) Inhalation every 4 hours PRN  albuterol/ipratropium for Nebulization 3 milliLiter(s) Nebulizer every 6 hours PRN  atorvastatin 10 milliGRAM(s) Oral at bedtime  bisacodyl Suppository 10 milliGRAM(s) Rectal daily  budesonide 160 MICROgram(s)/formoterol 4.5 MICROgram(s) Inhaler 2 Puff(s) Inhalation two times a day  chlorhexidine 2% Cloths 1 Application(s) Topical daily  enoxaparin Injectable 45 milliGRAM(s) SubCutaneous every 12 hours  levothyroxine 75 MICROGram(s) Oral daily  levothyroxine 50 MICROGram(s) Oral daily  metoprolol tartrate 25 milliGRAM(s) Oral every 12 hours  oxyCODONE    IR 2.5 milliGRAM(s) Oral every 4 hours PRN  oxyCODONE    IR 5 milliGRAM(s) Oral every 6 hours PRN  polyethylene glycol 3350 17 Gram(s) Oral daily  senna 2 Tablet(s) Oral at bedtime  tiotropium 2.5 MICROgram(s) Inhaler 2 Puff(s) Inhalation daily         MAURILIO HENSON  [facility]-N 4C (Back) 021 A (SI-N 4C (Back))    ***My note supersedes ALL resident notes that I sign.  My corrections for their notes are in my note.***    Patient is a 73y old  Female who presents with a chief complaint of S/P mechanical fall (-HT, -LOC, +AC) (03 Dec 2023 01:28)        Interval events:   Patient seen and examined at bedside. No acute events overnight. Finally had 2 BM. Says breathing is fine. Would try to wean O2. No bleeding. Pain controlled with pain meds.     -PMHx: Atrial fibrillation    Hypothyroid    HTN (hypertension)    Hyperlipidemia    History of blood clotting disorder      -PSHx:S/P IVC filter            REVIEW OF SYSTEMS:  CONSTITUTIONAL: No fever, weight loss, or fatigue  RESPIRATORY: No cough, wheezing, chills or hemoptysis; No shortness of breath  CARDIOVASCULAR: No chest pain, palpitations, dizziness, or leg swelling  GASTROINTESTINAL: No abdominal or epigastric pain. No nausea, vomiting, or hematemesis; No diarrhea or constipation. No melena or hematochezia.  NEUROLOGICAL: No headaches  LYMPH NODES: No enlarged glands  MUSCULOSKELETAL: as above       T(C): , Max: 36.7 (12-02-23 @ 21:00)  HR: 73 (12-03-23 @ 09:05)  BP: 162/72 (12-03-23 @ 09:05)  RR: 18 (12-03-23 @ 09:05)  SpO2: 96% (12-03-23 @ 04:52)  CAPILLARY BLOOD GLUCOSE      POCT Blood Glucose.: 118 mg/dL (03 Dec 2023 11:36)  POCT Blood Glucose.: 87 mg/dL (03 Dec 2023 08:22)  POCT Blood Glucose.: 111 mg/dL (02 Dec 2023 22:01)           PHYSICAL EXAM:  General: NAD, A/O x 3, on 2L NC   ENT: MMM  Neck: Supple, No JVD  Lungs: diminished bilaterally, no wheezing   Cardio: RRR, S1/S2, 2/6 systolic murmur   Abdomen: Soft, Nontender, Nondistended; Bowel sounds present  Extremities: No cyanosis, No edema      LABS:          8.1  7.84  )-------(273          24.3  N=76.2  L=12.1  BAL=568.8    134<L>|102|12  ------------------<106<H>  4.5|26|0.5<L>  eGFR:--  Ca:8.3<L>            Microbiology:      RADIOLOGY & ADDITIONAL TESTS:        Medications:  acetaminophen     Tablet .. 650 milliGRAM(s) Oral every 6 hours  albuterol    0.083% 2.5 milliGRAM(s) Nebulizer every 6 hours PRN  albuterol    90 MICROgram(s) HFA Inhaler 1 Puff(s) Inhalation every 4 hours PRN  albuterol/ipratropium for Nebulization 3 milliLiter(s) Nebulizer every 6 hours PRN  atorvastatin 10 milliGRAM(s) Oral at bedtime  bisacodyl Suppository 10 milliGRAM(s) Rectal daily  budesonide 160 MICROgram(s)/formoterol 4.5 MICROgram(s) Inhaler 2 Puff(s) Inhalation two times a day  chlorhexidine 2% Cloths 1 Application(s) Topical daily  enoxaparin Injectable 45 milliGRAM(s) SubCutaneous every 12 hours  levothyroxine 75 MICROGram(s) Oral daily  levothyroxine 50 MICROGram(s) Oral daily  metoprolol tartrate 25 milliGRAM(s) Oral every 12 hours  oxyCODONE    IR 2.5 milliGRAM(s) Oral every 4 hours PRN  oxyCODONE    IR 5 milliGRAM(s) Oral every 6 hours PRN  polyethylene glycol 3350 17 Gram(s) Oral daily  senna 2 Tablet(s) Oral at bedtime  tiotropium 2.5 MICROgram(s) Inhaler 2 Puff(s) Inhalation daily         MAURILIO HENSON  [facility]-N 4C (Back) 021 A (SI-N 4C (Back))    ***My note supersedes ALL resident notes that I sign.  My corrections for their notes are in my note.***    Patient is a 73y old  Female who presents with a chief complaint of S/P mechanical fall (-HT, -LOC, +AC) (03 Dec 2023 01:28)        Interval events:   Patient seen and examined at bedside. No acute events overnight. Finally had 2 BM. Says breathing is fine. Would try to wean O2. No bleeding. Pain controlled with pain meds.     -PMHx: Atrial fibrillation    Hypothyroid    HTN (hypertension)    Hyperlipidemia    History of blood clotting disorder      -PSHx:S/P IVC filter            REVIEW OF SYSTEMS:  CONSTITUTIONAL: No fever, weight loss, or fatigue  RESPIRATORY: No cough, wheezing, chills or hemoptysis; No shortness of breath  CARDIOVASCULAR: No chest pain, palpitations, dizziness, or leg swelling  GASTROINTESTINAL: No abdominal or epigastric pain. No nausea, vomiting, or hematemesis; No diarrhea or constipation. No melena or hematochezia.  NEUROLOGICAL: No headaches  LYMPH NODES: No enlarged glands  MUSCULOSKELETAL: as above       T(C): , Max: 36.7 (12-02-23 @ 21:00)  HR: 73 (12-03-23 @ 09:05)  BP: 162/72 (12-03-23 @ 09:05)  RR: 18 (12-03-23 @ 09:05)  SpO2: 96% (12-03-23 @ 04:52)  CAPILLARY BLOOD GLUCOSE      POCT Blood Glucose.: 118 mg/dL (03 Dec 2023 11:36)  POCT Blood Glucose.: 87 mg/dL (03 Dec 2023 08:22)  POCT Blood Glucose.: 111 mg/dL (02 Dec 2023 22:01)           PHYSICAL EXAM:  General: NAD, A/O x 3, on 2L NC   ENT: MMM  Neck: Supple, No JVD  Lungs: diminished bilaterally, no wheezing   Cardio: RRR, S1/S2, 2/6 systolic murmur   Abdomen: Soft, Nontender, Nondistended; Bowel sounds present  Extremities: No cyanosis, No edema      LABS:          8.1  7.84  )-------(273          24.3  N=76.2  L=12.1  DJJ=918.8    134<L>|102|12  ------------------<106<H>  4.5|26|0.5<L>  eGFR:--  Ca:8.3<L>            Microbiology:      RADIOLOGY & ADDITIONAL TESTS:        Medications:  acetaminophen     Tablet .. 650 milliGRAM(s) Oral every 6 hours  albuterol    0.083% 2.5 milliGRAM(s) Nebulizer every 6 hours PRN  albuterol    90 MICROgram(s) HFA Inhaler 1 Puff(s) Inhalation every 4 hours PRN  albuterol/ipratropium for Nebulization 3 milliLiter(s) Nebulizer every 6 hours PRN  atorvastatin 10 milliGRAM(s) Oral at bedtime  bisacodyl Suppository 10 milliGRAM(s) Rectal daily  budesonide 160 MICROgram(s)/formoterol 4.5 MICROgram(s) Inhaler 2 Puff(s) Inhalation two times a day  chlorhexidine 2% Cloths 1 Application(s) Topical daily  enoxaparin Injectable 45 milliGRAM(s) SubCutaneous every 12 hours  levothyroxine 75 MICROGram(s) Oral daily  levothyroxine 50 MICROGram(s) Oral daily  metoprolol tartrate 25 milliGRAM(s) Oral every 12 hours  oxyCODONE    IR 2.5 milliGRAM(s) Oral every 4 hours PRN  oxyCODONE    IR 5 milliGRAM(s) Oral every 6 hours PRN  polyethylene glycol 3350 17 Gram(s) Oral daily  senna 2 Tablet(s) Oral at bedtime  tiotropium 2.5 MICROgram(s) Inhaler 2 Puff(s) Inhalation daily

## 2023-12-04 ENCOUNTER — TRANSCRIPTION ENCOUNTER (OUTPATIENT)
Age: 73
End: 2023-12-04

## 2023-12-04 VITALS
SYSTOLIC BLOOD PRESSURE: 123 MMHG | RESPIRATION RATE: 18 BRPM | DIASTOLIC BLOOD PRESSURE: 62 MMHG | HEART RATE: 100 BPM | TEMPERATURE: 98 F | OXYGEN SATURATION: 95 %

## 2023-12-04 LAB
ALBUMIN SERPL ELPH-MCNC: 2.6 G/DL — LOW (ref 3.5–5.2)
ALBUMIN SERPL ELPH-MCNC: 2.6 G/DL — LOW (ref 3.5–5.2)
ALP SERPL-CCNC: 98 U/L — SIGNIFICANT CHANGE UP (ref 30–115)
ALP SERPL-CCNC: 98 U/L — SIGNIFICANT CHANGE UP (ref 30–115)
ALT FLD-CCNC: 13 U/L — SIGNIFICANT CHANGE UP (ref 0–41)
ALT FLD-CCNC: 13 U/L — SIGNIFICANT CHANGE UP (ref 0–41)
ANION GAP SERPL CALC-SCNC: 10 MMOL/L — SIGNIFICANT CHANGE UP (ref 7–14)
ANION GAP SERPL CALC-SCNC: 10 MMOL/L — SIGNIFICANT CHANGE UP (ref 7–14)
AST SERPL-CCNC: 24 U/L — SIGNIFICANT CHANGE UP (ref 0–41)
AST SERPL-CCNC: 24 U/L — SIGNIFICANT CHANGE UP (ref 0–41)
BASOPHILS # BLD AUTO: 0.03 K/UL — SIGNIFICANT CHANGE UP (ref 0–0.2)
BASOPHILS # BLD AUTO: 0.03 K/UL — SIGNIFICANT CHANGE UP (ref 0–0.2)
BASOPHILS NFR BLD AUTO: 0.4 % — SIGNIFICANT CHANGE UP (ref 0–1)
BASOPHILS NFR BLD AUTO: 0.4 % — SIGNIFICANT CHANGE UP (ref 0–1)
BILIRUB SERPL-MCNC: 0.6 MG/DL — SIGNIFICANT CHANGE UP (ref 0.2–1.2)
BILIRUB SERPL-MCNC: 0.6 MG/DL — SIGNIFICANT CHANGE UP (ref 0.2–1.2)
BUN SERPL-MCNC: 9 MG/DL — LOW (ref 10–20)
BUN SERPL-MCNC: 9 MG/DL — LOW (ref 10–20)
CALCIUM SERPL-MCNC: 8.4 MG/DL — SIGNIFICANT CHANGE UP (ref 8.4–10.5)
CALCIUM SERPL-MCNC: 8.4 MG/DL — SIGNIFICANT CHANGE UP (ref 8.4–10.5)
CHLORIDE SERPL-SCNC: 103 MMOL/L — SIGNIFICANT CHANGE UP (ref 98–110)
CHLORIDE SERPL-SCNC: 103 MMOL/L — SIGNIFICANT CHANGE UP (ref 98–110)
CO2 SERPL-SCNC: 23 MMOL/L — SIGNIFICANT CHANGE UP (ref 17–32)
CO2 SERPL-SCNC: 23 MMOL/L — SIGNIFICANT CHANGE UP (ref 17–32)
CREAT SERPL-MCNC: 0.5 MG/DL — LOW (ref 0.7–1.5)
CREAT SERPL-MCNC: 0.5 MG/DL — LOW (ref 0.7–1.5)
EGFR: 99 ML/MIN/1.73M2 — SIGNIFICANT CHANGE UP
EGFR: 99 ML/MIN/1.73M2 — SIGNIFICANT CHANGE UP
EOSINOPHIL # BLD AUTO: 0.02 K/UL — SIGNIFICANT CHANGE UP (ref 0–0.7)
EOSINOPHIL # BLD AUTO: 0.02 K/UL — SIGNIFICANT CHANGE UP (ref 0–0.7)
EOSINOPHIL NFR BLD AUTO: 0.2 % — SIGNIFICANT CHANGE UP (ref 0–8)
EOSINOPHIL NFR BLD AUTO: 0.2 % — SIGNIFICANT CHANGE UP (ref 0–8)
FERRITIN SERPL-MCNC: 346 NG/ML — HIGH (ref 13–330)
FERRITIN SERPL-MCNC: 346 NG/ML — HIGH (ref 13–330)
FOLATE SERPL-MCNC: 10 NG/ML — SIGNIFICANT CHANGE UP
FOLATE SERPL-MCNC: 10 NG/ML — SIGNIFICANT CHANGE UP
GLUCOSE SERPL-MCNC: 90 MG/DL — SIGNIFICANT CHANGE UP (ref 70–99)
GLUCOSE SERPL-MCNC: 90 MG/DL — SIGNIFICANT CHANGE UP (ref 70–99)
HCT VFR BLD CALC: 25.4 % — LOW (ref 37–47)
HCT VFR BLD CALC: 25.4 % — LOW (ref 37–47)
HGB BLD-MCNC: 8.5 G/DL — LOW (ref 12–16)
HGB BLD-MCNC: 8.5 G/DL — LOW (ref 12–16)
IMM GRANULOCYTES NFR BLD AUTO: 0.2 % — SIGNIFICANT CHANGE UP (ref 0.1–0.3)
IMM GRANULOCYTES NFR BLD AUTO: 0.2 % — SIGNIFICANT CHANGE UP (ref 0.1–0.3)
IRON SATN MFR SERPL: 10 UG/DL — LOW (ref 35–150)
IRON SATN MFR SERPL: 10 UG/DL — LOW (ref 35–150)
IRON SATN MFR SERPL: 5 % — LOW (ref 15–50)
IRON SATN MFR SERPL: 5 % — LOW (ref 15–50)
LYMPHOCYTES # BLD AUTO: 0.75 K/UL — LOW (ref 1.2–3.4)
LYMPHOCYTES # BLD AUTO: 0.75 K/UL — LOW (ref 1.2–3.4)
LYMPHOCYTES # BLD AUTO: 8.9 % — LOW (ref 20.5–51.1)
LYMPHOCYTES # BLD AUTO: 8.9 % — LOW (ref 20.5–51.1)
MAGNESIUM SERPL-MCNC: 1.7 MG/DL — LOW (ref 1.8–2.4)
MAGNESIUM SERPL-MCNC: 1.7 MG/DL — LOW (ref 1.8–2.4)
MCHC RBC-ENTMCNC: 33.5 G/DL — SIGNIFICANT CHANGE UP (ref 32–37)
MCHC RBC-ENTMCNC: 33.5 G/DL — SIGNIFICANT CHANGE UP (ref 32–37)
MCHC RBC-ENTMCNC: 34.6 PG — HIGH (ref 27–31)
MCHC RBC-ENTMCNC: 34.6 PG — HIGH (ref 27–31)
MCV RBC AUTO: 103.3 FL — HIGH (ref 81–99)
MCV RBC AUTO: 103.3 FL — HIGH (ref 81–99)
MONOCYTES # BLD AUTO: 0.88 K/UL — HIGH (ref 0.1–0.6)
MONOCYTES # BLD AUTO: 0.88 K/UL — HIGH (ref 0.1–0.6)
MONOCYTES NFR BLD AUTO: 10.4 % — HIGH (ref 1.7–9.3)
MONOCYTES NFR BLD AUTO: 10.4 % — HIGH (ref 1.7–9.3)
NEUTROPHILS # BLD AUTO: 6.75 K/UL — HIGH (ref 1.4–6.5)
NEUTROPHILS # BLD AUTO: 6.75 K/UL — HIGH (ref 1.4–6.5)
NEUTROPHILS NFR BLD AUTO: 79.9 % — HIGH (ref 42.2–75.2)
NEUTROPHILS NFR BLD AUTO: 79.9 % — HIGH (ref 42.2–75.2)
NRBC # BLD: 0 /100 WBCS — SIGNIFICANT CHANGE UP (ref 0–0)
NRBC # BLD: 0 /100 WBCS — SIGNIFICANT CHANGE UP (ref 0–0)
PHOSPHATE SERPL-MCNC: 3.3 MG/DL — SIGNIFICANT CHANGE UP (ref 2.1–4.9)
PHOSPHATE SERPL-MCNC: 3.3 MG/DL — SIGNIFICANT CHANGE UP (ref 2.1–4.9)
PLATELET # BLD AUTO: 300 K/UL — SIGNIFICANT CHANGE UP (ref 130–400)
PLATELET # BLD AUTO: 300 K/UL — SIGNIFICANT CHANGE UP (ref 130–400)
PMV BLD: 9.9 FL — SIGNIFICANT CHANGE UP (ref 7.4–10.4)
PMV BLD: 9.9 FL — SIGNIFICANT CHANGE UP (ref 7.4–10.4)
POTASSIUM SERPL-MCNC: 4.6 MMOL/L — SIGNIFICANT CHANGE UP (ref 3.5–5)
POTASSIUM SERPL-MCNC: 4.6 MMOL/L — SIGNIFICANT CHANGE UP (ref 3.5–5)
POTASSIUM SERPL-SCNC: 4.6 MMOL/L — SIGNIFICANT CHANGE UP (ref 3.5–5)
POTASSIUM SERPL-SCNC: 4.6 MMOL/L — SIGNIFICANT CHANGE UP (ref 3.5–5)
PROT SERPL-MCNC: 4.8 G/DL — LOW (ref 6–8)
PROT SERPL-MCNC: 4.8 G/DL — LOW (ref 6–8)
RBC # BLD: 2.46 M/UL — LOW (ref 4.2–5.4)
RBC # BLD: 2.46 M/UL — LOW (ref 4.2–5.4)
RBC # FLD: 16 % — HIGH (ref 11.5–14.5)
RBC # FLD: 16 % — HIGH (ref 11.5–14.5)
SARS-COV-2 RNA SPEC QL NAA+PROBE: SIGNIFICANT CHANGE UP
SODIUM SERPL-SCNC: 136 MMOL/L — SIGNIFICANT CHANGE UP (ref 135–146)
SODIUM SERPL-SCNC: 136 MMOL/L — SIGNIFICANT CHANGE UP (ref 135–146)
TIBC SERPL-MCNC: 186 UG/DL — LOW (ref 220–430)
TIBC SERPL-MCNC: 186 UG/DL — LOW (ref 220–430)
TSH SERPL-MCNC: 2.9 UIU/ML — SIGNIFICANT CHANGE UP (ref 0.27–4.2)
TSH SERPL-MCNC: 2.9 UIU/ML — SIGNIFICANT CHANGE UP (ref 0.27–4.2)
UIBC SERPL-MCNC: 176 UG/DL — SIGNIFICANT CHANGE UP (ref 110–370)
UIBC SERPL-MCNC: 176 UG/DL — SIGNIFICANT CHANGE UP (ref 110–370)
VIT B12 SERPL-MCNC: 1243 PG/ML — SIGNIFICANT CHANGE UP (ref 232–1245)
VIT B12 SERPL-MCNC: 1243 PG/ML — SIGNIFICANT CHANGE UP (ref 232–1245)
WBC # BLD: 8.45 K/UL — SIGNIFICANT CHANGE UP (ref 4.8–10.8)
WBC # BLD: 8.45 K/UL — SIGNIFICANT CHANGE UP (ref 4.8–10.8)
WBC # FLD AUTO: 8.45 K/UL — SIGNIFICANT CHANGE UP (ref 4.8–10.8)
WBC # FLD AUTO: 8.45 K/UL — SIGNIFICANT CHANGE UP (ref 4.8–10.8)

## 2023-12-04 PROCEDURE — 99238 HOSP IP/OBS DSCHRG MGMT 30/<: CPT

## 2023-12-04 RX ORDER — FERROUS SULFATE 325(65) MG
325 TABLET ORAL DAILY
Refills: 0 | Status: DISCONTINUED | OUTPATIENT
Start: 2023-12-04 | End: 2023-12-04

## 2023-12-04 RX ORDER — RIVAROXABAN 15 MG-20MG
KIT ORAL
Refills: 0 | Status: DISCONTINUED | OUTPATIENT
Start: 2023-12-04 | End: 2023-12-04

## 2023-12-04 RX ORDER — RIVAROXABAN 15 MG-20MG
20 KIT ORAL
Refills: 0 | Status: DISCONTINUED | OUTPATIENT
Start: 2023-12-04 | End: 2023-12-04

## 2023-12-04 RX ORDER — ACETAMINOPHEN 500 MG
2 TABLET ORAL
Qty: 0 | Refills: 0 | DISCHARGE
Start: 2023-12-04

## 2023-12-04 RX ORDER — MAGNESIUM SULFATE 500 MG/ML
2 VIAL (ML) INJECTION ONCE
Refills: 0 | Status: COMPLETED | OUTPATIENT
Start: 2023-12-04 | End: 2023-12-04

## 2023-12-04 RX ORDER — SENNA PLUS 8.6 MG/1
2 TABLET ORAL
Qty: 0 | Refills: 0 | DISCHARGE
Start: 2023-12-04

## 2023-12-04 RX ORDER — FERROUS SULFATE 325(65) MG
1 TABLET ORAL
Qty: 0 | Refills: 0 | DISCHARGE
Start: 2023-12-04

## 2023-12-04 RX ORDER — RIVAROXABAN 15 MG-20MG
20 KIT ORAL ONCE
Refills: 0 | Status: COMPLETED | OUTPATIENT
Start: 2023-12-04 | End: 2023-12-04

## 2023-12-04 RX ORDER — POLYETHYLENE GLYCOL 3350 17 G/17G
17 POWDER, FOR SOLUTION ORAL
Qty: 0 | Refills: 0 | DISCHARGE
Start: 2023-12-04

## 2023-12-04 RX ORDER — OXYCODONE HYDROCHLORIDE 5 MG/1
1 TABLET ORAL
Qty: 0 | Refills: 0 | DISCHARGE
Start: 2023-12-04

## 2023-12-04 RX ADMIN — Medication 10 MILLIGRAM(S): at 11:11

## 2023-12-04 RX ADMIN — ENOXAPARIN SODIUM 45 MILLIGRAM(S): 100 INJECTION SUBCUTANEOUS at 05:09

## 2023-12-04 RX ADMIN — CHLORHEXIDINE GLUCONATE 1 APPLICATION(S): 213 SOLUTION TOPICAL at 11:13

## 2023-12-04 RX ADMIN — Medication 50 MICROGRAM(S): at 05:08

## 2023-12-04 RX ADMIN — Medication 1 ENEMA: at 13:17

## 2023-12-04 RX ADMIN — Medication 650 MILLIGRAM(S): at 11:12

## 2023-12-04 RX ADMIN — OXYCODONE HYDROCHLORIDE 5 MILLIGRAM(S): 5 TABLET ORAL at 13:51

## 2023-12-04 RX ADMIN — POLYETHYLENE GLYCOL 3350 17 GRAM(S): 17 POWDER, FOR SOLUTION ORAL at 11:11

## 2023-12-04 RX ADMIN — Medication 25 GRAM(S): at 05:03

## 2023-12-04 RX ADMIN — Medication 25 MILLIGRAM(S): at 05:09

## 2023-12-04 RX ADMIN — Medication 650 MILLIGRAM(S): at 05:09

## 2023-12-04 RX ADMIN — Medication 325 MILLIGRAM(S): at 11:12

## 2023-12-04 NOTE — DISCHARGE NOTE PROVIDER - HOSPITAL COURSE
73F w/ PMHx of Afib (on Xarelto), h/o DVT/PE, s/p IVC filter, HTN, hypothyroidism, COPD/emphysema, ex-smoker (quit >16 years ago) who presents as a TRAUMA ALERT (transfer from [facility]) s/p mechanical fall (-HT -LOC +AC). Patient reportedly slipped and fell while in the kitchen yesterday afternoon. Complaining of right hip pain and inability to ambulate. She is accompanied by her  and son. She does not use an assistive device at baseline. Of note, patient has a h/o x2 prior falls, resulting in left pubic ramus fracture that was treated nonoperatively. Denies pain elsewhere. Denies any numbness or tingling. Reports her last Xarelto dose was the evening prior to presentation (11/28 PM). Injuries identified:   *Comminuted right acetabular fracture w/ iliopsoas hematoma*    She was admitted to the SICU for monitoring and q6hr CBCs in the setting of iliopsoas hematoma. She was seen by orthopedics for acetabular fx.   She was deemed a high risk for surgery from medicine and pulmonology due to her hx of advanced COPD.   Decision was made to treat fracture non-operatively due to risk.   She is toe touch weight bearing on the RLE. Hemoglobin remained stable after 1 unit of PRBC (on admission). therapeutic LVX started for AC (hx of DVT/PE) and AFib.   She is tolerating a regular diet. Hematoma is soft and non expanding.     She is voiding and pain is controlled with oral medications. Patient worked with PT and was deemed a candidate for Subacute rehab. COVID was negative.   Hospitalist saw patient for comanagement. Iron studies sent for hx of chronic anemia. She will continue on her iron pills.    73F w/ PMHx of Afib (on Xarelto), h/o DVT/PE, s/p IVC filter, HTN, hypothyroidism, COPD/emphysema, ex-smoker (quit >16 years ago) who presents as a TRAUMA ALERT (transfer from [facility]) s/p mechanical fall (-HT -LOC +AC). Patient reportedly slipped and fell while in the kitchen yesterday afternoon. Complaining of right hip pain and inability to ambulate. She is accompanied by her  and son. She does not use an assistive device at baseline. Of note, patient has a h/o x2 prior falls, resulting in left pubic ramus fracture that was treated nonoperatively. Denies pain elsewhere. Denies any numbness or tingling. Reports her last Xarelto dose was the evening prior to presentation (11/28 PM). Injuries identified:   *Comminuted right acetabular fracture w/ iliopsoas hematoma*    She was admitted to the SICU for monitoring and q6hr CBCs in the setting of iliopsoas hematoma. She was seen by orthopedics for acetabular fx.   She was deemed a high risk for surgery from medicine and pulmonology due to her hx of advanced COPD.   Decision was made to treat fracture non-operatively due to risk.   She is toe touch weight bearing on the RLE. Hemoglobin remained stable after 1 unit of PRBC (on admission). therapeutic LVX started for AC (hx of DVT/PE) and AFib.   She is tolerating a regular diet. Hematoma is soft and non expanding.     She is voiding and pain is controlled with oral medications. Patient worked with PT and was deemed a candidate for Subacute rehab. COVID was negative.   Hospitalist saw patient for comanagement. Iron studies sent for hx of chronic anemia. She will continue on her iron pills.

## 2023-12-04 NOTE — PROGRESS NOTE ADULT - ATTENDING COMMENTS
Patient is clinically stable.  Pain is controlled.  Hb stable.    ASSESSMENT:  74 y/o female, S/P Fall.  Right Acetabular Fracture.  Right Iliopsoas Hematoma.  Acute blood loss anemia.  Coagulopathy due to Xarelto.  At risk for hemodynamic instability.  Acute pain due to trauma.    PLAN:  - pain control  - ok for Xarelto  - IS  - SS for discharge planning Patient is clinically stable.  Pain is controlled.  Hb stable.    ASSESSMENT:  72 y/o female, S/P Fall.  Right Acetabular Fracture.  Right Iliopsoas Hematoma.  Acute blood loss anemia.  Coagulopathy due to Xarelto.  At risk for hemodynamic instability.  Acute pain due to trauma.    PLAN:  - pain control  - ok for Xarelto  - IS  - SS for discharge planning

## 2023-12-04 NOTE — DISCHARGE NOTE NURSING/CASE MANAGEMENT/SOCIAL WORK - NSDCPEFALRISK_GEN_ALL_CORE
For information on Fall & Injury Prevention, visit: [email or web address] OR  [email or web address] OR  https://www.cdc.gov/steadi/patient.html For information on Fall & Injury Prevention, visit: [email or web address] OR  [email or web address] OR  https://www.cdc.gov/steadi/patient.html

## 2023-12-04 NOTE — DISCHARGE NOTE NURSING/CASE MANAGEMENT/SOCIAL WORK - PATIENT PORTAL LINK FT
You can access the FollowMyHealth Patient Portal offered by [facility] by registering at the following website: [email or web address]. By joining [employer]’s FollowMyHealth portal, you will also be able to view your health information using other applications (apps) compatible with our system. You can access the FollowMyHealth Patient Portal offered by [facility] by registering at the following website: [email or web address]. By joining [facility]’s FollowMyHealth portal, you will also be able to view your health information using other applications (apps) compatible with our system.

## 2023-12-04 NOTE — DISCHARGE NOTE PROVIDER - CARE PROVIDER_API CALL
Christiano Luna  Orthopaedic Surgery  [address] [facility] [address] [place] [address]  Phone: [phone]  Fax: [phone]  Follow Up Time: 2 weeks   Christiano Luna  Orthopaedic Surgery  [address] [facility] [facility] [address] [place] [address]  Phone: [phone]  Fax: [phone]  Follow Up Time: 2 weeks

## 2023-12-04 NOTE — PROGRESS NOTE ADULT - REASON FOR ADMISSION
S/P mechanical fall (-HT, -LOC, +AC)

## 2023-12-04 NOTE — DISCHARGE NOTE PROVIDER - NSDCCPCAREPLAN_GEN_ALL_CORE_FT
PRINCIPAL DISCHARGE DIAGNOSIS  Diagnosis: Right acetabular fracture  Assessment and Plan of Treatment: You had a fall and were found to have a right acetabular fracture (right hip and pelvis). Decision was made to not undergo surgery.   You were also found to have a hematoma surroudning the right hip. You received a blood transfusion while in the hoospital.   You will be discharged to a short term rehabilitation facility to complete additional physical therapy.   You will cointinue on your home medications as previously prescribed. Ok to start taking xarelto.   You should follow up with your pulmonologist upon discharge.   Follow up with orthopedics in 2 weeks.   You may eat a regular diet without any restrictions.   If you experience severe pain, dizziness, lightheadedness, nausea, vomiting, numbness/tingling in your legs- call office or report to nearest Emergency Department.      SECONDARY DISCHARGE DIAGNOSES  Diagnosis: Hypotension  Assessment and Plan of Treatment:     Diagnosis: Hemorrhage requiring transfusion  Assessment and Plan of Treatment:     Diagnosis: Right acetabular fracture  Assessment and Plan of Treatment:

## 2023-12-04 NOTE — DISCHARGE NOTE PROVIDER - NSDCMRMEDTOKEN_GEN_ALL_CORE_FT
acetaminophen 325 mg oral tablet: 2 tab(s) orally every 6 hours  bisacodyl 10 mg rectal suppository: 1 suppository(ies) rectal once a day  ferrous sulfate 325 mg (65 mg elemental iron) oral tablet: 1 tab(s) orally once a day  fluticasone-salmeterol 500 mcg-50 mcg inhalation powder: 1 powder inhaled once a day  folic acid 1 mg oral tablet: 1 tablet orally once a day  levothyroxine 50 mcg (0.05 mg) oral capsule: 1 capsule orally Tuesday, Thursday, Saturday T TH S DASILVA  Lipitor 10 mg oral tablet: 1 tablet orally once a day (at bedtime)  METOPROL TAR 50MG TAB: 1 cap(s) orally once a day  oxyCODONE 5 mg oral tablet: 1 tab(s) orally every 6 hours As needed Severe Pain (7 - 10)  polyethylene glycol 3350 oral powder for reconstitution: 17 gram(s) orally once a day  senna leaf extract oral tablet: 2 tab(s) orally once a day (at bedtime)  Spiriva 18 mcg inhalation capsule: 1 cap(s) inhaled once a day  Synthroid 75 mcg (0.075 mg) oral tablet: 1 tablet orally Monday, Wednesday, and Friday M W F  Vitamin B12 1000 mcg oral tablet: 1 tab(s) orally once a day  XARELTO 20MG TAB: With dinner  Zestril 10 mg oral tablet: 1 tablet orally 2 times a day

## 2023-12-04 NOTE — DISCHARGE NOTE NURSING/CASE MANAGEMENT/SOCIAL WORK - NSDCVIVACCINE_GEN_ALL_CORE_FT
Tdap; 17-Nov-2022 15:14; Isatu Marion (ANGELIQUE); [employer]; [id number] (Exp. Date: 08-Dec-2024); IntraMuscular; Deltoid Right.; 0.5 milliLiter(s); VIS (VIS Published: 09-May-2013, VIS Presented: 17-Nov-2022);    Tdap; 17-Nov-2022 15:14; Isatu Marion (ANGELIQUE); [employer]; [id number] (Exp. Date: 08-Dec-2024); IntraMuscular; Deltoid Right.; 0.5 milliLiter(s); VIS (VIS Published: 09-May-2013, VIS Presented: 17-Nov-2022);

## 2023-12-04 NOTE — PROGRESS NOTE ADULT - SUBJECTIVE AND OBJECTIVE BOX
GENERAL SURGERY PROGRESS NOTE     MAURILIO HENSON  [address] [place] [address] day :4d    POD:  Procedure:   Surgical Attending: Sujey Chester  Overnight events:    NAD   HGB stable 8.5 from 8.1   iron panel sent for anemia work up  vitals wnl   TTWB RLE   covid sent for SNF  IS 1750   Mg repleted with 2g   tolerating diet  having bowel movements   pain controlled     T(F): 98.9 (12-04-23 @ 01:13), Max: 99.1 (12-03-23 @ 21:00)  HR: 97 (12-04-23 @ 01:13) (73 - 103)  BP: 138/74 (12-04-23 @ 01:13) (138/74 - 183/61)  ABP: --  ABP(mean): --  RR: 18 (12-04-23 @ 01:13) (18 - 18)  SpO2: 95% (12-04-23 @ 01:13) (95% - 96%)    IN'S / OUT's:    12-02-23 @ 07:01  -  12-03-23 @ 07:00  --------------------------------------------------------  IN:  Total IN: 0 mL    OUT:    Voided (mL): 600 mL  Total OUT: 600 mL    Total NET: -600 mL      12-03-23 @ 07:01  -  12-04-23 @ 03:58  --------------------------------------------------------  IN:  Total IN: 0 mL    OUT:    Voided (mL): 550 mL  Total OUT: 550 mL    Total NET: -550 mL          PHYSICAL EXAM:  GENERAL: NAD, well-appearing  CHEST/LUNG: Clear to auscultation bilaterally on 2 L nc   HEART: Regular rate and rhythm  ABDOMEN: Soft, Nontender, Nondistended;   EXTREMITIES:  No clubbing, cyanosis, or edema      LABS  Labs:  CAPILLARY BLOOD GLUCOSE      POCT Blood Glucose.: 103 mg/dL (03 Dec 2023 21:04)  POCT Blood Glucose.: 98 mg/dL (03 Dec 2023 17:21)  POCT Blood Glucose.: 118 mg/dL (03 Dec 2023 11:36)  POCT Blood Glucose.: 87 mg/dL (03 Dec 2023 08:22)                          8.5    8.45  )-----------( 300      ( 04 Dec 2023 00:55 )             25.4       Auto Neutrophil %: 79.9 % (12-04-23 @ 00:55)  Auto Immature Granulocyte %: 0.2 % (12-04-23 @ 00:55)    12-04    136  |  103  |  9<L>  ----------------------------<  90  4.6   |  23  |  0.5<L>      Calcium: 8.4 mg/dL (12-04-23 @ 00:55)      LFTs:             4.8  | 0.6  | 24       ------------------[98      ( 04 Dec 2023 00:55 )  2.6  | x    | 13          Lipase:x      Amylase:x             Coags:            Urinalysis Basic - ( 04 Dec 2023 00:55 )    Color: x / Appearance: x / SG: x / pH: x  Gluc: 90 mg/dL / Ketone: x  / Bili: x / Urobili: x   Blood: x / Protein: x / Nitrite: x   Leuk Esterase: x / RBC: x / WBC x   Sq Epi: x / Non Sq Epi: x / Bacteria: x        RADIOLOGY & ADDITIONAL TESTS:      A/P:  MAURILIO HENSON is a 73y Female s/p mechanical fall, with acute R comminuted acetabular fracture. Patient being managed non-operatively.       Plan:	  - TTWB RLE   - Cards- moderate risk; pulm- high risk   - Iron panel for anemia work up suggestive of chronic anemia   - supportive care  - pain management  - incentive spirometer   - DVT/ GI PPX  - repeat studies as needed  - replace electrolytes  - c/w COPD medications   - f/u COVID pcr results   - continue home meds     Disposition: DC to [facility]    Above plan discussed with Attending Surgeon Dr. Chester  , patient, patient family, and Primary team      TAP (Trauma, Acute care, Pediatrics) Spectra [phone]   GENERAL SURGERY PROGRESS NOTE     MAURILIO HENSON  [address] [place] [address] day :4d    POD:  Procedure:   Surgical Attending: Sujey Chester  Overnight events:    NAD   HGB stable 8.5 from 8.1   iron panel sent for anemia work up  vitals wnl   TTWB RLE   covid sent for SNF  IS 1750   Mg repleted with 2g   tolerating diet  having bowel movements   pain controlled     T(F): 98.9 (12-04-23 @ 01:13), Max: 99.1 (12-03-23 @ 21:00)  HR: 97 (12-04-23 @ 01:13) (73 - 103)  BP: 138/74 (12-04-23 @ 01:13) (138/74 - 183/61)  ABP: --  ABP(mean): --  RR: 18 (12-04-23 @ 01:13) (18 - 18)  SpO2: 95% (12-04-23 @ 01:13) (95% - 96%)    IN'S / OUT's:    12-02-23 @ 07:01  -  12-03-23 @ 07:00  --------------------------------------------------------  IN:  Total IN: 0 mL    OUT:    Voided (mL): 600 mL  Total OUT: 600 mL    Total NET: -600 mL      12-03-23 @ 07:01  -  12-04-23 @ 03:58  --------------------------------------------------------  IN:  Total IN: 0 mL    OUT:    Voided (mL): 550 mL  Total OUT: 550 mL    Total NET: -550 mL          PHYSICAL EXAM:  GENERAL: NAD, well-appearing  CHEST/LUNG: Clear to auscultation bilaterally on 2 L nc   HEART: Regular rate and rhythm  ABDOMEN: Soft, Nontender, Nondistended;   EXTREMITIES:  No clubbing, cyanosis, or edema      LABS  Labs:  CAPILLARY BLOOD GLUCOSE      POCT Blood Glucose.: 103 mg/dL (03 Dec 2023 21:04)  POCT Blood Glucose.: 98 mg/dL (03 Dec 2023 17:21)  POCT Blood Glucose.: 118 mg/dL (03 Dec 2023 11:36)  POCT Blood Glucose.: 87 mg/dL (03 Dec 2023 08:22)                          8.5    8.45  )-----------( 300      ( 04 Dec 2023 00:55 )             25.4       Auto Neutrophil %: 79.9 % (12-04-23 @ 00:55)  Auto Immature Granulocyte %: 0.2 % (12-04-23 @ 00:55)    12-04    136  |  103  |  9<L>  ----------------------------<  90  4.6   |  23  |  0.5<L>      Calcium: 8.4 mg/dL (12-04-23 @ 00:55)      LFTs:             4.8  | 0.6  | 24       ------------------[98      ( 04 Dec 2023 00:55 )  2.6  | x    | 13          Lipase:x      Amylase:x             Coags:            Urinalysis Basic - ( 04 Dec 2023 00:55 )    Color: x / Appearance: x / SG: x / pH: x  Gluc: 90 mg/dL / Ketone: x  / Bili: x / Urobili: x   Blood: x / Protein: x / Nitrite: x   Leuk Esterase: x / RBC: x / WBC x   Sq Epi: x / Non Sq Epi: x / Bacteria: x        RADIOLOGY & ADDITIONAL TESTS:      A/P:  MAURILIO HENSON is a 73y Female s/p mechanical fall, with acute R comminuted acetabular fracture. Patient being managed non-operatively.       Plan:	  - TTWB RLE   - Cards- moderate risk; pulm- high risk   - Iron panel for anemia work up suggestive of chronic anemia   - supportive care  - pain management  - incentive spirometer   - DVT/ GI PPX  - repeat studies as needed  - replace electrolytes  - c/w COPD medications   - f/u COVID pcr results   - continue home meds     Disposition: DC to [facility]    Above plan discussed with Attending Surgeon Dr. Chester  , patient, patient family, and Primary team      TAP (Trauma, Acute care, Pediatrics) Spectra [phone]

## 2023-12-04 NOTE — DISCHARGE NOTE PROVIDER - NSFOLLOWUPCLINICS_GEN_ALL_ED_FT
[facility]  Trauma Surgery  [address] [place] [address]  Phone: [phone]  Fax:   Follow Up Time: 2 weeks     [facility]  Trauma Surgery  [address] [place] [address]  Phone: [phone]  Fax:   Follow Up Time: 2 weeks

## 2023-12-04 NOTE — DISCHARGE NOTE PROVIDER - PROVIDER TOKENS
PROVIDER:[TOKEN:[[id number]],FOLLOWUP:[2 weeks]] PROVIDER:[TOKEN:[[id number]],FOLLOWUP:[2 weeks]]

## 2023-12-07 DIAGNOSIS — Z87.891 PERSONAL HISTORY OF NICOTINE DEPENDENCE: ICD-10-CM

## 2023-12-07 DIAGNOSIS — D53.9 NUTRITIONAL ANEMIA, UNSPECIFIED: ICD-10-CM

## 2023-12-07 DIAGNOSIS — J43.9 EMPHYSEMA, UNSPECIFIED: ICD-10-CM
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
## 2023-12-07 DIAGNOSIS — Z86.718 PERSONAL HISTORY OF OTHER VENOUS THROMBOSIS AND EMBOLISM: ICD-10-CM

## 2023-12-07 DIAGNOSIS — D62 ACUTE POSTHEMORRHAGIC ANEMIA: ICD-10-CM

## 2023-12-07 DIAGNOSIS — D72.829 ELEVATED WHITE BLOOD CELL COUNT, UNSPECIFIED: ICD-10-CM

## 2023-12-07 DIAGNOSIS — I48.91 UNSPECIFIED ATRIAL FIBRILLATION: ICD-10-CM

## 2023-12-07 DIAGNOSIS — Z11.52 ENCOUNTER FOR SCREENING FOR COVID-19: ICD-10-CM

## 2023-12-07 DIAGNOSIS — I10 ESSENTIAL (PRIMARY) HYPERTENSION: ICD-10-CM

## 2023-12-07 DIAGNOSIS — E03.9 HYPOTHYROIDISM, UNSPECIFIED: ICD-10-CM

## 2023-12-07 DIAGNOSIS — S36.892A CONTUSION OF OTHER INTRA-ABDOMINAL ORGANS, INITIAL ENCOUNTER: ICD-10-CM
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
## 2023-12-07 DIAGNOSIS — J44.9 CHRONIC OBSTRUCTIVE PULMONARY DISEASE, UNSPECIFIED: ICD-10-CM
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
## 2023-12-07 DIAGNOSIS — Z79.01 LONG TERM (CURRENT) USE OF ANTICOAGULANTS: ICD-10-CM

## 2023-12-07 DIAGNOSIS — D68.69 OTHER THROMBOPHILIA: ICD-10-CM

## 2023-12-07 DIAGNOSIS — I95.9 HYPOTENSION, UNSPECIFIED: ICD-10-CM

## 2023-12-07 DIAGNOSIS — R91.1 SOLITARY PULMONARY NODULE: ICD-10-CM

## 2023-12-07 DIAGNOSIS — E78.5 HYPERLIPIDEMIA, UNSPECIFIED: ICD-10-CM

## 2023-12-07 DIAGNOSIS — K59.00 CONSTIPATION, UNSPECIFIED: ICD-10-CM

## 2023-12-07 DIAGNOSIS — S32.401A UNSPECIFIED FRACTURE OF RIGHT ACETABULUM, INITIAL ENCOUNTER FOR CLOSED FRACTURE: ICD-10-CM

## 2023-12-14 ENCOUNTER — APPOINTMENT (OUTPATIENT)
Dept: ORTHOPEDIC SURGERY | Facility: ASSISTED LIVING FACILITY | Age: 73
End: 2023-12-14
Payer: MEDICARE

## 2023-12-14 PROCEDURE — 99308 SBSQ NF CARE LOW MDM 20: CPT

## 2024-02-06 ENCOUNTER — APPOINTMENT (OUTPATIENT)
Dept: ORTHOPEDIC SURGERY | Facility: CLINIC | Age: 74
End: 2024-02-06
Payer: MEDICARE

## 2024-02-06 ENCOUNTER — RESULT CHARGE (OUTPATIENT)
Age: 74
End: 2024-02-06

## 2024-02-06 PROCEDURE — 73502 X-RAY EXAM HIP UNI 2-3 VIEWS: CPT

## 2024-02-06 PROCEDURE — 99214 OFFICE O/P EST MOD 30 MIN: CPT

## 2024-02-06 NOTE — ASSESSMENT
[FreeTextEntry1] : 74-year-old woman 2 months nonoperative management of a right anterior, medial wall acetabular fracture which looks like it is further subsided probably from prolonged weightbearing.  My feeling is this patient ultimately is going to benefit from conversion to a total hip arthroplasty.  I reviewed this case with Dr. Tammie Nash who agrees.  Because of her COPD this may take some time to get medical optimization.  In the interim we will allow the patient to continue weight-bear as tolerated in physical therapy.  Will arrange for a CAT scan of her right acetabulum/pelvis with fine cuts to evaluate fracture healing in a month.  Will see her back subsequent to the CAT scan to discuss treatment options.  Any problems she is welcome back sooner.  All questions were answered to her satisfaction.

## 2024-02-06 NOTE — IMAGING
[de-identified] : 74-year-old woman walks into my office ostensibly touchdown weightbearing although it appears that she is at least 50% partial weightbearing through her right lower extremity.  She walks with a mildly antalgic gait.  She is accompanied by her .  Physical examination: Thin almost cachectic late middle-aged woman looking older than stated age. Right hip: No tenderness palpation along the bony prominences.  She has pain with gentle rotation hip joint.  No lymph nodes in the groin.  Able to flex her up to 90 degrees much of this is from her lumbar spine.  Radiographs: Right hip (AP, lateral): Anterior, medial wall acetabular fracture which has further displaced such that the femoral neck is now articulating on the lateral shelf of the acetabulum.  Pelvis (AP, Judet): Anterior column with the medial wall fracture.  Unclear the degree of healing.  Secondary congruence is noted but there is as noted above articulation of the posterior lateral wall of the acetabulum and the femoral neck.

## 2024-02-06 NOTE — HISTORY OF PRESENT ILLNESS
[de-identified] : 74-year-old woman with severe COPD referred to my office for management of a right anterior column medial wall acetabular fracture sustained as a result of a ground-level fall November 30, 2023.  Managed conservatively as she presents to my office ostensibly with instructions to remain touchdown weightbearing.  She has some groin pain particular when she tries to rotate her hip.  Does not routinely take medication for her pain.  Denies any fevers or sensory complaints.  Does not report any weakness.  Does not have pain at rest.

## 2024-03-06 ENCOUNTER — RESULT REVIEW (OUTPATIENT)
Age: 74
End: 2024-03-06

## 2024-03-06 ENCOUNTER — OUTPATIENT (OUTPATIENT)
Dept: OUTPATIENT SERVICES | Facility: HOSPITAL | Age: 74
LOS: 1 days | End: 2024-03-06
Payer: MEDICARE

## 2024-03-06 DIAGNOSIS — M25.559 PAIN IN UNSPECIFIED HIP: ICD-10-CM

## 2024-03-06 DIAGNOSIS — R10.2 PELVIC AND PERINEAL PAIN: ICD-10-CM

## 2024-03-06 DIAGNOSIS — Z00.8 ENCOUNTER FOR OTHER GENERAL EXAMINATION: ICD-10-CM

## 2024-03-06 DIAGNOSIS — Z95.828 PRESENCE OF OTHER VASCULAR IMPLANTS AND GRAFTS: Chronic | ICD-10-CM

## 2024-03-06 PROCEDURE — 72192 CT PELVIS W/O DYE: CPT | Mod: 26,MH

## 2024-03-06 PROCEDURE — 72192 CT PELVIS W/O DYE: CPT

## 2024-03-07 DIAGNOSIS — R10.2 PELVIC AND PERINEAL PAIN: ICD-10-CM

## 2024-03-07 DIAGNOSIS — M25.559 PAIN IN UNSPECIFIED HIP: ICD-10-CM

## 2024-03-15 ENCOUNTER — APPOINTMENT (OUTPATIENT)
Dept: ORTHOPEDIC SURGERY | Facility: CLINIC | Age: 74
End: 2024-03-15
Payer: MEDICARE

## 2024-03-15 PROCEDURE — 99213 OFFICE O/P EST LOW 20 MIN: CPT

## 2024-03-15 RX ORDER — CELECOXIB 200 MG/1
200 CAPSULE ORAL TWICE DAILY
Qty: 60 | Refills: 3 | Status: ACTIVE | COMMUNITY
Start: 2024-03-15 | End: 1900-01-01

## 2024-03-15 NOTE — ASSESSMENT
[FreeTextEntry1] : 74-year-old woman with a anterior column posterior hemitransverse left acetabular fracture.  The fracture is largely healed but the patient now has displaced her joint surface posteriorly and superiorly.  This is can result in some impingement of her femoral neck.  To that and I would allow her to continue with physical therapy we would hold off on passive stretching of her hip joint.  We talked about ultimately providing her with a hip replacement which would likely improve her function.  I have reviewed the case with Dr. Tammie Nash who agrees.  We are going to give her a prescription for Celebrex to help address her discomfort for now and we will see her back in 3 months time to revisit a discussion of hip replacement surgery.  Any problems am happy to see her back sooner.  All questions answered to her and her  satisfaction.

## 2024-03-15 NOTE — HISTORY OF PRESENT ILLNESS
[de-identified] : Every 4-year-old woman returns for interval follow-up status post ground-level fall resulting in a anterior column acetabular fracture November 30, 2023.  The patient contends with COPD which precluded her surgery at the time of her accident.  She is now back at home receiving outpatient therapy.  When I last saw her I was concerned with the radiographs which suggested that she had significantly displaced her fracture.  She obtained a CT at my instruction.  She is continue with physical therapy.  Occasionally has some groin pain radiating into her thigh consistent with hip arthritis.  She has questions about transitioning to a cane and what activities can be done to improve her function.  She wonders what medication she can take for the discomfort.  She reports that she had a DEXA scan prior to her fall which suggested that she had no osteoporosis.  I do not have a copy of the results.

## 2024-03-15 NOTE — IMAGING
[de-identified] : Pleasant middle-aged woman walks into my office utilizing her walker.  Physical examination: Right hip: Clear leg length discrepancy when I lay her supine on the bed and flex her knees.  There are some restrictions towards range of motion of her hip.  I am able to flex her to about 70 degrees without undue discomfort.  Internal and external rotation is around 30 degrees in either plane.  There are no lymph nodes in her inguinal crease.  No tenderness of the trochanteric bursa.  Radiographs: Deferred  CT of the patient's pelvis reviewed.  This demonstrated displaced anterior column acetabular fracture with displacement of the Merlene of her hip joint superiorly and posteriorly.  The anterior column is partially healed.  The posterior column of the transverse component of her fracture has healed.

## 2024-04-19 RX ORDER — FLUTICASONE PROPIONATE AND SALMETEROL 500; 50 UG/1; UG/1
500-50 POWDER RESPIRATORY (INHALATION) TWICE DAILY
Qty: 3 | Refills: 3 | Status: ACTIVE | COMMUNITY
Start: 1900-01-01 | End: 1900-01-01

## 2024-04-22 RX ORDER — TIOTROPIUM BROMIDE 18 UG/1
18 CAPSULE ORAL; RESPIRATORY (INHALATION)
Qty: 90 | Refills: 3 | Status: ACTIVE | COMMUNITY
Start: 2023-01-23 | End: 1900-01-01

## 2024-06-04 ENCOUNTER — APPOINTMENT (OUTPATIENT)
Dept: PULMONOLOGY | Facility: CLINIC | Age: 74
End: 2024-06-04
Payer: MEDICARE

## 2024-06-04 VITALS
DIASTOLIC BLOOD PRESSURE: 85 MMHG | WEIGHT: 100 LBS | OXYGEN SATURATION: 87 % | HEART RATE: 85 BPM | BODY MASS INDEX: 15.7 KG/M2 | HEIGHT: 67 IN | SYSTOLIC BLOOD PRESSURE: 160 MMHG

## 2024-06-04 DIAGNOSIS — J44.9 CHRONIC OBSTRUCTIVE PULMONARY DISEASE, UNSPECIFIED: ICD-10-CM

## 2024-06-04 DIAGNOSIS — Z01.811 ENCOUNTER FOR PREPROCEDURAL RESPIRATORY EXAMINATION: ICD-10-CM

## 2024-06-04 DIAGNOSIS — J47.9 BRONCHIECTASIS, UNCOMPLICATED: ICD-10-CM

## 2024-06-04 PROCEDURE — G2211 COMPLEX E/M VISIT ADD ON: CPT

## 2024-06-04 PROCEDURE — 99214 OFFICE O/P EST MOD 30 MIN: CPT

## 2024-06-04 RX ORDER — ALBUTEROL SULFATE 90 UG/1
108 (90 BASE) INHALANT RESPIRATORY (INHALATION)
Qty: 1 | Refills: 5 | Status: ACTIVE | COMMUNITY
Start: 2024-06-04 | End: 1900-01-01

## 2024-06-04 NOTE — HISTORY OF PRESENT ILLNESS
[TextBox_4] : - Summary : Mrs. Barnes is an elderly patient presenting for pre-operative evaluation before undergoing hip surgery. She reports a history of COPD and recently fractured her right hip after a fall. - Chief Complaint (CC) : Pre-operative evaluation for hip surgery - History of Present Illness (HPI) : Chief Complaint: Pre-operative evaluation for hip surgery : Fractured right hip after tripping on a step stool and falling Duration: Fractures have healed, but hip still not fully recovered Severity: Requires use of a cane or walker for ambulation Shortening of the right leg, requiring a shoe lift Severity and Impact: Significant impact on mobility Comparative Analysis: No previous hip issues reported Patient's Medical Regimen: Taking Spiriva and Advair inhalers for COPD Contextual Factors: None reported Recent Medical Interventions: Physical therapy after the injury - Past Medical History : COPD - Review of Systems : Respiratory system Chronic cough, no wheezing or crackles noted on examination - Medications : Spiriva and Advair inhalers for COPD

## 2024-06-04 NOTE — REASON FOR VISIT
[Follow-Up] : a follow-up visit [COPD] : COPD [Bronchiectasis] : bronchiectasis [Pre-op Risk Stratification] : pre-op risk stratification

## 2024-06-04 NOTE — ASSESSMENT
[FreeTextEntry1] : - Summary : Pt. l is an elderly patient with COPD and a recent right hip fracture, presenting for pre-operative evaluation before undergoing hip surgery. Her COPD appears to be well-controlled on her current inhaler regimen, but she is  at moderate risk for respiratory complications during surgery. - Problems : - COPD  - Right hip fracture (healed)  - Differential Diagnosis : - COPD exacerbation during surgery is a potential concern, but her current respiratory status appears stable.  Plan: - Summary : The plan is to proceed with hip surgery while optimizing respiratory management. An additional rescue inhaler will be prescribed, and the surgeon will be informed of the patient's COPD status and potential risks. - Plan : - Add albuterol rescue inhaler for use as needed and before activity  - Continue Spiriva and Advair inhalers as prescribed  - Inform the surgeon (Dr. Luna) of the patient's COPD status and potential respiratory risks during surgery  - Proceed with hip surgery as planned, with appropriate respiratory monitoring and management during the procedure  I believe further improvement in her respiratory status is to be had at this point.

## 2024-06-07 ENCOUNTER — APPOINTMENT (OUTPATIENT)
Dept: ORTHOPEDIC SURGERY | Facility: CLINIC | Age: 74
End: 2024-06-07
Payer: MEDICARE

## 2024-06-07 DIAGNOSIS — S32.481D: ICD-10-CM

## 2024-06-07 PROCEDURE — 99213 OFFICE O/P EST LOW 20 MIN: CPT

## 2024-06-07 RX ORDER — CELECOXIB 200 MG/1
200 CAPSULE ORAL TWICE DAILY
Qty: 60 | Refills: 2 | Status: ACTIVE | COMMUNITY
Start: 2024-06-07 | End: 1900-01-01

## 2024-06-07 NOTE — IMAGING
[de-identified] : Level a late middle-aged woman sits comfortably in my office.  She is accompanied by her .  Physical examination: Right hip: Little in way of discomfort with internal/external rotation of her hip joint.  I am able to flex her up to 100 degrees without undue discomfort.  There is clearly a leg length discrepancy.  Calf soft no cords.  Radiographs: Deferred

## 2024-06-07 NOTE — HISTORY OF PRESENT ILLNESS
[de-identified] : 74-year-old woman with severe COPD returns for interval follow-up of the anterior, medial wall acetabular fracture sustained November 30, 2023.  The patient walks with her walker.  Her pain is uncomfortable but not on unduly uncomfortable.  I last saw her about 2 months ago.  At that time we talked about the leg length discrepancy and her discomfort.  We talked briefly about hip replacement surgery.  Advised her to go see her pulmonologist who she saw last week.  He felt that she could proceed with surgery.  She returns today to talk about surgery and treatment.  She remains on Celebrex alone for discomfort.

## 2024-06-07 NOTE — ASSESSMENT
[FreeTextEntry1] : 74-year-old woman nonoperative management right acetabular fracture which healed in a impacted position.  She is moderately symptomatic but very much does not like the leg length discrepancy.  She has gone to her pulmonologist to think that she could tolerate surgery.  I have reviewed this case with Dr. Tammie Nash.  She is interested in total hip replacement.  She however would prefer to wait until after summer.  I think this is not unreasonable.  I will have her follow-up in either August or early September and then we can plan her surgery subsequently.  Right before she comes in organ to repeat a CAT scan of her right hip/pelvis for planning of the surgery.  All questions were answered to her satisfaction she can continue with the Celebrex until that time.

## 2024-06-26 ENCOUNTER — OUTPATIENT (OUTPATIENT)
Dept: OUTPATIENT SERVICES | Facility: HOSPITAL | Age: 74
LOS: 1 days | End: 2024-06-26
Payer: MEDICARE

## 2024-06-26 ENCOUNTER — APPOINTMENT (OUTPATIENT)
Dept: CV DIAGNOSITCS | Facility: HOSPITAL | Age: 74
End: 2024-06-26

## 2024-06-26 ENCOUNTER — RESULT REVIEW (OUTPATIENT)
Age: 74
End: 2024-06-26

## 2024-06-26 DIAGNOSIS — Z95.828 PRESENCE OF OTHER VASCULAR IMPLANTS AND GRAFTS: Chronic | ICD-10-CM

## 2024-06-26 DIAGNOSIS — R06.02 SHORTNESS OF BREATH: ICD-10-CM

## 2024-06-26 PROCEDURE — 93306 TTE W/DOPPLER COMPLETE: CPT | Mod: 26

## 2024-06-26 PROCEDURE — 93306 TTE W/DOPPLER COMPLETE: CPT

## 2024-06-27 DIAGNOSIS — R06.02 SHORTNESS OF BREATH: ICD-10-CM

## 2024-09-20 ENCOUNTER — APPOINTMENT (OUTPATIENT)
Dept: ORTHOPEDIC SURGERY | Facility: CLINIC | Age: 74
End: 2024-09-20

## 2024-09-24 ENCOUNTER — APPOINTMENT (OUTPATIENT)
Dept: ORTHOPEDIC SURGERY | Facility: CLINIC | Age: 74
End: 2024-09-24
Payer: MEDICARE

## 2024-09-24 DIAGNOSIS — S32.481D: ICD-10-CM

## 2024-09-24 PROCEDURE — 99213 OFFICE O/P EST LOW 20 MIN: CPT

## 2024-09-24 NOTE — ASSESSMENT
[FreeTextEntry1] : 74-year-old woman impacted right acetabular fracture which seems to have attain secondary congruence.  Patient relatively asymptomatic.  Offered custom shoes to accommodate leg length discrepancy.  Takes this under advisement.  Will contact the office if she reconsiders.  I have her follow-up in my office in 8 months time for interval check with radiographs of her right acetabulum (AP pelvis, Judet views).  If she develops worsening symptoms of pain or problems she is invited to come back sooner.  All questions answered to her satisfaction and she agrees with the plan.

## 2024-09-24 NOTE — IMAGING
[de-identified] : Pleasant late middle-aged woman walks into my office with really no evidence of antalgia utilizing her walker for balance.  She is able across the room without any assist device and no significant lurch with her left in her shoe.  Physical examination: Right hip: No tenderness palpation about her inguinal crease.  No pain with gentle rotation of hip.  I am able to flex her up to 90 degrees.  Calves are soft without cords.  There is about an inch leg length discrepancy clinically.

## 2024-09-24 NOTE — HISTORY OF PRESENT ILLNESS
[de-identified] : 74-year-old woman returns for interval follow-up and nonoperatively managed right impacted acetabular fracture sustained as a result of a ground-level fall November 2023.  When last seen in June of this year she was having some discomfort and we discussed hip replacement.  She returns today noting her pain is largely resolved.  She does not require regular use of NSAIDs.  Walks about her house without assistive device.  Utilizes a walker in the community.  Does note a leg length discrepancy.  Has treated this with her own left.  She is accompanied by her .

## 2024-09-26 ENCOUNTER — RX RENEWAL (OUTPATIENT)
Age: 74
End: 2024-09-26

## 2025-05-19 NOTE — ED ADULT TRIAGE NOTE - IDEAL BODY WEIGHT(KG)
Afshan,    Thank you for trusting [facility] with your care. Your decision to come to us means a lot and we are honored to be part of your healthcare journey. We value your trust and hope your experience with us was positive and met your expectations.    We're always looking for ways to improve, and your feedback is incredibly important to us. You will receive a text or email soon asking you how your visit went. for If you could take a moment to share your thoughts, it would mean the world to us. Your input helps us better serve you and others in the community.     Thank you again for choosing us. We're grateful for the opportunity to care for you and your loved ones. We hope to see you again - though we always wish you health and wellness!    Warm regards,    [facility]    Trace Cook PA-C, Faye (Registration), and Amisha (Medical Assistant)      Wound is cleaned and dressed in clinic today    Augmentin is prescribed for prophylactic antibiotic treatment of the due to the injury having been caused by a dog bite.  Take the antibiotics until completed, do not stop unless otherwise directed by a healthcare provider.  Recommend daily yogurt or other probiotics while on antibiotics.  Recommend over the counter ibuprofen (Motrin or Advil), and/or acetaminophen (Tylenol) for pain relief, as needed.  Keep wound covered for 12-24 hours. Then change dressings 1-2 times per day. After 3 days, or when the wound is dry, can leave the wound uncovered as long as you are able to keep the wound clean.  Once the wound is dry, can place a thin covering of Vaseline (petroleum jelly), or a topical antibiotic ointment, over the wound.   This helps keep the wound edges moist to help the healing process.   This also helps keep the wound from itching during healing.  No submerging the wound in water while the wound is open, especially in sources of unclean water - such as dirty dish water,  59